# Patient Record
Sex: MALE | Race: WHITE | NOT HISPANIC OR LATINO | Employment: FULL TIME | URBAN - METROPOLITAN AREA
[De-identification: names, ages, dates, MRNs, and addresses within clinical notes are randomized per-mention and may not be internally consistent; named-entity substitution may affect disease eponyms.]

---

## 2017-02-22 DIAGNOSIS — E11.9 TYPE 2 DIABETES MELLITUS WITHOUT COMPLICATION, WITHOUT LONG-TERM CURRENT USE OF INSULIN (H): ICD-10-CM

## 2017-02-22 NOTE — TELEPHONE ENCOUNTER
Metformin         Last Written Prescription Date: 10/26/16  Last Fill Quantity: 60, # refills: 0  Last Office Visit with [facility], [facility] or [facility] prescribing provider:  11/08/16        BP Readings from Last 3 Encounters:   11/08/16 104/80   08/25/15 120/78   06/11/14 120/72     Lab Results   Component Value Date    MICROL 15 11/08/2016     No results found for: MICROALBUMIN  Creatinine   Date Value Ref Range Status   11/08/2016 0.81 0.66 - 1.25 mg/dL Final   ]  GFR Estimate   Date Value Ref Range Status   11/08/2016 >90  Non  GFR Calc   >60 mL/min/1.7m2 Final   08/25/2015 >90  Non  GFR Calc   >60 mL/min/1.7m2 Final   01/22/2014 >90 >60 mL/min/1.7m2 Final     GFR Estimate If Black   Date Value Ref Range Status   11/08/2016 >90   GFR Calc   >60 mL/min/1.7m2 Final   08/25/2015 >90   GFR Calc   >60 mL/min/1.7m2 Final   01/22/2014 >90 >60 mL/min/1.7m2 Final     Lab Results   Component Value Date    CHOL 143 11/08/2016     Lab Results   Component Value Date    HDL 43 11/08/2016     Lab Results   Component Value Date    LDL 86 11/08/2016     Lab Results   Component Value Date    TRIG 69 11/08/2016     Lab Results   Component Value Date    CHOLHDLRATIO 3.2 08/25/2015     Lab Results   Component Value Date    AST 15 11/08/2016     Lab Results   Component Value Date    ALT 29 11/08/2016     Lab Results   Component Value Date    A1C 6.9 11/08/2016    A1C 6.0 08/25/2015    A1C 7.6 01/22/2014    A1C 6.7 09/23/2013    A1C 6.9 11/16/2012     Potassium   Date Value Ref Range Status   11/08/2016 3.9 3.4 - 5.3 mmol/L Final       Labs showing if normal/abnormal  Lab Results   Component Value Date    UCRR 163 11/08/2016    MICROL 15 11/08/2016     Lab Results   Component Value Date    CHOL 143 11/08/2016    TRIG 69 11/08/2016    HDL 43 11/08/2016    LDL 86 11/08/2016    VLDL 19 08/25/2015    CHOLHDLRATIO 3.2 08/25/2015     Lab Results   Component Value Date    A1C 6.9 (H)  11/08/2016    A1C 6.0 08/25/2015    A1C 7.6 (H) 01/22/2014

## 2017-02-24 ENCOUNTER — TELEPHONE (OUTPATIENT)
Dept: INTERNAL MEDICINE | Facility: CLINIC | Age: 47
End: 2017-02-24

## 2017-02-24 DIAGNOSIS — E11.9 TYPE 2 DIABETES MELLITUS WITHOUT COMPLICATION, WITHOUT LONG-TERM CURRENT USE OF INSULIN (H): ICD-10-CM

## 2017-02-24 RX ORDER — GLIMEPIRIDE 1 MG/1
1 TABLET ORAL DAILY
Qty: 90 TABLET | Refills: 0 | Status: SHIPPED | OUTPATIENT
Start: 2017-02-24 | End: 2017-06-26

## 2017-02-24 RX ORDER — METFORMIN HCL 500 MG
500 TABLET, EXTENDED RELEASE 24 HR ORAL 2 TIMES DAILY
Qty: 60 TABLET | Refills: 2 | Status: SHIPPED | OUTPATIENT
Start: 2017-02-24 | End: 2017-06-26

## 2017-02-24 NOTE — TELEPHONE ENCOUNTER
[facility] sent refill request for Glimepiride 1mg, 1 tab daily. This was discontinued from med list on 11/8/16, stating stopped by pt.     Attempted to contact pt to find out if he is still taking this. Left voice message to call back.

## 2017-02-24 NOTE — TELEPHONE ENCOUNTER
Pt calls back. He states he has been taking this. He thinks this was discontinued in error.     Lab Results   Component Value Date    A1C 6.9 11/08/2016    A1C 6.0 08/25/2015    A1C 7.6 01/22/2014    A1C 6.7 09/23/2013    A1C 6.9 11/16/2012     Creatinine   Date Value Ref Range Status   11/08/2016 0.81 0.66 - 1.25 mg/dL Final   ]

## 2017-04-26 ENCOUNTER — OFFICE VISIT (OUTPATIENT)
Dept: CARDIOLOGY | Facility: CLINIC | Age: 47
End: 2017-04-26
Attending: INTERNAL MEDICINE
Payer: COMMERCIAL

## 2017-04-26 VITALS
WEIGHT: 283.7 LBS | SYSTOLIC BLOOD PRESSURE: 136 MMHG | DIASTOLIC BLOOD PRESSURE: 88 MMHG | BODY MASS INDEX: 44.53 KG/M2 | HEIGHT: 67 IN

## 2017-04-26 DIAGNOSIS — R06.02 SOB (SHORTNESS OF BREATH): ICD-10-CM

## 2017-04-26 DIAGNOSIS — R06.02 SOB (SHORTNESS OF BREATH): Primary | ICD-10-CM

## 2017-04-26 DIAGNOSIS — R07.89 CHEST PRESSURE: ICD-10-CM

## 2017-04-26 DIAGNOSIS — I10 ESSENTIAL HYPERTENSION, BENIGN: ICD-10-CM

## 2017-04-26 DIAGNOSIS — E78.5 HYPERLIPIDEMIA LDL GOAL <100: ICD-10-CM

## 2017-04-26 DIAGNOSIS — E78.2 MIXED HYPERLIPIDEMIA: ICD-10-CM

## 2017-04-26 LAB — NT-PROBNP SERPL-MCNC: 16 PG/ML (ref 0–125)

## 2017-04-26 PROCEDURE — 83880 ASSAY OF NATRIURETIC PEPTIDE: CPT | Performed by: INTERNAL MEDICINE

## 2017-04-26 PROCEDURE — 99204 OFFICE O/P NEW MOD 45 MIN: CPT | Mod: 25 | Performed by: INTERNAL MEDICINE

## 2017-04-26 PROCEDURE — 36415 COLL VENOUS BLD VENIPUNCTURE: CPT | Performed by: INTERNAL MEDICINE

## 2017-04-26 PROCEDURE — 93000 ELECTROCARDIOGRAM COMPLETE: CPT | Performed by: INTERNAL MEDICINE

## 2017-04-26 RX ORDER — IBUPROFEN 200 MG
600 TABLET ORAL 2 TIMES DAILY
Status: ON HOLD | COMMUNITY
End: 2018-07-18

## 2017-04-26 NOTE — LETTER
"4/26/2017    Edna Younger MD  [facility]  [address] [place] [address]    RE: Montez Valerio       Dear Colleague,    I had the pleasure of seeing Montez Valerio in the [facility].    I had the pleasure of seeing Montez Valerio in cardiology consultation on your request for shortness of breath and occasional chest tightness.  Montez is a pleasant 47-year-old male with history of diabetes and hypertension.  His last A1c was 6.9 in 11/2016.  Last November, he was at work, and typically at work, he takes a break and does some lifting weights at the same site.  After one of those activities, he noticed some shortness of breath.  He thought it would be transient, but since then the shortness of breath has persisted.  He can walk on a regular pace but is now aware of some shortness of breath.  It is also noticeable when he walks a flight of stairs.  Along with that, he started developing a cough with minimal expectoration.  It is usually in the morning.  It has been bothersome to a point that he called your office and was given a prednisone Dosepak that did not help his cough.  Along with the shortness of breath and cough, he has some intermittent chest pressure that is not always related to exertion.  On days when his chest is more \"irritable\" he feels more short of breath and has cough.      Because of these symptoms, he initially underwent a stress echocardiogram in 11/2016, which showed no clear evidence of ischemia.  But because there was slight chest discomfort with exercise, he was referred for a stress nuclear study.  This was done on 12/02/2016 with exercise.  Exercise capacity was average to above average with 10.9 METs, and the perfusion was normal with ejection fraction greater than 70%.  Again, he had some chest tightness with exercise.        He claims that the tightness is not always related to exertion when he has it now.      His blood pressure " is reasonably well controlled.  He takes 6-9 tablets of ibuprofen every day for his neck pain.  He has cervical spinal stenosis since a young age, and it is quite bothersome.  He is on simvastatin, but it takes it about 3-4 days a week.  His last LDL was 86.      PHYSICAL EXAMINATION:   VITAL SIGNS:  Blood pressure 136/88, pulse 80 per minute and regular.   CARDIAC:  Regular S1, S2 with no murmurs.   CHEST:  Clear to auscultation.     Outpatient Encounter Prescriptions as of 4/26/2017   Medication Sig Dispense Refill     ibuprofen (ADVIL/MOTRIN) 200 MG tablet Take 600 mg by mouth 2 times daily       cholecalciferol (VITAMIN  -D) 1000 UNITS capsule Take 1 capsule by mouth daily       [DISCONTINUED] metFORMIN (GLUCOPHAGE-XR) 500 MG 24 hr tablet Take 1 tablet (500 mg) by mouth 2 times daily 60 tablet 2     [DISCONTINUED] glimepiride (AMARYL) 1 MG tablet Take 1 tablet (1 mg) by mouth daily 90 tablet 0     blood glucose monitoring (ONETOUCH VERIO) meter device kit Use to test blood sugars ONE times daily or as directed. 1 kit 0     blood glucose monitoring (ONE TOUCH DELICA) lancets Use to test blood sugars ONE times daily or as directed. 1 Box 3     blood glucose monitoring (ONE TOUCH VERIO IQ) test strip Use to test blood sugars ONE times daily or as directed. 100 each 3     blood glucose monitoring (ACCU-CHEK HOWARD PLUS) test strip by In Vitro route daily 100 each 1     simvastatin (ZOCOR) 20 MG tablet Take 1 tablet (20 mg) by mouth daily at bedtime. (Patient taking differently: Take 20 mg by mouth Take one tablet 2-3 days week) 90 tablet 3     [DISCONTINUED] losartan-hydrochlorothiazide (HYZAAR) 50-12.5 MG per tablet Take 1 tablet by mouth daily 90 tablet 1     [DISCONTINUED] methylPREDNISolone (MEDROL DOSEPAK) 4 MG tablet Follow package instructions 21 tablet 0     [DISCONTINUED] ORDER FOR DME Equipment being ordered: Please dispense 1 set of crutches 1 Device 0     No facility-administered encounter medications on  file as of 4/26/2017.       IMPRESSION:   1.  Episode of shortness of breath with exertion since November.   2.  Atypical chest pressure, not always related to exertion.   3.  Normal perfusion as well as normal stress echo but with exertional chest pressure on stress testing.   4.  Obstructive sleep apnea on CPAP therapy.   5.  Cough since November, questionable etiology, failed prednisone.   6.  Diabetes.   7.  Hyperlipidemia, on simvastatin.   8.  Degenerative cervical arthritis on chronic ibuprofen therapy.        PLAN:     1.  Shortness of breath and chest pressure.  At this time, I would suggest we do an echocardiogram for us to assess diastology as well as PA pressures.  Given the longstanding sleep apnea, he may be at risk for pulmonary hypertension.  I have suggested that we obtain the RV pressures by TR jet and if required inject saline to enhance the TR jet.  RV size and function will also be assessed.  If echocardiogram is entirely normal without any structural abnormalities or pulmonary hypertension, we may consider doing further evaluation for obstructive CAD.  The options would be either do a noninvasive CT coronary angiography versus invasive angiography.  I would also suggest that he see a pulmonologist to rule out pulmonary causes of shortness of breath and cough.  I have asked him to be in touch with you to get a referral.      Depending on the echocardiogram results, we will make further decision.  I will have my nurse call him and then decide on the further tests.  If he is willing, I would have a low threshold for doing a CT coronary angiography.  However, he understands the risk of dye exposure, radiation exposure as well as the technical difficulty given his habitus, but it would be noninvasive.      The patient is willing to proceed with the echocardiogram.  We will call him with the results and decide further plan.  I have also asked him to call you to get a pulmonary consult.      Thank you  for allowing us to participate in the care of this nice patient.  He was also asked to see a pain specialist and get off the chronic use of ibuprofen, as it can have long-term kidney, gastric side effects as well as raise blood pressure and cause edema.     Sincerely,    Jeancarlos Jones MD     [facility]

## 2017-04-26 NOTE — MR AVS SNAPSHOT
After Visit Summary   4/26/2017    Montez Valerio    MRN: [id number]           Patient Information     Date Of Birth          1970        Visit Information        Provider Department      4/26/2017 11:15 AM Jeancarlos Jones MD [facility] [place]        Today's Diagnoses     SOB (shortness of breath)    -  1    Essential hypertension, benign        Mixed hyperlipidemia        Hyperlipidemia LDL goal <100        Chest pressure           Follow-ups after your visit        Future tests that were ordered for you today     Open Future Orders        Priority Expected Expires Ordered    Echocardiogram Routine 5/3/2017 4/26/2018 4/26/2017    N terminal pro BNP outpatient Routine 4/26/2017 4/21/2018 4/26/2017            Who to contact     If you have questions or need follow up information about today's clinic visit or your schedule please contact [facility] directly at [phone].  Normal or non-critical lab and imaging results will be communicated to you by MyChart, letter or phone within 4 business days after the clinic has received the results. If you do not hear from us within 7 days, please contact the clinic through OpenSpanhart or phone. If you have a critical or abnormal lab result, we will notify you by phone as soon as possible.  Submit refill requests through m2fx or call your pharmacy and they will forward the refill request to us. Please allow 3 business days for your refill to be completed.          Additional Information About Your Visit        MyChart Information     m2fx gives you secure access to your electronic health record. If you see a primary care provider, you can also send messages to your care team and make appointments. If you have questions, please call your primary care clinic.  If you do not have a primary care provider, please call [phone] and they will assist you.        Care EveryWhere  "ID     This is your Care EveryWhere ID. This could be used by other organizations to access your [facility] medical records  [id number]        Your Vitals Were     Height BMI (Body Mass Index)                1.702 m (5' 7\") 44.43 kg/m2           Blood Pressure from Last 3 Encounters:   04/26/17 136/88   11/08/16 104/80   08/25/15 120/78    Weight from Last 3 Encounters:   04/26/17 128.7 kg (283 lb 11.2 oz)   11/08/16 127.7 kg (281 lb 8 oz)   08/25/15 123.4 kg (272 lb)              We Performed the Following     EKG 12-lead complete w/read - Clinics (performed today)          Today's Medication Changes          These changes are accurate as of: 4/26/17 11:48 AM.  If you have any questions, ask your nurse or doctor.               These medicines have changed or have updated prescriptions.        Dose/Directions    simvastatin 20 MG tablet   Commonly known as:  ZOCOR   This may have changed:    - when to take this  - additional instructions   Used for:  Hyperlipidemia LDL goal <100        Dose:  20 mg   Take 1 tablet (20 mg) by mouth daily at bedtime.   Quantity:  90 tablet   Refills:  3                Primary Care Provider Office Phone # Fax #    Krishnakumari BRANDON Younger [phone]       [place] [address] [place] [address]        Thank you!     Thank you for choosing [facility] [place]  for your care. Our goal is always to provide you with excellent care. Hearing back from our patients is one way we can continue to improve our services. Please take a few minutes to complete the written survey that you may receive in the mail after your visit with us. Thank you!             Your Updated Medication List - Protect others around you: Learn how to safely use, store and throw away your medicines at www.disposemymeds.org.          This list is accurate as of: 4/26/17 11:48 AM.  Always use your most recent med list.                   Brand Name " Dispense Instructions for use    blood glucose monitoring lancets     1 Box    Use to test blood sugars ONE times daily or as directed.       blood glucose monitoring meter device kit     1 kit    Use to test blood sugars ONE times daily or as directed.       * blood glucose monitoring test strip    ACCU-CHEK HOWARD PLUS    100 each    by In Vitro route daily       * blood glucose monitoring test strip    ONE TOUCH VERIO IQ    100 each    Use to test blood sugars ONE times daily or as directed.       cholecalciferol 1000 UNITS capsule    vitamin  -D     Take 1 capsule by mouth daily       glimepiride 1 MG tablet    AMARYL    90 tablet    Take 1 tablet (1 mg) by mouth daily       ibuprofen 200 MG tablet    ADVIL/MOTRIN     Take 600 mg by mouth 2 times daily       losartan-hydrochlorothiazide 50-12.5 MG per tablet    HYZAAR    90 tablet    Take 1 tablet by mouth daily       metFORMIN 500 MG 24 hr tablet    GLUCOPHAGE-XR    60 tablet    Take 1 tablet (500 mg) by mouth 2 times daily       simvastatin 20 MG tablet    ZOCOR    90 tablet    Take 1 tablet (20 mg) by mouth daily at bedtime.       * Notice:  This list has 2 medication(s) that are the same as other medications prescribed for you. Read the directions carefully, and ask your doctor or other care provider to review them with you.

## 2017-04-26 NOTE — PROGRESS NOTES
HPI and Plan:   See dictation  [id number]    Orders Placed This Encounter   Procedures     N terminal pro BNP outpatient     EKG 12-lead complete w/read - Clinics (performed today)     Echocardiogram       Orders Placed This Encounter   Medications     ibuprofen (ADVIL/MOTRIN) 200 MG tablet     Sig: Take 600 mg by mouth 2 times daily     cholecalciferol (VITAMIN  -D) 1000 UNITS capsule     Sig: Take 1 capsule by mouth daily       Medications Discontinued During This Encounter   Medication Reason     ORDER FOR DME Therapy completed     methylPREDNISolone (MEDROL DOSEPAK) 4 MG tablet Therapy completed         Encounter Diagnoses   Name Primary?     Essential hypertension, benign      Mixed hyperlipidemia      Hyperlipidemia LDL goal <100      SOB (shortness of breath) Yes     Chest pressure        CURRENT MEDICATIONS:  Current Outpatient Prescriptions   Medication Sig Dispense Refill     ibuprofen (ADVIL/MOTRIN) 200 MG tablet Take 600 mg by mouth 2 times daily       cholecalciferol (VITAMIN  -D) 1000 UNITS capsule Take 1 capsule by mouth daily       metFORMIN (GLUCOPHAGE-XR) 500 MG 24 hr tablet Take 1 tablet (500 mg) by mouth 2 times daily 60 tablet 2     glimepiride (AMARYL) 1 MG tablet Take 1 tablet (1 mg) by mouth daily 90 tablet 0     blood glucose monitoring (ONETOUCH VERIO) meter device kit Use to test blood sugars ONE times daily or as directed. 1 kit 0     blood glucose monitoring (ONE TOUCH DELICA) lancets Use to test blood sugars ONE times daily or as directed. 1 Box 3     blood glucose monitoring (ONE TOUCH VERIO IQ) test strip Use to test blood sugars ONE times daily or as directed. 100 each 3     blood glucose monitoring (ACCU-CHEK HOWARD PLUS) test strip by In Vitro route daily 100 each 1     losartan-hydrochlorothiazide (HYZAAR) 50-12.5 MG per tablet Take 1 tablet by mouth daily 90 tablet 1     simvastatin (ZOCOR) 20 MG tablet Take 1 tablet (20 mg) by mouth daily at bedtime. (Patient taking differently: Take  20 mg by mouth Take one tablet 2-3 days week) 90 tablet 3       ALLERGIES     Allergies   Allergen Reactions     No Known Drug Allergies        PAST MEDICAL HISTORY:  Past Medical History:   Diagnosis Date     Arthritis     Neck     Diabetes mellitus (H)     type 2     Dyspnea on exertion      Gastro-oesophageal reflux disease      Hypertension      Other chronic pain     neck pain     PONV (postoperative nausea and vomiting)      Sleep apnea     CPAP     SOB (shortness of breath) 4/26/2017       PAST SURGICAL HISTORY:  Past Surgical History:   Procedure Laterality Date     ARTHROSCOPY KNEE  2/12/2014    Procedure: ARTHROSCOPY KNEE;  Right Knee Arthroscopy, Removal of Loose Body, Partial Lateral Meniscectomy  ;  Surgeon: Amado Rondon MD;  Location: [facility] OR     AXILLARY SURGERY      Mass removed left     HC KNEE SCOPE, DIAGNOSTIC  1984    Arthroscopy, Knee right     LAPAROSCOPIC CHOLECYSTECTOMY  12/21/2012    Procedure: LAPAROSCOPIC CHOLECYSTECTOMY;  LAPAROSCOPIC CHOLECYSTECTOMY;  Surgeon: Alton Riggs MD;  Location: [facility] OR     wisdom teeth[         FAMILY HISTORY:  Family History   Problem Relation Age of Onset     Lipids Mother      CEREBROVASCULAR DISEASE Mother      Lipids Father      C.A.D. Maternal Grandfather      MI     C.A.D. Paternal Grandfather      MI     CANCER Paternal Grandmother      Pancreatic     Alcohol/Drug Sister      Psychotic Disorder Sister        SOCIAL HISTORY:  Social History     Social History     Marital status: Single     Spouse name: N/A     Number of children: N/A     Years of education: N/A     Social History Main Topics     Smoking status: Current Some Day Smoker     Types: Cigars     Smokeless tobacco: Never Used      Comment: occ. cigar     Alcohol use Yes      Comment: 4-6 drinks, 1-2 nights week, beer/liquor     Drug use: No     Sexual activity: No     Other Topics Concern     None     Social History Narrative       Review of Systems:  Skin:  Negative       Eyes:   "Positive for glasses    ENT:  Negative      Respiratory:  Positive for cough;dyspnea on exertion     Cardiovascular:  cyanosis;Negative;edema;syncope or near-syncope Positive for;palpitations;lightheadedness;dizziness;fatigue;lower extremity symptoms chest pressure  Gastroenterology: Negative      Genitourinary:  Negative      Musculoskeletal:  Positive for joint pain;neck pain    Neurologic:  Positive for numbness or tingling of feet    Psychiatric:  Negative      Heme/Lymph/Imm:  Negative      Endocrine:  Positive for diabetes      Physical Exam:  Vitals: /88 (BP Location: Left arm, Cuff Size: Adult Large)  Ht 1.702 m (5' 7\")  Wt 128.7 kg (283 lb 11.2 oz)  BMI 44.43 kg/m2    Constitutional:  cooperative overweight      Skin:  warm and dry to the touch        Head:  no masses or lesions        Eyes:  pupils equal and round        ENT:  dentition good        Neck:  carotid pulses are full and equal bilaterally        Chest:  clear to auscultation          Cardiac: regular rhythm;normal S1 and S2   S4 no presence of murmur            Abdomen:    obese      Vascular: not assessed this visit                                        Extremities and Back:      trace;bilateral LE edema          Neurological:                 CC  Edna Younger MD  [facility]  [address] [place] [address]              "

## 2017-04-27 NOTE — PROGRESS NOTES
"HISTORY OF PRESENT ILLNESS:  I had the pleasure of seeing Montez Valerio in cardiology consultation on your request for shortness of breath and occasional chest tightness.  Montez is a pleasant 47-year-old male with history of diabetes and hypertension.  His last A1c was 6.9 in 11/2016.  Last November, he was at work, and typically at work, he takes a break and does some lifting weights at the same site.  After one of those activities, he noticed some shortness of breath.  He thought it would be transient, but since then the shortness of breath has persisted.  He can walk on a regular pace but is now aware of some shortness of breath.  It is also noticeable when he walks a flight of stairs.  Along with that, he started developing a cough with minimal expectoration.  It is usually in the morning.  It has been bothersome to a point that he called your office and was given a prednisone Dosepak that did not help his cough.  Along with the shortness of breath and cough, he has some intermittent chest pressure that is not always related to exertion.  On days when his chest is more \"irritable\" he feels more short of breath and has cough.      Because of these symptoms, he initially underwent a stress echocardiogram in 11/2016, which showed no clear evidence of ischemia.  But because there was slight chest discomfort with exercise, he was referred for a stress nuclear study.  This was done on 12/02/2016 with exercise.  Exercise capacity was average to above average with 10.9 METs, and the perfusion was normal with ejection fraction greater than 70%.  Again, he had some chest tightness with exercise.        He claims that the tightness is not always related to exertion when he has it now.      His blood pressure is reasonably well controlled.  He takes 6-9 tablets of ibuprofen every day for his neck pain.  He has cervical spinal stenosis since a young age, and it is quite bothersome.  He is on simvastatin, but it takes it about " 3-4 days a week.  His last LDL was 86.      PHYSICAL EXAMINATION:   VITAL SIGNS:  Blood pressure 136/88, pulse 80 per minute and regular.   CARDIAC:  Regular S1, S2 with no murmurs.   CHEST:  Clear to auscultation.      IMPRESSION:   1.  Episode of shortness of breath with exertion since November.   2.  Atypical chest pressure, not always related to exertion.   3.  Normal perfusion as well as normal stress echo but with exertional chest pressure on stress testing.   4.  Obstructive sleep apnea on CPAP therapy.   5.  Cough since November, questionable etiology, failed prednisone.   6.  Diabetes.   7.  Hyperlipidemia, on simvastatin.   8.  Degenerative cervical arthritis on chronic ibuprofen therapy.        PLAN:     1.  Shortness of breath and chest pressure.  At this time, I would suggest we do an echocardiogram for us to assess diastology as well as PA pressures.  Given the longstanding sleep apnea, he may be at risk for pulmonary hypertension.  I have suggested that we obtain the RV pressures by TR jet and if required inject saline to enhance the TR jet.  RV size and function will also be assessed.  If echocardiogram is entirely normal without any structural abnormalities or pulmonary hypertension, we may consider doing further evaluation for obstructive CAD.  The options would be either do a noninvasive CT coronary angiography versus invasive angiography.  I would also suggest that he see a pulmonologist to rule out pulmonary causes of shortness of breath and cough.  I have asked him to be in touch with you to get a referral.      Depending on the echocardiogram results, we will make further decision.  I will have my nurse call him and then decide on the further tests.  If he is willing, I would have a low threshold for doing a CT coronary angiography.  However, he understands the risk of dye exposure, radiation exposure as well as the technical difficulty given his habitus, but it would be noninvasive.      The  patient is willing to proceed with the echocardiogram.  We will call him with the results and decide further plan.  I have also asked him to call you to get a pulmonary consult.      Thank you for allowing us to participate in the care of this nice patient.  He was also asked to see a pain specialist and get off the chronic use of ibuprofen, as it can have long-term kidney, gastric side effects as well as raise blood pressure and cause edema.      cc:   Eliseo Younger MD    [facility]    [address]    [place]  [address]         AFSANEH CALHOUN MD             D: 2017 11:56   T: 2017 04:28   MT: ROSE      Name:     KATIE GUADALUPE   MRN:      [id number]        Account:      [id number]   :      1970           Service Date: 2017      Document: [id number]

## 2017-05-01 ENCOUNTER — HOSPITAL ENCOUNTER (OUTPATIENT)
Dept: CARDIOLOGY | Facility: CLINIC | Age: 47
Discharge: HOME | End: 2017-05-01
Attending: INTERNAL MEDICINE | Admitting: INTERNAL MEDICINE
Payer: COMMERCIAL

## 2017-05-01 DIAGNOSIS — R07.89 CHEST PRESSURE: ICD-10-CM

## 2017-05-01 DIAGNOSIS — R06.02 SOB (SHORTNESS OF BREATH): ICD-10-CM

## 2017-05-01 PROCEDURE — 25500064 ZZH RX 255 OP 636: Performed by: INTERNAL MEDICINE

## 2017-05-01 PROCEDURE — 40000264 ECHO COMPLETE WITH LUMASON

## 2017-05-01 PROCEDURE — 93306 TTE W/DOPPLER COMPLETE: CPT | Mod: 26 | Performed by: INTERNAL MEDICINE

## 2017-05-01 RX ADMIN — SULFUR HEXAFLUORIDE 5 ML: KIT at 09:00

## 2017-05-09 ENCOUNTER — TELEPHONE (OUTPATIENT)
Dept: CARDIOLOGY | Facility: CLINIC | Age: 47
End: 2017-05-09

## 2017-05-09 ENCOUNTER — TELEPHONE (OUTPATIENT)
Dept: INTERNAL MEDICINE | Facility: CLINIC | Age: 47
End: 2017-05-09

## 2017-05-09 DIAGNOSIS — R06.02 SOB (SHORTNESS OF BREATH): Primary | ICD-10-CM

## 2017-05-09 NOTE — TELEPHONE ENCOUNTER
Please review -   Left Ventricle  The left ventricle is normal in size. There is mild concentric left  ventricular hypertrophy. Left ventricular systolic function is normal. The  visual ejection fraction is estimated at 60-65%. E by E prime ratio is between  8 and 15, which is indeterminate for assessment of left ventricular filling  pressures. No regional wall motion abnormalities noted...  Please review entire echo results

## 2017-05-09 NOTE — TELEPHONE ENCOUNTER
Panel Management Review      Patient has the following on his problem list:     Diabetes    ASA: Failed    Last A1C  Lab Results   Component Value Date    A1C 6.9 11/08/2016    A1C 6.0 08/25/2015    A1C 7.6 01/22/2014    A1C 6.7 09/23/2013    A1C 6.9 11/16/2012     A1C tested: Passed    Last LDL:    Lab Results   Component Value Date    CHOL 143 11/08/2016     Lab Results   Component Value Date    HDL 43 11/08/2016     Lab Results   Component Value Date    LDL 86 11/08/2016     Lab Results   Component Value Date    TRIG 69 11/08/2016     Lab Results   Component Value Date    CHOLHDLRATIO 3.2 08/25/2015     Lab Results   Component Value Date    NHDL 100 11/08/2016       Is the patient on a Statin? YES             Is the patient on Aspirin? NO    Medications     HMG CoA Reductase Inhibitors    simvastatin (ZOCOR) 20 MG tablet          Last three blood pressure readings:  BP Readings from Last 3 Encounters:   04/26/17 136/88   11/08/16 104/80   08/25/15 120/78       Date of last diabetes office visit: 11/2016     Tobacco History:     History   Smoking Status     Current Some Day Smoker     Types: Cigars   Smokeless Tobacco     Never Used     Comment: occ. cigar           IVD   ASA: MONITOR    Last LDL:    Lab Results   Component Value Date    CHOL 143 11/08/2016     Lab Results   Component Value Date    HDL 43 11/08/2016     Lab Results   Component Value Date    LDL 86 11/08/2016     Lab Results   Component Value Date    TRIG 69 11/08/2016        Lab Results   Component Value Date    CHOLHDLRATIO 3.2 08/25/2015        Is the patient on a Statin? YES   Is the patient on Aspirin? NO                  Medications     HMG CoA Reductase Inhibitors    simvastatin (ZOCOR) 20 MG tablet          Last three blood pressure readings:  BP Readings from Last 3 Encounters:   04/26/17 136/88   11/08/16 104/80   08/25/15 120/78        Tobacco History:     History   Smoking Status     Current Some Day Smoker     Types: Cigars    Smokeless Tobacco     Never Used     Comment: occ. cigar       Hypertension   Last three blood pressure readings:  BP Readings from Last 3 Encounters:   04/26/17 136/88   11/08/16 104/80   08/25/15 120/78     Blood pressure: Passed    HTN Guidelines:  Age 18-59 BP range:  Less than 140/90  Age 60-85 with Diabetes:  Less than 140/90  Age 60-85 without Diabetes:  less than 150/90      Composite cancer screening  Chart review shows that this patient is due/due soon for the following None  Summary:    Patient is due/failing the following:   A1C and FOLLOW UP    Action needed:   Patient needs office visit for DM/HTN/Lipid.    Type of outreach:    Sent Lily [employer] message.    Questions for provider review:    None                                                                                                                                    Jazmine Oquendo CMA       Chart routed to no one .

## 2017-05-11 DIAGNOSIS — I42.9 CARDIOMYOPATHY, UNSPECIFIED (H): Primary | ICD-10-CM

## 2017-05-11 DIAGNOSIS — R06.02 SOB (SHORTNESS OF BREATH): ICD-10-CM

## 2017-05-11 NOTE — TELEPHONE ENCOUNTER
Called Pt informed him DR Jones would like him to come in to review echo results. Pt agreed and transferred him to scheduling. NIRU Quevedo RN

## 2017-05-17 NOTE — TELEPHONE ENCOUNTER
Called Pt per DR Jones and informed him of echo results: Left ventricular systolic function is normal. The visual ejection fraction is estimated at 60-65%.  Right ventricular systolic pressure could not be approximated due to inadequate tricuspid regurgitation. The right ventricular systolic function is normal. The study was technically difficult. There is no comparison study available. Per DR Jones echo ok. Discussed with Pt if he still has SOB. Per Pt SOB and cough are slowly improving. Asked Pt if he wanted to do further testing and DR Jones recommended CTa. Pt said at present he would wait and see PMD regarding lingering SOB with cough and discuss Pulmonary F/U. T Sae GRUBBS  _

## 2017-06-26 ENCOUNTER — TELEPHONE (OUTPATIENT)
Dept: INTERNAL MEDICINE | Facility: CLINIC | Age: 47
End: 2017-06-26

## 2017-06-26 DIAGNOSIS — I10 ESSENTIAL HYPERTENSION WITH GOAL BLOOD PRESSURE LESS THAN 140/90: ICD-10-CM

## 2017-06-26 DIAGNOSIS — E11.9 TYPE 2 DIABETES MELLITUS WITHOUT COMPLICATION, WITHOUT LONG-TERM CURRENT USE OF INSULIN (H): ICD-10-CM

## 2017-06-26 RX ORDER — METFORMIN HCL 500 MG
TABLET, EXTENDED RELEASE 24 HR ORAL
Qty: 30 TABLET | Refills: 0 | Status: SHIPPED | OUTPATIENT
Start: 2017-06-26 | End: 2017-08-25

## 2017-06-26 RX ORDER — LOSARTAN POTASSIUM AND HYDROCHLOROTHIAZIDE 12.5; 5 MG/1; MG/1
TABLET ORAL
Qty: 15 TABLET | Refills: 0 | Status: SHIPPED | OUTPATIENT
Start: 2017-06-26 | End: 2017-08-25

## 2017-06-26 RX ORDER — GLIMEPIRIDE 1 MG/1
TABLET ORAL
Qty: 15 TABLET | Refills: 0 | Status: SHIPPED | OUTPATIENT
Start: 2017-06-26 | End: 2017-08-25

## 2017-06-26 NOTE — TELEPHONE ENCOUNTER
Metformin         Last Written Prescription Date: 2/24/17  Last Fill Quantity: 60, # refills: 2  Last Office Visit with [facility] or [facility] prescribing provider:  11/8/16        BP Readings from Last 3 Encounters:   04/26/17 136/88   11/08/16 104/80   08/25/15 120/78     Lab Results   Component Value Date    MICROL 15 11/08/2016     Lab Results   Component Value Date    UMALCR 9.08 11/08/2016     Creatinine   Date Value Ref Range Status   11/08/2016 0.81 0.66 - 1.25 mg/dL Final   ]  GFR Estimate   Date Value Ref Range Status   11/08/2016 >90  Non  GFR Calc   >60 mL/min/1.7m2 Final   08/25/2015 >90  Non  GFR Calc   >60 mL/min/1.7m2 Final   01/22/2014 >90 >60 mL/min/1.7m2 Final     GFR Estimate If Black   Date Value Ref Range Status   11/08/2016 >90   GFR Calc   >60 mL/min/1.7m2 Final   08/25/2015 >90   GFR Calc   >60 mL/min/1.7m2 Final   01/22/2014 >90 >60 mL/min/1.7m2 Final     Lab Results   Component Value Date    CHOL 143 11/08/2016     Lab Results   Component Value Date    HDL 43 11/08/2016     Lab Results   Component Value Date    LDL 86 11/08/2016     Lab Results   Component Value Date    TRIG 69 11/08/2016     Lab Results   Component Value Date    CHOLHDLRATIO 3.2 08/25/2015     Lab Results   Component Value Date    AST 15 11/08/2016     Lab Results   Component Value Date    ALT 29 11/08/2016     Lab Results   Component Value Date    A1C 6.9 11/08/2016    A1C 6.0 08/25/2015    A1C 7.6 01/22/2014    A1C 6.7 09/23/2013    A1C 6.9 11/16/2012     Potassium   Date Value Ref Range Status   11/08/2016 3.9 3.4 - 5.3 mmol/L Final       Labs showing if normal/abnormal  Lab Results   Component Value Date    UCRR 163 11/08/2016    MICROL 15 11/08/2016     Lab Results   Component Value Date    CHOL 143 11/08/2016    TRIG 69 11/08/2016    HDL 43 11/08/2016    LDL 86 11/08/2016    VLDL 19 08/25/2015    CHOLHDLRATIO 3.2 08/25/2015     Lab Results   Component  Value Date    A1C 6.9 (H) 11/08/2016    A1C 6.0 08/25/2015    A1C 7.6 (H) 01/22/2014       Glimepiride         Last Written Prescription Date: 2/24/17  Last Fill Quantity: 90, # refills: 0  Last Office Visit with [facility] or [facility] prescribing provider:  11/8/16        BP Readings from Last 3 Encounters:   04/26/17 136/88   11/08/16 104/80   08/25/15 120/78     Lab Results   Component Value Date    MICROL 15 11/08/2016     Lab Results   Component Value Date    UMALCR 9.08 11/08/2016     Creatinine   Date Value Ref Range Status   11/08/2016 0.81 0.66 - 1.25 mg/dL Final   ]  GFR Estimate   Date Value Ref Range Status   11/08/2016 >90  Non  GFR Calc   >60 mL/min/1.7m2 Final   08/25/2015 >90  Non  GFR Calc   >60 mL/min/1.7m2 Final   01/22/2014 >90 >60 mL/min/1.7m2 Final     GFR Estimate If Black   Date Value Ref Range Status   11/08/2016 >90   GFR Calc   >60 mL/min/1.7m2 Final   08/25/2015 >90   GFR Calc   >60 mL/min/1.7m2 Final   01/22/2014 >90 >60 mL/min/1.7m2 Final     Lab Results   Component Value Date    CHOL 143 11/08/2016     Lab Results   Component Value Date    HDL 43 11/08/2016     Lab Results   Component Value Date    LDL 86 11/08/2016     Lab Results   Component Value Date    TRIG 69 11/08/2016     Lab Results   Component Value Date    CHOLHDLRATIO 3.2 08/25/2015     Lab Results   Component Value Date    AST 15 11/08/2016     Lab Results   Component Value Date    ALT 29 11/08/2016     Lab Results   Component Value Date    A1C 6.9 11/08/2016    A1C 6.0 08/25/2015    A1C 7.6 01/22/2014    A1C 6.7 09/23/2013    A1C 6.9 11/16/2012     Potassium   Date Value Ref Range Status   11/08/2016 3.9 3.4 - 5.3 mmol/L Final       Labs showing if normal/abnormal  Lab Results   Component Value Date    UCRR 163 11/08/2016    MICROL 15 11/08/2016     Lab Results   Component Value Date    CHOL 143 11/08/2016    TRIG 69 11/08/2016    HDL 43 11/08/2016    LDL 86  11/08/2016    VLDL 19 08/25/2015    CHOLHDLRATIO 3.2 08/25/2015     Lab Results   Component Value Date    A1C 6.9 (H) 11/08/2016    A1C 6.0 08/25/2015    A1C 7.6 (H) 01/22/2014     Losartan      Last Written Prescription Date: 11/8/16  Last Fill Quantity: 90, # refills: 1  Last Office Visit with [facility], [facility] or [facility] prescribing provider: 11/8/16       Potassium   Date Value Ref Range Status   11/08/2016 3.9 3.4 - 5.3 mmol/L Final     Creatinine   Date Value Ref Range Status   11/08/2016 0.81 0.66 - 1.25 mg/dL Final     BP Readings from Last 3 Encounters:   04/26/17 136/88   11/08/16 104/80   08/25/15 120/78

## 2017-06-27 ENCOUNTER — DOCUMENTATION ONLY (OUTPATIENT)
Dept: LAB | Facility: CLINIC | Age: 47
End: 2017-06-27

## 2017-06-27 ENCOUNTER — MYC MEDICAL ADVICE (OUTPATIENT)
Dept: INTERNAL MEDICINE | Facility: CLINIC | Age: 47
End: 2017-06-27

## 2017-06-27 NOTE — PROGRESS NOTES
Please place or confirm orders for upcoming lab appointment on 6/28/2017 Thank You.     Satisfactory

## 2017-06-27 NOTE — TELEPHONE ENCOUNTER
Pt calls back, relay due for labs and appt. Scheduled lab appt for 06/28 and MD for 07/10.    Pended A1C. Please add to labs as needed. Did ask pt to fast in the event fasting labs needed.    Please advise, thanks.

## 2017-06-28 DIAGNOSIS — E11.9 TYPE 2 DIABETES MELLITUS WITHOUT COMPLICATION, WITHOUT LONG-TERM CURRENT USE OF INSULIN (H): ICD-10-CM

## 2017-06-28 LAB — HBA1C MFR BLD: 7.2 % (ref 4.3–6)

## 2017-06-28 PROCEDURE — 80061 LIPID PANEL: CPT | Performed by: INTERNAL MEDICINE

## 2017-06-28 PROCEDURE — 80053 COMPREHEN METABOLIC PANEL: CPT | Performed by: INTERNAL MEDICINE

## 2017-06-28 PROCEDURE — 83036 HEMOGLOBIN GLYCOSYLATED A1C: CPT | Performed by: INTERNAL MEDICINE

## 2017-06-28 PROCEDURE — 36415 COLL VENOUS BLD VENIPUNCTURE: CPT | Performed by: INTERNAL MEDICINE

## 2017-06-28 PROCEDURE — 82043 UR ALBUMIN QUANTITATIVE: CPT | Performed by: INTERNAL MEDICINE

## 2017-06-29 LAB
ALBUMIN SERPL-MCNC: 4 G/DL (ref 3.4–5)
ALP SERPL-CCNC: 71 U/L (ref 40–150)
ALT SERPL W P-5'-P-CCNC: 35 U/L (ref 0–70)
ANION GAP SERPL CALCULATED.3IONS-SCNC: 6 MMOL/L (ref 3–14)
AST SERPL W P-5'-P-CCNC: 18 U/L (ref 0–45)
BILIRUB SERPL-MCNC: 0.6 MG/DL (ref 0.2–1.3)
BUN SERPL-MCNC: 11 MG/DL (ref 7–30)
CALCIUM SERPL-MCNC: 9.1 MG/DL (ref 8.5–10.1)
CHLORIDE SERPL-SCNC: 107 MMOL/L (ref 94–109)
CHOLEST SERPL-MCNC: 188 MG/DL
CO2 SERPL-SCNC: 26 MMOL/L (ref 20–32)
CREAT SERPL-MCNC: 0.76 MG/DL (ref 0.66–1.25)
CREAT UR-MCNC: 48 MG/DL
GFR SERPL CREATININE-BSD FRML MDRD: ABNORMAL ML/MIN/1.7M2
GLUCOSE SERPL-MCNC: 143 MG/DL (ref 70–99)
HDLC SERPL-MCNC: 50 MG/DL
LDLC SERPL CALC-MCNC: 118 MG/DL
MICROALBUMIN UR-MCNC: <5 MG/L
MICROALBUMIN/CREAT UR: NORMAL MG/G CR (ref 0–17)
NONHDLC SERPL-MCNC: 138 MG/DL
POTASSIUM SERPL-SCNC: 4.7 MMOL/L (ref 3.4–5.3)
PROT SERPL-MCNC: 7.3 G/DL (ref 6.8–8.8)
SODIUM SERPL-SCNC: 139 MMOL/L (ref 133–144)
TRIGL SERPL-MCNC: 102 MG/DL

## 2017-08-25 ENCOUNTER — OFFICE VISIT (OUTPATIENT)
Dept: INTERNAL MEDICINE | Facility: CLINIC | Age: 47
End: 2017-08-25
Payer: COMMERCIAL

## 2017-08-25 VITALS
SYSTOLIC BLOOD PRESSURE: 110 MMHG | DIASTOLIC BLOOD PRESSURE: 76 MMHG | WEIGHT: 286.5 LBS | HEART RATE: 86 BPM | BODY MASS INDEX: 44.97 KG/M2 | TEMPERATURE: 98.2 F | OXYGEN SATURATION: 95 % | HEIGHT: 67 IN

## 2017-08-25 DIAGNOSIS — Z23 NEED FOR TETANUS BOOSTER: Primary | ICD-10-CM

## 2017-08-25 DIAGNOSIS — I10 ESSENTIAL HYPERTENSION WITH GOAL BLOOD PRESSURE LESS THAN 140/90: ICD-10-CM

## 2017-08-25 DIAGNOSIS — E11.9 TYPE 2 DIABETES MELLITUS WITHOUT COMPLICATION, WITHOUT LONG-TERM CURRENT USE OF INSULIN (H): ICD-10-CM

## 2017-08-25 DIAGNOSIS — E78.5 HYPERLIPIDEMIA LDL GOAL <100: ICD-10-CM

## 2017-08-25 DIAGNOSIS — R06.02 SOB (SHORTNESS OF BREATH): ICD-10-CM

## 2017-08-25 DIAGNOSIS — E66.01 MORBID OBESITY, UNSPECIFIED OBESITY TYPE (H): ICD-10-CM

## 2017-08-25 PROCEDURE — 99214 OFFICE O/P EST MOD 30 MIN: CPT | Mod: 25 | Performed by: INTERNAL MEDICINE

## 2017-08-25 PROCEDURE — 90471 IMMUNIZATION ADMIN: CPT | Performed by: INTERNAL MEDICINE

## 2017-08-25 PROCEDURE — 90714 TD VACC NO PRESV 7 YRS+ IM: CPT | Performed by: INTERNAL MEDICINE

## 2017-08-25 RX ORDER — SIMVASTATIN 20 MG
20 TABLET ORAL DAILY
Qty: 90 TABLET | Refills: 3 | Status: SHIPPED | OUTPATIENT
Start: 2017-08-25 | End: 2017-12-01

## 2017-08-25 RX ORDER — METFORMIN HCL 500 MG
500 TABLET, EXTENDED RELEASE 24 HR ORAL 2 TIMES DAILY
Qty: 180 TABLET | Refills: 1 | Status: SHIPPED | OUTPATIENT
Start: 2017-08-25 | End: 2017-11-27

## 2017-08-25 RX ORDER — LOSARTAN POTASSIUM AND HYDROCHLOROTHIAZIDE 12.5; 5 MG/1; MG/1
1 TABLET ORAL DAILY
Qty: 90 TABLET | Refills: 1 | Status: SHIPPED | OUTPATIENT
Start: 2017-08-25 | End: 2017-11-27

## 2017-08-25 RX ORDER — ALBUTEROL SULFATE 90 UG/1
2 AEROSOL, METERED RESPIRATORY (INHALATION) EVERY 6 HOURS
Qty: 1 INHALER | Refills: 3 | Status: SHIPPED | OUTPATIENT
Start: 2017-08-25 | End: 2019-02-06

## 2017-08-25 RX ORDER — GLIMEPIRIDE 1 MG/1
1 TABLET ORAL DAILY
Qty: 90 TABLET | Refills: 1 | Status: SHIPPED | OUTPATIENT
Start: 2017-08-25 | End: 2017-11-27

## 2017-08-25 NOTE — NURSING NOTE
"Chief Complaint   Patient presents with     Hypertension     Diabetes     Lipids       Initial /76 (BP Location: Right arm, Patient Position: Chair, Cuff Size: Adult Large)  Pulse 86  Temp 98.2  F (36.8  C) (Oral)  Ht 5' 7\" (1.702 m)  Wt 286 lb 8 oz (130 kg)  SpO2 95%  BMI 44.87 kg/m2 Estimated body mass index is 44.87 kg/(m^2) as calculated from the following:    Height as of this encounter: 5' 7\" (1.702 m).    Weight as of this encounter: 286 lb 8 oz (130 kg).  Medication Reconciliation: complete    "

## 2017-08-25 NOTE — PROGRESS NOTES
SUBJECTIVE:   Montez Valerio is a 47 year old male who presents to clinic today for the following health issues:    Diabetes Follow-up      Patient is checking blood sugars: 3-4 times a week, averages 100-150     Diabetic concerns: None     Symptoms of hypoglycemia (low blood sugar): none     Paresthesias (numbness or burning in feet) or sores: Numbness in right toe     Date of last diabetic eye exam: 8/24/2017    Hyperlipidemia Follow-Up      Rate your low fat/cholesterol diet?: good, Taking simvastatin 20 mg only 2-3 x wk.     Taking statin?  Yes, no muscle aches from statin    Other lipid medications/supplements?:  none    Hypertension Follow-up      Outpatient blood pressures are being checked at home.  Results are Normal.    Low Salt Diet: low salt    Patient also complains of having shortness of breath and chest tightness and  this has been going on and off for several years and usually occurs in the fall and winter. Also has a dry cough. Patient recently had a nuclear stress test and also echocardiogram which were normal. Has quit smoking a few years ago, was smoking occasional cigar. No history of asthma. Not on any inhalers at this time.        Amount of exercise or physical activity: 4-5 days/week for an average of greater than 60 minutes    Problems taking medications regularly: No    Medication side effects: none  Diet: low salt, low fat/cholesterol and diabetic      Patient Active Problem List   Diagnosis     Esophageal reflux     iamdcLUMB/LUMBOSAC DISC DEGEN     Mixed hyperlipidemia     Type 2 diabetes mellitus without complication (H)     HYPERLIPIDEMIA LDL GOAL <100     Morbid obesity (H)     Essential hypertension, benign     SOB (shortness of breath)     Chest pressure     Past Surgical History:   Procedure Laterality Date     ARTHROSCOPY KNEE  2/12/2014    Procedure: ARTHROSCOPY KNEE;  Right Knee Arthroscopy, Removal of Loose Body, Partial Lateral Meniscectomy  ;  Surgeon: Amado Rondon MD;   Location: [facility] OR     AXILLARY SURGERY      Mass removed left     HC KNEE SCOPE, DIAGNOSTIC  1984    Arthroscopy, Knee right     LAPAROSCOPIC CHOLECYSTECTOMY  12/21/2012    Procedure: LAPAROSCOPIC CHOLECYSTECTOMY;  LAPAROSCOPIC CHOLECYSTECTOMY;  Surgeon: Alton Riggs MD;  Location: [facility] OR     wisdom teeth[         Social History   Substance Use Topics     Smoking status: Current Some Day Smoker     Types: Cigars     Smokeless tobacco: Never Used      Comment: occ. cigar     Alcohol use Yes      Comment: 4-6 drinks, 1-2 nights week, beer/liquor     Family History   Problem Relation Age of Onset     Lipids Mother      CEREBROVASCULAR DISEASE Mother      Lipids Father      C.A.D. Maternal Grandfather      MI     C.A.D. Paternal Grandfather      MI     CANCER Paternal Grandmother      Pancreatic     Alcohol/Drug Sister      Psychotic Disorder Sister      MENTAL ILLNESS Sister          Current Outpatient Prescriptions   Medication Sig Dispense Refill     metFORMIN (GLUCOPHAGE-XR) 500 MG 24 hr tablet Take 1 tablet (500 mg) by mouth 2 times daily 180 tablet 1     simvastatin (ZOCOR) 20 MG tablet Take 1 tablet (20 mg) by mouth daily at bedtime. 90 tablet 3     losartan-hydrochlorothiazide (HYZAAR) 50-12.5 MG per tablet Take 1 tablet by mouth daily 90 tablet 1     glimepiride (AMARYL) 1 MG tablet Take 1 tablet (1 mg) by mouth daily 90 tablet 1     albuterol (PROAIR HFA/PROVENTIL HFA/VENTOLIN HFA) 108 (90 BASE) MCG/ACT Inhaler Inhale 2 puffs into the lungs every 6 hours 1 Inhaler 3     ibuprofen (ADVIL/MOTRIN) 200 MG tablet Take 600 mg by mouth 2 times daily       cholecalciferol (VITAMIN  -D) 1000 UNITS capsule Take 1 capsule by mouth daily       blood glucose monitoring (ONETOUCH VERIO) meter device kit Use to test blood sugars ONE times daily or as directed. 1 kit 0     blood glucose monitoring (ONE TOUCH DELICA) lancets Use to test blood sugars ONE times daily or as directed. 1 Box 3     blood glucose monitoring (ONE  "TOUCH VERIO IQ) test strip Use to test blood sugars ONE times daily or as directed. 100 each 3     blood glucose monitoring (ACCU-CHEK HOWARD PLUS) test strip by In Vitro route daily 100 each 1     [DISCONTINUED] metFORMIN (GLUCOPHAGE-XR) 500 MG 24 hr tablet TAKE 1 TABLET TWICE A DAY 30 tablet 0     [DISCONTINUED] glimepiride (AMARYL) 1 MG tablet TAKE 1 TABLET DAILY 15 tablet 0     [DISCONTINUED] losartan-hydrochlorothiazide (HYZAAR) 50-12.5 MG per tablet TAKE 1 TABLET DAILY 15 tablet 0     [DISCONTINUED] simvastatin (ZOCOR) 20 MG tablet Take 1 tablet (20 mg) by mouth daily at bedtime. (Patient taking differently: Take 20 mg by mouth Take one tablet 2-3 days week) 90 tablet 3         Reviewed and updated as needed this visit by clinical staffAllergies       Reviewed and updated as needed this visit by Provider         ROS:  C: NEGATIVE for fever, chills, change in weight  E/M: NEGATIVE for ear, mouth and throat problems  RESP:sob, chest tightness, dry cough   CV: NEGATIVE for chest pain, palpitations or peripheral edema  NEURO: NEGATIVE for weakness, dizziness or paresthesias  ENDOCRINE: NEGATIVE for temperature intolerance, skin/hair changes  ROS otherwise negative    OBJECTIVE:                                                    /76 (BP Location: Right arm, Patient Position: Chair, Cuff Size: Adult Large)  Pulse 86  Temp 98.2  F (36.8  C) (Oral)  Ht 5' 7\" (1.702 m)  Wt 286 lb 8 oz (130 kg)  SpO2 95%  BMI 44.87 kg/m2  Body mass index is 44.87 kg/(m^2).   GENERAL: healthy, alert, well nourished, well hydrated, no distress  EYES: Eyes grossly normal to inspection, extraocular movements - intact, and PERRL  HENT: ear canals- normal; TMs- normal; Nose- normal; Mouth- no ulcers, no lesions  NECK: no tenderness, no adenopathy, no asymmetry, no masses, no stiffness; thyroid- normal to palpation  RESP: lungs clear to auscultation - no rales, no rhonchi, no wheezes  CV: regular rates and rhythm, normal S1 S2,  "   MS: extremities- no gross deformities noted, no edema. No calf tenderness  NEURO: strength and tone- normal, sensory exam- grossly normal, mentation- intact, speech- normal, reflexes- symmetric  Diabetic foot exam: normal DP and PT pulses, no trophic changes or ulcerative lesions, normal sensory exam and normal monofilament exam        ASSESSMENT/PLAN:                                                        1. Type 2 diabetes mellitus without complication, without long-term current use of insulin (H)  --A1c has slightly increased since before. Patient was advised to follow ADA diet regular exercise, and continue same medications and recheck A1c in 6 months  - Refilled metFORMIN (GLUCOPHAGE-XR) 500 MG 24 hr tablet; Take 1 tablet (500 mg) by mouth 2 times daily and glimepiride (AMARYL) 1 MG tablet; Take 1 tablet (1 mg) by mouth daily as directed.explained clearly about the medication,insructions and side effects.      2. Hyperlipidemia LDL goal <100  --. at this time, patient has been taking simvastatin 20 mg 2-3 times a week. Patient was advised to take simvastatin 20 mg daily, and a repeat lipids and 3 months  - simvastatin (ZOCOR) 20 MG tablet; Take 1 tablet (20 mg) by mouth daily at bedtime.  Dispense: 90 tablet; Refill: 3.explained clearly about the medication,insructions and side effects.      3. Essential hypertension with goal blood pressure less than 140/90  --Blood pressure well controlled  - losartan-hydrochlorothiazide (HYZAAR) 50-12.5 MG per tablet; Take 1 tablet by mouth daily  Dispense: 90 tablet; Refill: 1.explained clearly about the medication,insructions and side effects.      4. SOB (shortness of breath)  --Recent nuclear stress test and echocardiogram, normal, probably secondary to allergies/asthma. Patient was advised to try albuterol (PROAIR HFA/PROVENTIL HFA/VENTOLIN HFA) 108 (90 BASE) MCG/ACT Inhaler; Inhale 2 puffs into the lungs every 6 hours as directed. If no relief, then would get  CT chest, pulmonary function test and referral to pulmonologist. Patient understands      5. Need for tetanus booster  - C TD PRSERV FREE >=7 YRS ADS IM      6. Morbid obesity, unspecified obesity type (H)   -Discussed in detail about Diet,calorie intake,and importance of regular exercise, pt  will strive towards this      Edna Younger MD  [facility]

## 2017-08-25 NOTE — MR AVS SNAPSHOT
After Visit Summary   8/25/2017    Montez Valerio    MRN: [id number]           Patient Information     Date Of Birth          1970        Visit Information        Provider Department      8/25/2017 11:00 AM Edna Younger MD [facility]        Today's Diagnoses     Need for tetanus booster    -  1    Type 2 diabetes mellitus without complication, without long-term current use of insulin (H)        Hyperlipidemia LDL goal <100        Essential hypertension with goal blood pressure less than 140/90        SOB (shortness of breath)           Follow-ups after your visit        Your next 10 appointments already scheduled     Nov 17, 2017 10:00 AM CST   LAB with [facility]   [facility] ([facility])    [address]  [facility] [address] [phone]           Patient must bring picture ID. Patient should be prepared to give a urine specimen  Please do not eat 10-12 hours before your appointment if you are coming in fasting for labs on lipids, cholesterol, or glucose (sugar). Pregnant women should follow their Care Team instructions. Water with medications is okay. Do not drink coffee or other fluids. If you have concerns about taking  your medications, please ask at office or if scheduling via [employer], send a message by clicking on Secure Messaging, Message Your Care Team.              Who to contact     If you have questions or need follow up information about today's clinic visit or your schedule please contact [facility] directly at [phone].  Normal or non-critical lab and imaging results will be communicated to you by MyChart, letter or phone within 4 business days after the clinic has received the results. If you do not hear from us within 7 days, please contact the clinic through MyChart or phone. If you have a critical or abnormal lab result, we will notify you by phone as soon as possible.  Submit  "refill requests through [employer] or call your pharmacy and they will forward the refill request to us. Please allow 3 business days for your refill to be completed.          Additional Information About Your Visit        Razz[employer] Information     [employer] gives you secure access to your electronic health record. If you see a primary care provider, you can also send messages to your care team and make appointments. If you have questions, please call your primary care clinic.  If you do not have a primary care provider, please call [phone] and they will assist you.        Care EveryWhere ID     This is your Care EveryWhere ID. This could be used by other organizations to access your [facility] medical records  [id number]        Your Vitals Were     Pulse Temperature Height Pulse Oximetry BMI (Body Mass Index)       86 98.2  F (36.8  C) (Oral) 5' 7\" (1.702 m) 95% 44.87 kg/m2        Blood Pressure from Last 3 Encounters:   08/25/17 110/76   04/26/17 136/88   11/08/16 104/80    Weight from Last 3 Encounters:   08/25/17 286 lb 8 oz (130 kg)   04/26/17 283 lb 11.2 oz (128.7 kg)   11/08/16 281 lb 8 oz (127.7 kg)              We Performed the Following     C TD PRSERV FREE >=7 YRS ADS IM          Today's Medication Changes          These changes are accurate as of: 8/25/17 11:43 AM.  If you have any questions, ask your nurse or doctor.               Start taking these medicines.        Dose/Directions    albuterol 108 (90 BASE) MCG/ACT Inhaler   Commonly known as:  PROAIR HFA/PROVENTIL HFA/VENTOLIN HFA   Used for:  SOB (shortness of breath)   Started by:  Edna Younger MD        Dose:  2 puff   Inhale 2 puffs into the lungs every 6 hours   Quantity:  1 Inhaler   Refills:  3         These medicines have changed or have updated prescriptions.        Dose/Directions    glimepiride 1 MG tablet   Commonly known as:  AMARYL   This may have changed:  See the new instructions.   Used for:  Type 2 diabetes mellitus " without complication, without long-term current use of insulin (H)   Changed by:  Edna Younger MD        Dose:  1 mg   Take 1 tablet (1 mg) by mouth daily   Quantity:  90 tablet   Refills:  1       losartan-hydrochlorothiazide 50-12.5 MG per tablet   Commonly known as:  HYZAAR   This may have changed:  See the new instructions.   Used for:  Essential hypertension with goal blood pressure less than 140/90   Changed by:  Edna Younger MD        Dose:  1 tablet   Take 1 tablet by mouth daily   Quantity:  90 tablet   Refills:  1       metFORMIN 500 MG 24 hr tablet   Commonly known as:  GLUCOPHAGE-XR   This may have changed:  See the new instructions.   Used for:  Type 2 diabetes mellitus without complication, without long-term current use of insulin (H)   Changed by:  Edna Younger MD        Dose:  500 mg   Take 1 tablet (500 mg) by mouth 2 times daily   Quantity:  180 tablet   Refills:  1       simvastatin 20 MG tablet   Commonly known as:  ZOCOR   This may have changed:    - when to take this  - additional instructions   Used for:  Hyperlipidemia LDL goal <100        Dose:  20 mg   Take 1 tablet (20 mg) by mouth daily at bedtime.   Quantity:  90 tablet   Refills:  3            Where to get your medicines      These medications were sent to [facility] [address] [facility] [place] - [address]  [address] [place] [address]     Phone:  [phone]     albuterol 108 (90 BASE) MCG/ACT Inhaler    glimepiride 1 MG tablet    losartan-hydrochlorothiazide 50-12.5 MG per tablet    metFORMIN 500 MG 24 hr tablet    simvastatin 20 MG tablet                Primary Care Provider Office Phone # Fax #    Edna Younger [phone]       [address] [facility] [address]        Equal Access to Services     BOZENA GONZALES AH: Manolo Grimes, wagrzegorz luqadaha, qaybta kaalmacathi brasher, tavo lawrence. So [facility]  [phone].    ATENCIÓN: Si loraine tolentino, tiene a man disposición servicios gratuitos de asistencia lingüística. Joseph hernandez [phone].    We comply with applicable federal civil rights laws and [place] laws. We do not discriminate on the basis of race, color, national origin, age, disability sex, sexual orientation or gender identity.            Thank you!     Thank you for choosing [facility]  for your care. Our goal is always to provide you with excellent care. Hearing back from our patients is one way we can continue to improve our services. Please take a few minutes to complete the written survey that you may receive in the mail after your visit with us. Thank you!             Your Updated Medication List - Protect others around you: Learn how to safely use, store and throw away your medicines at www.disposemymeds.org.          This list is accurate as of: 8/25/17 11:43 AM.  Always use your most recent med list.                   Brand Name Dispense Instructions for use Diagnosis    albuterol 108 (90 BASE) MCG/ACT Inhaler    PROAIR HFA/PROVENTIL HFA/VENTOLIN HFA    1 Inhaler    Inhale 2 puffs into the lungs every 6 hours    SOB (shortness of breath)       blood glucose monitoring lancets     1 Box    Use to test blood sugars ONE times daily or as directed.    Type 2 diabetes mellitus without complication (H)       blood glucose monitoring meter device kit     1 kit    Use to test blood sugars ONE times daily or as directed.    Type 2 diabetes mellitus without complication (H)       * blood glucose monitoring test strip    ACCU-CHEK HOWARD PLUS    100 each    by In Vitro route daily    Type 2 diabetes mellitus without complication, without long-term current use of insulin (H)       * blood glucose monitoring test strip    ONE TOUCH VERIO IQ    100 each    Use to test blood sugars ONE times daily or as directed.    Type 2 diabetes mellitus without complication (H)       cholecalciferol 1000  UNITS capsule    vitamin  -D     Take 1 capsule by mouth daily        glimepiride 1 MG tablet    AMARYL    90 tablet    Take 1 tablet (1 mg) by mouth daily    Type 2 diabetes mellitus without complication, without long-term current use of insulin (H)       ibuprofen 200 MG tablet    ADVIL/MOTRIN     Take 600 mg by mouth 2 times daily        losartan-hydrochlorothiazide 50-12.5 MG per tablet    HYZAAR    90 tablet    Take 1 tablet by mouth daily    Essential hypertension with goal blood pressure less than 140/90       metFORMIN 500 MG 24 hr tablet    GLUCOPHAGE-XR    180 tablet    Take 1 tablet (500 mg) by mouth 2 times daily    Type 2 diabetes mellitus without complication, without long-term current use of insulin (H)       simvastatin 20 MG tablet    ZOCOR    90 tablet    Take 1 tablet (20 mg) by mouth daily at bedtime.    Hyperlipidemia LDL goal <100       * Notice:  This list has 2 medication(s) that are the same as other medications prescribed for you. Read the directions carefully, and ask your doctor or other care provider to review them with you.

## 2017-10-20 ENCOUNTER — DOCUMENTATION ONLY (OUTPATIENT)
Dept: LAB | Facility: CLINIC | Age: 47
End: 2017-10-20

## 2017-10-20 DIAGNOSIS — E11.9 TYPE 2 DIABETES MELLITUS WITHOUT COMPLICATION, WITHOUT LONG-TERM CURRENT USE OF INSULIN (H): Primary | ICD-10-CM

## 2017-10-20 NOTE — LETTER
[place] [address]  [facility] [address]  [phone]        March 6, 2018    Montez Valerio  [address] [place] [address]              Dear Montez Valerio    This is to remind you that your FASTING LAB is due.    You may call our office at [phone] to schedule an appointment.    Please disregard this notice if you have already had your labs drawn or made an appointment.        Sincerely,        Edna Younger MD

## 2017-10-20 NOTE — LETTER
[place] [address]  [facility] [address]  [phone]        December 27, 2017    Montez Valerio  [address] [place] [address]              Dear Montez Valerio    This is to remind you that your FASTING LAB is due.    You may call our office at [phone] to schedule an appointment.    Please disregard this notice if you have already had your labs drawn or made an appointment.        Sincerely,        Edna Younger MD

## 2017-11-27 ENCOUNTER — MYC MEDICAL ADVICE (OUTPATIENT)
Dept: INTERNAL MEDICINE | Facility: CLINIC | Age: 47
End: 2017-11-27

## 2017-11-27 DIAGNOSIS — E11.9 TYPE 2 DIABETES MELLITUS WITHOUT COMPLICATION, WITHOUT LONG-TERM CURRENT USE OF INSULIN (H): ICD-10-CM

## 2017-11-27 DIAGNOSIS — I10 ESSENTIAL HYPERTENSION WITH GOAL BLOOD PRESSURE LESS THAN 140/90: ICD-10-CM

## 2017-11-28 RX ORDER — METFORMIN HCL 500 MG
500 TABLET, EXTENDED RELEASE 24 HR ORAL 2 TIMES DAILY
Qty: 180 TABLET | Refills: 0 | Status: SHIPPED | OUTPATIENT
Start: 2017-11-28 | End: 2018-04-13

## 2017-11-28 RX ORDER — GLIMEPIRIDE 1 MG/1
1 TABLET ORAL DAILY
Qty: 90 TABLET | Refills: 0 | Status: SHIPPED | OUTPATIENT
Start: 2017-11-28 | End: 2018-04-13

## 2017-11-28 RX ORDER — LOSARTAN POTASSIUM AND HYDROCHLOROTHIAZIDE 12.5; 5 MG/1; MG/1
1 TABLET ORAL DAILY
Qty: 90 TABLET | Refills: 0 | Status: SHIPPED | OUTPATIENT
Start: 2017-11-28 | End: 2018-04-13

## 2017-11-29 DIAGNOSIS — E78.5 HYPERLIPIDEMIA LDL GOAL <100: ICD-10-CM

## 2017-12-01 RX ORDER — SIMVASTATIN 20 MG
20 TABLET ORAL DAILY
Qty: 90 TABLET | Refills: 2 | Status: SHIPPED | OUTPATIENT
Start: 2017-12-01 | End: 2018-06-28

## 2017-12-01 RX ORDER — SIMVASTATIN 20 MG
TABLET ORAL
Qty: 90 TABLET | Refills: 3 | OUTPATIENT
Start: 2017-12-01

## 2017-12-01 NOTE — TELEPHONE ENCOUNTER
Last rx sent 08/25/17 for 1 year supply. Request is for a mail order pharm (pharm change). Sent remaining refills to new pharm.

## 2018-04-13 DIAGNOSIS — E11.9 TYPE 2 DIABETES MELLITUS WITHOUT COMPLICATION, WITHOUT LONG-TERM CURRENT USE OF INSULIN (H): ICD-10-CM

## 2018-04-13 DIAGNOSIS — I10 ESSENTIAL HYPERTENSION WITH GOAL BLOOD PRESSURE LESS THAN 140/90: ICD-10-CM

## 2018-04-13 RX ORDER — METFORMIN HCL 500 MG
TABLET, EXTENDED RELEASE 24 HR ORAL
Qty: 180 TABLET | Refills: 0 | Status: SHIPPED | OUTPATIENT
Start: 2018-04-13 | End: 2018-06-28

## 2018-04-13 RX ORDER — GLIMEPIRIDE 1 MG/1
TABLET ORAL
Qty: 90 TABLET | Refills: 0 | Status: SHIPPED | OUTPATIENT
Start: 2018-04-13 | End: 2018-06-28

## 2018-04-13 RX ORDER — LOSARTAN POTASSIUM AND HYDROCHLOROTHIAZIDE 12.5; 5 MG/1; MG/1
TABLET ORAL
Qty: 90 TABLET | Refills: 0 | Status: SHIPPED | OUTPATIENT
Start: 2018-04-13 | End: 2018-06-28

## 2018-06-27 ENCOUNTER — OFFICE VISIT (OUTPATIENT)
Dept: INTERNAL MEDICINE | Facility: CLINIC | Age: 48
End: 2018-06-27
Payer: COMMERCIAL

## 2018-06-27 VITALS
SYSTOLIC BLOOD PRESSURE: 124 MMHG | TEMPERATURE: 98.1 F | BODY MASS INDEX: 45.11 KG/M2 | HEIGHT: 67 IN | WEIGHT: 287.4 LBS | OXYGEN SATURATION: 99 % | DIASTOLIC BLOOD PRESSURE: 82 MMHG | HEART RATE: 96 BPM | RESPIRATION RATE: 15 BRPM

## 2018-06-27 DIAGNOSIS — I10 ESSENTIAL HYPERTENSION WITH GOAL BLOOD PRESSURE LESS THAN 140/90: ICD-10-CM

## 2018-06-27 DIAGNOSIS — R06.02 SOB (SHORTNESS OF BREATH): ICD-10-CM

## 2018-06-27 DIAGNOSIS — E11.9 TYPE 2 DIABETES MELLITUS WITHOUT COMPLICATION, WITHOUT LONG-TERM CURRENT USE OF INSULIN (H): ICD-10-CM

## 2018-06-27 DIAGNOSIS — E66.01 MORBID OBESITY (H): ICD-10-CM

## 2018-06-27 DIAGNOSIS — E78.5 HYPERLIPIDEMIA LDL GOAL <100: ICD-10-CM

## 2018-06-27 DIAGNOSIS — K42.9 UMBILICAL HERNIA WITHOUT OBSTRUCTION AND WITHOUT GANGRENE: ICD-10-CM

## 2018-06-27 DIAGNOSIS — L98.9 SKIN DISORDER: ICD-10-CM

## 2018-06-27 DIAGNOSIS — Z00.00 ENCOUNTER FOR ROUTINE ADULT HEALTH EXAMINATION WITHOUT ABNORMAL FINDINGS: Primary | ICD-10-CM

## 2018-06-27 LAB
FEF 25/75: 5.91
FEV-1: 3.78
FEV1/FVC: 88
FVC: 4.31
HBA1C MFR BLD: 8.5 % (ref 0–5.6)
HGB BLD-MCNC: 14.8 G/DL (ref 13.3–17.7)

## 2018-06-27 PROCEDURE — 94010 BREATHING CAPACITY TEST: CPT | Performed by: INTERNAL MEDICINE

## 2018-06-27 PROCEDURE — 99213 OFFICE O/P EST LOW 20 MIN: CPT | Mod: 25 | Performed by: INTERNAL MEDICINE

## 2018-06-27 PROCEDURE — 82043 UR ALBUMIN QUANTITATIVE: CPT | Performed by: INTERNAL MEDICINE

## 2018-06-27 PROCEDURE — 80061 LIPID PANEL: CPT | Performed by: INTERNAL MEDICINE

## 2018-06-27 PROCEDURE — 84443 ASSAY THYROID STIM HORMONE: CPT | Performed by: INTERNAL MEDICINE

## 2018-06-27 PROCEDURE — 36415 COLL VENOUS BLD VENIPUNCTURE: CPT | Performed by: INTERNAL MEDICINE

## 2018-06-27 PROCEDURE — 80053 COMPREHEN METABOLIC PANEL: CPT | Performed by: INTERNAL MEDICINE

## 2018-06-27 PROCEDURE — G0103 PSA SCREENING: HCPCS | Performed by: INTERNAL MEDICINE

## 2018-06-27 PROCEDURE — 99396 PREV VISIT EST AGE 40-64: CPT | Mod: 25 | Performed by: INTERNAL MEDICINE

## 2018-06-27 PROCEDURE — 83036 HEMOGLOBIN GLYCOSYLATED A1C: CPT | Performed by: INTERNAL MEDICINE

## 2018-06-27 PROCEDURE — 85018 HEMOGLOBIN: CPT | Performed by: INTERNAL MEDICINE

## 2018-06-27 NOTE — PROGRESS NOTES
SUBJECTIVE:   CC: Montez Valerio is an 48 year old male who presents for preventative health visit.     Physical   Annual:     Getting at least 3 servings of Calcium per day:  NO    Bi-annual eye exam:  Yes    Dental care twice a year:  Yes    Sleep apnea or symptoms of sleep apnea:  Sleep apnea    Diet:  Diabetic    Frequency of exercise:  2-3 days/week    Duration of exercise:  15-30 minutes    Taking medications regularly:  Yes    Medication side effects:  None    Additional concerns today:  YES      Pt c/o lump in the umbilical area since 1-2 years, has some discomfort occasionally patient states he can reduce the lump     Patient also complains of mole on right temple since few years has been slowly getting bigger, no itching.    Patient also complains of on and off shortness of breath since few years has been evaluated by cardiology and  Has had  negative stress test. No wheezing.no h/o asthma or allergies.      Diabetes Follow-up      Patient is checking blood sugars: rarely.  Results range from 140 to 200    Diabetic concerns: None     Symptoms of hypoglycemia (low blood sugar): none     Paresthesias (numbness or burning in feet) or sores: No     Date of last diabetic eye exam: 1 yr ago      Hyperlipidemia Follow-Up      Rate your low fat/cholesterol diet?: good    Taking statin?  Yes, no muscle aches from statin    Other lipid medications/supplements?:  none    Hypertension Follow-up      Outpatient blood pressures are being checked at store.  Results are normal.    Low Salt Diet: not monitoring salt    BP Readings from Last 2 Encounters:   06/27/18 124/82   08/25/17 110/76     Hemoglobin A1C (%)   Date Value   06/28/2017 7.2 (H)   11/08/2016 6.9 (H)     LDL Cholesterol Calculated (mg/dL)   Date Value   06/28/2017 118 (H)   11/08/2016 86       Today's PHQ-2 Score:   PHQ-2 ( 1999 [employer]) 6/27/2018   Q1: Little interest or pleasure in doing things 0   Q2: Feeling down, depressed or hopeless 0   PHQ-2 Score 0    Q1: Little interest or pleasure in doing things Several days   Q2: Feeling down, depressed or hopeless Several days   PHQ-2 Score 2       Abuse: Current or Past(Physical, Sexual or Emotional)- No  Do you feel safe in your environment - Yes      Past Medical History:   Diagnosis Date     Arthritis     Neck     Diabetes mellitus (H)     type 2     Dyspnea on exertion      Gastro-oesophageal reflux disease      Hypertension      Other chronic pain     neck pain     PONV (postoperative nausea and vomiting)      Sleep apnea     CPAP     SOB (shortness of breath) 4/26/2017         Past Surgical History:   Procedure Laterality Date     ARTHROSCOPY KNEE  2/12/2014    Procedure: ARTHROSCOPY KNEE;  Right Knee Arthroscopy, Removal of Loose Body, Partial Lateral Meniscectomy  ;  Surgeon: Amado Rondon MD;  Location: [facility] OR     AXILLARY SURGERY      Mass removed left     HC KNEE SCOPE, DIAGNOSTIC  1984    Arthroscopy, Knee right     LAPAROSCOPIC CHOLECYSTECTOMY  12/21/2012    Procedure: LAPAROSCOPIC CHOLECYSTECTOMY;  LAPAROSCOPIC CHOLECYSTECTOMY;  Surgeon: Alton Riggs MD;  Location: [facility] OR     wisdom teeth[         Current Outpatient Prescriptions   Medication Sig Dispense Refill     albuterol (PROAIR HFA/PROVENTIL HFA/VENTOLIN HFA) 108 (90 BASE) MCG/ACT Inhaler Inhale 2 puffs into the lungs every 6 hours 1 Inhaler 3     blood glucose monitoring (ACCU-CHEK HOWARD PLUS) test strip by In Vitro route daily 100 each 1     blood glucose monitoring (ONE TOUCH DELICA) lancets Use to test blood sugars ONE times daily or as directed. 1 Box 3     blood glucose monitoring (ONE TOUCH VERIO IQ) test strip Use to test blood sugars ONE times daily or as directed. 100 each 3     blood glucose monitoring (ONETOUCH VERIO) meter device kit Use to test blood sugars ONE times daily or as directed. 1 kit 0     cholecalciferol (VITAMIN  -D) 1000 UNITS capsule Take 1 capsule by mouth daily       glimepiride (AMARYL) 1 MG tablet TAKE 1 TABLET  DAILY 90 tablet 0     ibuprofen (ADVIL/MOTRIN) 200 MG tablet Take 600 mg by mouth 2 times daily       losartan-hydrochlorothiazide (HYZAAR) 50-12.5 MG per tablet TAKE 1 TABLET DAILY 90 tablet 0     metFORMIN (GLUCOPHAGE-XR) 500 MG 24 hr tablet TAKE 1 TABLET TWICE A  tablet 0     simvastatin (ZOCOR) 20 MG tablet Take 1 tablet (20 mg) by mouth daily at bedtime. 90 tablet 2       Family History   Problem Relation Age of Onset     Lipids Mother      Cerebrovascular Disease Mother      Lipids Father      C.A.D. Maternal Grandfather      MI     C.A.D. Paternal Grandfather      MI     Cancer Paternal Grandmother      Pancreatic     Alcohol/Drug Sister      Psychotic Disorder Sister      Mental Illness Sister        Social History   Substance Use Topics     Smoking status: Former Smoker     Types: Cigars, cigarillos or filtered cigars     Smokeless tobacco: Never Used      Comment: occ. cigar     Alcohol use Yes      Comment: 4-6 drinks, 1-2 nights week, beer/liquor     Alcohol Use 6/27/2018   If you drink alcohol do you typically have greater than 3 drinks per day OR greater than 7 drinks per week? No   No flowsheet data found.    Last PSA:   PSA   Date Value Ref Range Status   08/25/2015 1.28 0 - 4 ug/L Final       Reviewed orders with patient. Reviewed health maintenance and updated orders accordingly - Yes  Labs reviewed in EPIC    Reviewed and updated as needed this visit by clinical staff  Tobacco  Allergies  Meds  Med Hx  Surg Hx  Fam Hx  Soc Hx        Reviewed and updated as needed this visit by Provider            Review of Systems  CONSTITUTIONAL: NEGATIVE for fever, chills, change in weight  INTEGUMENTARY/SKIN: mole on rt temple  EYES: NEGATIVE for vision changes or irritation  ENT: NEGATIVE for ear, mouth and throat problems  RESP: NEGATIVE for significant cough or SOB  CV: NEGATIVE for chest pain, palpitations or peripheral edema  GI: lump umbilical area, NEGATIVE for nausea, abdominal pain,  "heartburn, or change in bowel habits,   male: negative for dysuria, hematuria, decreased urinary stream, erectile dysfunction, urethral discharge  MUSCULOSKELETAL: NEGATIVE for significant arthralgias or myalgia  NEURO: NEGATIVE for weakness, dizziness or paresthesias  PSYCHIATRIC: NEGATIVE for changes in mood or affect    OBJECTIVE:   /82  Pulse 96  Temp 98.1  F (36.7  C) (Oral)  Resp 15  Ht 5' 7\" (1.702 m)  Wt 287 lb 6.4 oz (130.4 kg)  SpO2 99%  BMI 45.01 kg/m2    Physical Exam  GENERAL: healthy, alert and no distress  EYES: Eyes grossly normal to inspection, PERRL and conjunctivae and sclerae normal  HENT: ear canals and TM's normal, nose and mouth without ulcers or lesions  NECK: no adenopathy, no asymmetry, masses, or scars and thyroid normal to palpation  RESP: lungs clear to auscultation - no rales, rhonchi or wheezes  CV: regular rate and rhythm, normal S1 S2, no S3 or S4, no murmur, click or rub, no peripheral edema and peripheral pulses strong  ABDOMEN: umbilical hernia felt, reducible. soft, nontender, no hepatosplenomegaly, no masses and bowel sounds normal  RECTAL: normal sphincter tone, no rectal masses, prostate normal size, smooth, nontender without nodules or masses  MS: no gross musculoskeletal defects noted, no edema  SKIN: brown irregular mole noted rt temple area  NEURO: Normal strength and tone, mentation intact and speech normal  PSYCH: mentation appears normal, affect normal/bright      ASSESSMENT/PLAN:             (Z00.00) Encounter for routine adult health examination without abnormal findings  (primary encounter diagnosis)  Plan: Hemoglobin, Comprehensive metabolic panel,         Lipid panel reflex to direct LDL Fasting, TSH         with free T4 reflex, Prostate spec antigen         screen            (E11.9) Type 2 diabetes mellitus without complication, without long-term current use of insulin (H)  Plan: Diabetes uncontrolled has elevated blood sugar and elevated A1c, will " "increase metFORMIN (GLUCOPHAGE-XR) 500 MG 24 hr tablet to 1000 mg twice daily, continue glimepiride (AMARYL) 1 MG tablet, as directed.explained clearly about the medication,insructions and side effects.  Continue to follow ADA diet regular exercise and repeat A1c in 3 months  TSH with free T4 reflex, Hemoglobin A1c, Albumin Random Urine Quantitative with Creat Ratio,             (E78.5) Hyperlipidemia LDL goal <100  Plan: LDL slightly elevated continue simvastatin (ZOCOR) 20 MG tablet at this time, follow low-fat diet regular exercise repeat lipids in 3 months            (K42.9) Umbilical hernia without obstruction and without gangrene  Plan: Explained about the condition, referred to surgery GENERAL SURG ADULT REFERRAL           (L98.9) Skin disorder  Plan: DERMATOLOGY REFERRAL            (R06.02) SOB (shortness of breath)  Comment: Evaluated by cardiology with negative nuclear stress  Plan: Will obtain spirometry, Breathing Capacity: Normal Order,         Clinic Performed, -cardiology had recommended CT angiogram if symptoms persist follow-up with cardiology.           (I10) Essential hypertension with goal blood pressure less than 140/90  Comment: Blood pressure stable  Plan: losartan-hydrochlorothiazide (HYZAAR) 50-12.5         MG per tablet refilled.explained clearly about the medication,insructions and side effects. Advised to follow low salt diet and exercise and f/u in 6 mths.              (E66.01) Morbid obesity (H)  Plan:  Discussed in detail about Diet,calorie intake,and importance of regular exercise .       COUNSELING:   Reviewed preventive health counseling, as reflected in patient instructions       Regular exercise       Healthy diet/nutrition    BP Readings from Last 1 Encounters:   06/27/18 124/82     Estimated body mass index is 45.01 kg/(m^2) as calculated from the following:    Height as of this encounter: 5' 7\" (1.702 m).    Weight as of this encounter: 287 lb 6.4 oz (130.4 kg).      Weight " management plan: Discussed healthy diet and exercise guidelines and patient will follow up in 12 months in clinic to re-evaluate.     reports that he has quit smoking. His smoking use included Cigars, cigarillos or filtered cigars. He has never used smokeless tobacco.    Additional 15 minutes is spent with the patient discussing multiple health concerns; Over 50% Counselling/Education provided.    Counseling Resources:  ATP IV Guidelines  Pooled Cohorts Equation Calculator  FRAX Risk Assessment  ICSI Preventive Guidelines  Dietary Guidelines for Americans, 2010  USDA's MyPlate  ASA Prophylaxis  Lung CA Screening    Edna Younger MD  [facility]  Answers for HPI/ROS submitted by the patient on 6/27/2018   PHQ-2 Score: 2

## 2018-06-27 NOTE — MR AVS SNAPSHOT
After Visit Summary   6/27/2018    Montez Valerio    MRN: [id number]           Patient Information     Date Of Birth          1970        Visit Information        Provider Department      6/27/2018 9:40 AM Edna Younger MD [facility]        Today's Diagnoses     Encounter for routine adult health examination without abnormal findings    -  1    Type 2 diabetes mellitus without complication, without long-term current use of insulin (H)        Hyperlipidemia LDL goal <100        Umbilical hernia without obstruction and without gangrene          Care Instructions      Preventive Health Recommendations  Male Ages 50 - 64    Yearly exam:             See your health care provider every year in order to  o   Review health changes.   o   Discuss preventive care.    o   Review your medicines if your doctor has prescribed any.     Have a cholesterol test every 5 years, or more frequently if you are at risk for high cholesterol/heart disease.     Have a diabetes test (fasting glucose) every three years. If you are at risk for diabetes, you should have this test more often.     Have a colonoscopy at age 50, or have a yearly FIT test (stool test). These exams will check for colon cancer.      Talk with your health care provider about whether or not a prostate cancer screening test (PSA) is right for you.    You should be tested each year for STDs (sexually transmitted diseases), if you re at risk.     Shots: Get a flu shot each year. Get a tetanus shot every 10 years.     Nutrition:    Eat at least 5 servings of fruits and vegetables daily.     Eat whole-grain bread, whole-wheat pasta and brown rice instead of white grains and rice.     Get adequate Calcium and Vitamin D.     Lifestyle    Exercise for at least 150 minutes a week (30 minutes a day, 5 days a week). This will help you control your weight and prevent disease.     Limit alcohol to one drink per day.     No smoking.      Wear sunscreen to prevent skin cancer.     See your dentist every six months for an exam and cleaning.     See your eye doctor every 1 to 2 years.            Follow-ups after your visit        Additional Services     GENERAL SURG ADULT REFERRAL       Your provider has referred you to: [facility]: [facility] [place] [phone]   [email or web address]    Please be aware that coverage of these services is subject to the terms and limitations of your health insurance plan.  Call member services at your health plan with any benefit or coverage questions.      Please bring the following with you to your appointment:    (1) Any X-Rays, CTs or MRIs which have been performed.  Contact the facility where they were done to arrange for  prior to your scheduled appointment.   (2) List of current medications   (3) This referral request   (4) Any documents/labs given to you for this referral                  Who to contact     If you have questions or need follow up information about today's clinic visit or your schedule please contact [facility] directly at [phone].  Normal or non-critical lab and imaging results will be communicated to you by [employer], letter or phone within 4 business days after the clinic has received the results. If you do not hear from us within 7 days, please contact the clinic through [employer] or phone. If you have a critical or abnormal lab result, we will notify you by phone as soon as possible.  Submit refill requests through [employer] or call your pharmacy and they will forward the refill request to us. Please allow 3 business days for your refill to be completed.          Additional Information About Your Visit        [employer] Information     [employer] gives you secure access to your electronic health record. If you see a primary care provider, you can also send messages to your care team and make appointments. If you have  "questions, please call your primary care clinic.  If you do not have a primary care provider, please call [phone] and they will assist you.        Care EveryWhere ID     This is your Care EveryWhere ID. This could be used by other organizations to access your [facility] medical records  [id number]        Your Vitals Were     Pulse Temperature Respirations Height Pulse Oximetry BMI (Body Mass Index)    96 98.1  F (36.7  C) (Oral) 15 5' 7\" (1.702 m) 99% 45.01 kg/m2       Blood Pressure from Last 3 Encounters:   06/27/18 124/82   08/25/17 110/76   04/26/17 136/88    Weight from Last 3 Encounters:   06/27/18 287 lb 6.4 oz (130.4 kg)   08/25/17 286 lb 8 oz (130 kg)   04/26/17 283 lb 11.2 oz (128.7 kg)              We Performed the Following     Albumin Random Urine Quantitative with Creat Ratio     Comprehensive metabolic panel     GENERAL SURG ADULT REFERRAL     Hemoglobin A1c     Hemoglobin     Lipid panel reflex to direct LDL Fasting     Prostate spec antigen screen     TSH with free T4 reflex        Primary Care Provider Office Phone # Fax #    Krishnakumari G MD A[place] [phone]       [address] [facility] [address]        Equal Access to Services     BOZENA GONZALES : Hadii aad ku hadasho Soomaali, waaxda luqadaha, qaybta kaalmada adeegyada, waxay idiin hayida lawrence. So [facility] [phone].    ATENCIÓN: Si habla español, tiene a man disposición servicios gratuitos de asistencia lingüística. Llame al [phone].    We comply with applicable federal civil rights laws and [place] laws. We do not discriminate on the basis of race, color, national origin, age, disability, sex, sexual orientation, or gender identity.            Thank you!     Thank you for choosing [facility]  for your care. Our goal is always to provide you with excellent care. Hearing back from our patients is one way we can continue to improve our services. Please take a few minutes to " complete the written survey that you may receive in the mail after your visit with us. Thank you!             Your Updated Medication List - Protect others around you: Learn how to safely use, store and throw away your medicines at www.disposemymeds.org.          This list is accurate as of 6/27/18 10:15 AM.  Always use your most recent med list.                   Brand Name Dispense Instructions for use Diagnosis    albuterol 108 (90 Base) MCG/ACT Inhaler    PROAIR HFA/PROVENTIL HFA/VENTOLIN HFA    1 Inhaler    Inhale 2 puffs into the lungs every 6 hours    SOB (shortness of breath)       blood glucose monitoring lancets     1 Box    Use to test blood sugars ONE times daily or as directed.    Type 2 diabetes mellitus without complication (H)       blood glucose monitoring meter device kit     1 kit    Use to test blood sugars ONE times daily or as directed.    Type 2 diabetes mellitus without complication (H)       * blood glucose monitoring test strip    ACCU-CHEK HOWARD PLUS    100 each    by In Vitro route daily    Type 2 diabetes mellitus without complication, without long-term current use of insulin (H)       * blood glucose monitoring test strip    ONETOUCH VERIO IQ    100 each    Use to test blood sugars ONE times daily or as directed.    Type 2 diabetes mellitus without complication (H)       cholecalciferol 1000 units capsule    vitamin  -D     Take 1 capsule by mouth daily        glimepiride 1 MG tablet    AMARYL    90 tablet    TAKE 1 TABLET DAILY    Type 2 diabetes mellitus without complication, without long-term current use of insulin (H)       ibuprofen 200 MG tablet    ADVIL/MOTRIN     Take 600 mg by mouth 2 times daily        losartan-hydrochlorothiazide 50-12.5 MG per tablet    HYZAAR    90 tablet    TAKE 1 TABLET DAILY    Essential hypertension with goal blood pressure less than 140/90       metFORMIN 500 MG 24 hr tablet    GLUCOPHAGE-XR    180 tablet    TAKE 1 TABLET TWICE A DAY    Type 2 diabetes  mellitus without complication, without long-term current use of insulin (H)       simvastatin 20 MG tablet    ZOCOR    90 tablet    Take 1 tablet (20 mg) by mouth daily at bedtime.    Hyperlipidemia LDL goal <100       * Notice:  This list has 2 medication(s) that are the same as other medications prescribed for you. Read the directions carefully, and ask your doctor or other care provider to review them with you.

## 2018-06-27 NOTE — NURSING NOTE
"/82  Pulse 96  Temp 98.1  F (36.7  C) (Oral)  Resp 15  Ht 5' 7\" (1.702 m)  Wt 287 lb 6.4 oz (130.4 kg)  SpO2 99%  BMI 45.01 kg/m2  Patient in for annual male physical  Jayne Smith CMA    "

## 2018-06-28 ENCOUNTER — DOCUMENTATION ONLY (OUTPATIENT)
Dept: INTERNAL MEDICINE | Facility: CLINIC | Age: 48
End: 2018-06-28

## 2018-06-28 LAB
ALBUMIN SERPL-MCNC: 4.2 G/DL (ref 3.4–5)
ALP SERPL-CCNC: 71 U/L (ref 40–150)
ALT SERPL W P-5'-P-CCNC: 57 U/L (ref 0–70)
ANION GAP SERPL CALCULATED.3IONS-SCNC: 10 MMOL/L (ref 3–14)
AST SERPL W P-5'-P-CCNC: 26 U/L (ref 0–45)
BILIRUB SERPL-MCNC: 0.7 MG/DL (ref 0.2–1.3)
BUN SERPL-MCNC: 10 MG/DL (ref 7–30)
CALCIUM SERPL-MCNC: 9.3 MG/DL (ref 8.5–10.1)
CHLORIDE SERPL-SCNC: 103 MMOL/L (ref 94–109)
CHOLEST SERPL-MCNC: 166 MG/DL
CO2 SERPL-SCNC: 23 MMOL/L (ref 20–32)
CREAT SERPL-MCNC: 0.7 MG/DL (ref 0.66–1.25)
CREAT UR-MCNC: 73 MG/DL
GFR SERPL CREATININE-BSD FRML MDRD: >90 ML/MIN/1.7M2
GLUCOSE SERPL-MCNC: 188 MG/DL (ref 70–99)
HDLC SERPL-MCNC: 45 MG/DL
LDLC SERPL CALC-MCNC: 104 MG/DL
MICROALBUMIN UR-MCNC: 8 MG/L
MICROALBUMIN/CREAT UR: 10.96 MG/G CR (ref 0–17)
NONHDLC SERPL-MCNC: 121 MG/DL
POTASSIUM SERPL-SCNC: 3.8 MMOL/L (ref 3.4–5.3)
PROT SERPL-MCNC: 7.6 G/DL (ref 6.8–8.8)
PSA SERPL-ACNC: 1.17 UG/L (ref 0–4)
SODIUM SERPL-SCNC: 136 MMOL/L (ref 133–144)
TRIGL SERPL-MCNC: 84 MG/DL
TSH SERPL DL<=0.005 MIU/L-ACNC: 1.9 MU/L (ref 0.4–4)

## 2018-06-28 RX ORDER — GLIMEPIRIDE 1 MG/1
1 TABLET ORAL DAILY
Qty: 90 TABLET | Refills: 1 | Status: SHIPPED | OUTPATIENT
Start: 2018-06-28 | End: 2019-01-15

## 2018-06-28 RX ORDER — METFORMIN HCL 500 MG
1000 TABLET, EXTENDED RELEASE 24 HR ORAL 2 TIMES DAILY WITH MEALS
Qty: 360 TABLET | Refills: 1 | Status: SHIPPED | OUTPATIENT
Start: 2018-06-28 | End: 2018-12-28

## 2018-06-28 RX ORDER — LOSARTAN POTASSIUM AND HYDROCHLOROTHIAZIDE 12.5; 5 MG/1; MG/1
1 TABLET ORAL DAILY
Qty: 90 TABLET | Refills: 1 | Status: SHIPPED | OUTPATIENT
Start: 2018-06-28 | End: 2018-12-16

## 2018-06-28 RX ORDER — SIMVASTATIN 20 MG
20 TABLET ORAL DAILY
Qty: 90 TABLET | Refills: 3 | Status: SHIPPED | OUTPATIENT
Start: 2018-06-28 | End: 2019-02-07

## 2018-07-09 ENCOUNTER — TELEPHONE (OUTPATIENT)
Dept: INTERNAL MEDICINE | Facility: CLINIC | Age: 48
End: 2018-07-09

## 2018-07-09 NOTE — TELEPHONE ENCOUNTER
Panel Management Review      Patient has the following on his problem list:     Diabetes    ASA: Failed    Last A1C  Lab Results   Component Value Date    A1C 8.5 06/27/2018    A1C 7.2 06/28/2017    A1C 6.9 11/08/2016    A1C 6.0 08/25/2015    A1C 7.6 01/22/2014     A1C tested: MONITOR    Last LDL:    Lab Results   Component Value Date    CHOL 166 06/27/2018     Lab Results   Component Value Date    HDL 45 06/27/2018     Lab Results   Component Value Date     06/27/2018     Lab Results   Component Value Date    TRIG 84 06/27/2018     Lab Results   Component Value Date    CHOLHDLRATIO 3.2 08/25/2015     Lab Results   Component Value Date    NHDL 121 06/27/2018       Is the patient on a Statin? YES             Is the patient on Aspirin? NO    Medications     HMG CoA Reductase Inhibitors    simvastatin (ZOCOR) 20 MG tablet          Last three blood pressure readings:  BP Readings from Last 3 Encounters:   06/27/18 124/82   08/25/17 110/76   04/26/17 136/88       Date of last diabetes office visit: 6/2018     Tobacco History:     History   Smoking Status     Former Smoker     Types: Cigars, cigarillos or filtered cigars   Smokeless Tobacco     Never Used     Comment: occ. cigar           IVD   ASA: MONITOR    Last LDL:    Lab Results   Component Value Date    CHOL 166 06/27/2018     Lab Results   Component Value Date    HDL 45 06/27/2018     Lab Results   Component Value Date     06/27/2018     Lab Results   Component Value Date    TRIG 84 06/27/2018        Lab Results   Component Value Date    CHOLHDLRATIO 3.2 08/25/2015        Is the patient on a Statin? YES   Is the patient on Aspirin? NO                  Medications     HMG CoA Reductase Inhibitors    simvastatin (ZOCOR) 20 MG tablet          Last three blood pressure readings:  BP Readings from Last 3 Encounters:   06/27/18 124/82   08/25/17 110/76   04/26/17 136/88        Tobacco History:     History   Smoking Status     Former Smoker     Types:  Cigars, cigarillos or filtered cigars   Smokeless Tobacco     Never Used     Comment: occ. cigar       Hypertension   Last three blood pressure readings:  BP Readings from Last 3 Encounters:   06/27/18 124/82   08/25/17 110/76   04/26/17 136/88     Blood pressure: MONITOR    HTN Guidelines:  Age 18-59 BP range:  Less than 140/90  Age 60-85 with Diabetes:  Less than 140/90  Age 60-85 without Diabetes:  less than 150/90      Composite cancer screening  Chart review shows that this patient is due/due soon for the following Colonoscopy  Summary:    Patient is due/failing the following:   COLONOSCOPY    Action needed:   Patient needs office visit for colonoscopy.    Type of outreach:Discussed at office visit. No further action needed.Discussed at office visit. No further action needed.    Questions for provider review:    None                                                                                                                                    Jayne Smith CMA       Chart routed to N/A .

## 2018-07-12 ENCOUNTER — OFFICE VISIT (OUTPATIENT)
Dept: SURGERY | Facility: CLINIC | Age: 48
End: 2018-07-12
Payer: COMMERCIAL

## 2018-07-12 ENCOUNTER — TELEPHONE (OUTPATIENT)
Dept: SURGERY | Facility: CLINIC | Age: 48
End: 2018-07-12

## 2018-07-12 VITALS
HEART RATE: 81 BPM | OXYGEN SATURATION: 97 % | SYSTOLIC BLOOD PRESSURE: 138 MMHG | HEIGHT: 67 IN | DIASTOLIC BLOOD PRESSURE: 82 MMHG | BODY MASS INDEX: 45.04 KG/M2 | RESPIRATION RATE: 16 BRPM | WEIGHT: 287 LBS

## 2018-07-12 DIAGNOSIS — K43.9 EPIGASTRIC HERNIA: Primary | ICD-10-CM

## 2018-07-12 PROCEDURE — 99244 OFF/OP CNSLTJ NEW/EST MOD 40: CPT | Performed by: SURGERY

## 2018-07-12 ASSESSMENT — ENCOUNTER SYMPTOMS: ABDOMINAL PAIN: 1

## 2018-07-12 NOTE — PROGRESS NOTES
HPI      ROS (Review of Systems):     Cardiovascular: Positive for hypertension and hyperlipidemia.   GASTROINTESTINAL: Positive for abdominal pain.   MUSCULOSKELETAL: Positive for back pain.          Physical Exam

## 2018-07-12 NOTE — PROGRESS NOTES
Assessment:    Montez Valerio is a 48 year old male is seen in consultation for a hernia, at the request of Edna Younger MD.    Primary, reducible epigastric hernia.    Plan:    We have discussed observation, reduction techniques and importance, incarceration and strangulation signs, symptoms and importance as well as need to seek emergency treatment.      We have discussed open epigastric hernia repair with mesh in detail, including benefits, alternatives, complications, incision, scar, mesh, infection, anesthesia, bleeding, blood transfusion, DVT, PE, hernia recurrence, lifting and activity limits after surgery.  All questions have been answered to the best of my ability.    He has been given literature to review.   We will schedule surgery at the patient's convenience.    Recommended time off work postop:  1-2 wks  Recommended time off lifting 20 lb:   6 wks    Comorbidities: Obstructive sleep apnea, diabetes mellitus, PONV, HTN, GERD    HPI:  Montez Valerio is a 48 year old male who presents for evaluation of a lump in the pato-umbilical region.      Duration:  1 year .  Increasing x 6 months and extending to the right.  Pain/quality:  Yes / mild discomfort  Inciting event:  No.    Exacerbating factors:  No.  Worse at end of the day.    Nausea/vomitting/bloating:  No    Previous surgery in this location:  Yes - laparoscopic cholecystectomy 4 ya by [facility]     Previous herniorrhaphy:  No     Constipation: no  Cough: Yes, occ  Diabetes: Yes  Current Smoker: No  Wt gain:  no  Heavy lifting > 20 lb: Yes -  At home.  Light lifting at work, < 20 lb.    Past Medical History:   has a past medical history of Arthritis; Diabetes mellitus (H); Dyspnea on exertion; Gastro-oesophageal reflux disease; Hypertension; Other chronic pain; PONV (postoperative nausea and vomiting); Sleep apnea; and SOB (shortness of breath) (4/26/2017).    Past Surgical History:  Past Surgical History:   Procedure Laterality Date      "ARTHROSCOPY KNEE  2/12/2014    Procedure: ARTHROSCOPY KNEE;  Right Knee Arthroscopy, Removal of Loose Body, Partial Lateral Meniscectomy  ;  Surgeon: Amado Rondon MD;  Location: [facility] OR     AXILLARY SURGERY      Mass removed left     HC KNEE SCOPE, DIAGNOSTIC  1984    Arthroscopy, Knee right     LAPAROSCOPIC CHOLECYSTECTOMY  12/21/2012    Procedure: LAPAROSCOPIC CHOLECYSTECTOMY;  LAPAROSCOPIC CHOLECYSTECTOMY;  Surgeon: Alton Riggs MD;  Location: [facility] OR     wisdom teeth[          Social History:  Social History     Social History     Marital status: Single     Spouse name: N/A     Number of children: N/A     Years of education: N/A     Occupational History     Not on file.     Social History Main Topics     Smoking status: Former Smoker     Types: Cigars, cigarillos or filtered cigars     Smokeless tobacco: Never Used      Comment: occ. cigar     Alcohol use Yes      Comment: 4-6 drinks, 1-2 nights week, beer/liquor     Drug use: No     Sexual activity: Not Currently     Other Topics Concern     Parent/Sibling W/ Cabg, Mi Or Angioplasty Before 65f 55m? No     Social History Narrative        Family History:  Family History   Problem Relation Age of Onset     Lipids Mother      Cerebrovascular Disease Mother      Lipids Father      Gallbladder Disease Father      Cerebrovascular Disease Father      C.A.D. Maternal Grandfather      MI     C.A.D. Paternal Grandfather      MI     Cancer Paternal Grandmother      Pancreatic     Alcohol/Drug Sister      Psychotic Disorder Sister      Mental Illness Sister      Gallbladder Disease Sister      Family history reviewed and not pertinent.    ROS:  The 10 point review of systems is negative other than noted in the HPI and above.    PE:    Vitals: /82 (Cuff Size: Adult Regular)  Pulse 81  Resp 16  Ht 5' 7\" (1.702 m)  Wt 287 lb (130.2 kg)  SpO2 97%  BMI 44.95 kg/m2  BMI= Body mass index is 44.95 kg/(m^2).  General - Well developed, well nourished male in no " apparent distress  HEENT:  Head normocephalic and atraumatic, pupils equal and round, conjunctivae clear, no scleral icterus, mucous membranes moist, external ears and nose normal  Lungs: Respirations unlabored  Abdomen:  abdomen is soft without significant tenderness, masses, organomegaly or guarding   Hernia:  epigastric, immediately cephalad into the right of the umbilicus, 2 cm, reducible, mildly tender  Extremities: Warm without edema  Musculoskeletal:  Normal station and gait  Neurologic: alert, speech is clear, moves all extremities with good strength  Psychiatric: Mood and affect appropriate  Skin: Without lesions or rashes, or juandice    This note was created using voice recognition software. Undetected word substitutions or other errors may have occurred.     Time spent with the patient with greater that 50% of the time in discussion was 20 minutes.     Chastity Rios MD    Please route or send letter to:  Primary Care Provider (PCP) and Referring Provider

## 2018-07-12 NOTE — LETTER
2018    RE: Montez Valerio, : 1970      Assessment:    Montez Valerio is a 48 year old male is seen in consultation for a hernia, at the request of Edna Younger MD.     Primary, reducible epigastric hernia.     Plan:    We have discussed observation, reduction techniques and importance, incarceration and strangulation signs, symptoms and importance as well as need to seek emergency treatment.       We have discussed open epigastric hernia repair with mesh in detail, including benefits, alternatives, complications, incision, scar, mesh, infection, anesthesia, bleeding, blood transfusion, DVT, PE, hernia recurrence, lifting and activity limits after surgery.  All questions have been answered to the best of my ability.     He has been given literature to review.   We will schedule surgery at the patient's convenience.     Recommended time off work postop:  1-2 wks  Recommended time off lifting 20 lb:   6 wks     Comorbidities: Obstructive sleep apnea, diabetes mellitus, PONV, HTN, GERD     HPI:  Montez Valerio is a 48 year old male who presents for evaluation of a lump in the pato-umbilical region.       Duration:  1 year .  Increasing x 6 months and extending to the right.  Pain/quality:  Yes / mild discomfort  Inciting event:  No.    Exacerbating factors:  No.  Worse at end of the day.    Nausea/vomitting/bloating:  No    Previous surgery in this location:  Yes - laparoscopic cholecystectomy 4 ya by [facility]     Previous herniorrhaphy:  No      Constipation: no  Cough: Yes, occ  Diabetes: Yes  Current Smoker: No  Wt gain:  no  Heavy lifting > 20 lb: Yes -  At home.  Light lifting at work, < 20 lb.     Past Medical History:  Has a past medical history of Arthritis; Diabetes mellitus (H); Dyspnea on exertion; Gastro-oesophageal reflux disease; Hypertension; Other chronic pain; PONV (postoperative nausea and vomiting); Sleep apnea; and SOB (shortness of breath) (2017).     History reviewed and  "not pertinent.     ROS:  The 10 point review of systems is negative other than noted in the HPI and above.     PE:    Vitals: /82 (Cuff Size: Adult Regular)  Pulse 81  Resp 16  Ht 5' 7\" (1.702 m)  Wt 287 lb (130.2 kg)  SpO2 97%  BMI 44.95 kg/m2  BMI= Body mass index is 44.95 kg/(m^2).  General - Well developed, well nourished male in no apparent distress  HEENT:  Head normocephalic and atraumatic, pupils equal and round, conjunctivae clear, no scleral icterus, mucous membranes moist, external ears and nose normal  Lungs: Respirations unlabored  Abdomen:  abdomen is soft without significant tenderness, masses, organomegaly or guarding                        Hernia:  epigastric, immediately cephalad into the right of the umbilicus, 2 cm, reducible, mildly tender  Extremities: Warm without edema  Musculoskeletal:  Normal station and gait  Neurologic: alert, speech is clear, moves all extremities with good strength  Psychiatric: Mood and affect appropriate  Skin: Without lesions or rashes, or may Rios MD       "

## 2018-07-12 NOTE — TELEPHONE ENCOUNTER
Type of surgery: OPEN EPIGASTRIC HERNIA REPAIR WITH MESH  Location of surgery: [facility]  Date and time of surgery: 7/18/2018 @ 9:00 AM   Surgeon: LUKAS RON MD    Pre-Op Appt Date: PATIENT TO SCHEDULE    Post-Op Appt Date: PATIENT TO SCHEDULE     Packet sent out: PACKET AND SOAP GIVEN TO PATIENT   Pre-cert/Authorization completed:  Not Applicable  Date: 7/12/2018    OPEN EPIGASTRIC HERNIA REPAIR WITH MESH   GENERAL PT INST TO HAVE H&P WITH DR KHANNA  60 MIN REQ PA ASSIST EGG NMS

## 2018-07-12 NOTE — MR AVS SNAPSHOT
"              After Visit Summary   7/12/2018    Montez Valerio    MRN: [id number]           Patient Information     Date Of Birth          1970        Visit Information        Provider Department      7/12/2018 9:45 AM Chastity Rios MD [facility] Hern[facility]      Today's Diagnoses     Epigastric hernia    -  1       Follow-ups after your visit        Who to contact     If you have questions or need follow up information about today's clinic visit or your schedule please contact [facility] directly at [phone].  Normal or non-critical lab and imaging results will be communicated to you by [employer], letter or phone within 4 business days after the clinic has received the results. If you do not hear from us within 7 days, please contact the clinic through [employer] or phone. If you have a critical or abnormal lab result, we will notify you by phone as soon as possible.  Submit refill requests through [employer] or call your pharmacy and they will forward the refill request to us. Please allow 3 business days for your refill to be completed.          Additional Information About Your Visit        MyChart Information     [employer] gives you secure access to your electronic health record. If you see a primary care provider, you can also send messages to your care team and make appointments. If you have questions, please call your primary care clinic.  If you do not have a primary care provider, please call [phone] and they will assist you.        Care EveryWhere ID     This is your Care EveryWhere ID. This could be used by other organizations to access your [place] medical records  [id number]        Your Vitals Were     Pulse Respirations Height Pulse Oximetry BMI (Body Mass Index)       81 16 5' 7\" (1.702 m) 97% 44.95 kg/m2        Blood Pressure from Last 3 Encounters:   07/12/18 138/82   06/27/18 124/82   08/25/17 110/76    " Weight from Last 3 Encounters:   07/12/18 287 lb (130.2 kg)   06/27/18 287 lb 6.4 oz (130.4 kg)   08/25/17 286 lb 8 oz (130 kg)              Today, you had the following     No orders found for display       Primary Care Provider Office Phone # Fax #    Edna TAYLOR MD [facility] [phone]       [address]  [facility] [address]        Equal Access to Services     AVE GONZALES : Hadii aad ku hadasho Soomaali, waaxda luqadaha, qaybta kaalmada adeegyada, waxay idiin hayaan adeeg kharash la'beverleyn . So [facility] [phone].    ATENCIÓN: Si habla español, tiene a man disposición servicios gratuitos de asistencia lingüística. [facility] [phone].    We comply with applicable federal civil rights laws and [place] laws. We do not discriminate on the basis of race, color, national origin, age, disability, sex, sexual orientation, or gender identity.            Thank you!     Thank you for choosing [facility]  for your care. Our goal is always to provide you with excellent care. Hearing back from our patients is one way we can continue to improve our services. Please take a few minutes to complete the written survey that you may receive in the mail after your visit with us. Thank you!             Your Updated Medication List - Protect others around you: Learn how to safely use, store and throw away your medicines at www.disposemymeds.org.          This list is accurate as of 7/12/18 10:13 AM.  Always use your most recent med list.                   Brand Name Dispense Instructions for use Diagnosis    albuterol 108 (90 Base) MCG/ACT Inhaler    PROAIR HFA/PROVENTIL HFA/VENTOLIN HFA    1 Inhaler    Inhale 2 puffs into the lungs every 6 hours    SOB (shortness of breath)       blood glucose monitoring lancets     1 Box    Use to test blood sugars ONE times daily or as directed.    Type 2 diabetes mellitus without complication (H)       blood glucose monitoring meter device kit     1  kit    Use to test blood sugars ONE times daily or as directed.    Type 2 diabetes mellitus without complication (H)       * blood glucose monitoring test strip    ACCU-CHEK HOWARD PLUS    100 each    by In Vitro route daily    Type 2 diabetes mellitus without complication, without long-term current use of insulin (H)       * blood glucose monitoring test strip    ONETOUCH VERIO IQ    100 each    Use to test blood sugars ONE times daily or as directed.    Type 2 diabetes mellitus without complication (H)       cholecalciferol 1000 units capsule    vitamin  -D     Take 1 capsule by mouth daily        glimepiride 1 MG tablet    AMARYL    90 tablet    Take 1 tablet (1 mg) by mouth daily    Type 2 diabetes mellitus without complication, without long-term current use of insulin (H)       ibuprofen 200 MG tablet    ADVIL/MOTRIN     Take 600 mg by mouth 2 times daily        losartan-hydrochlorothiazide 50-12.5 MG per tablet    HYZAAR    90 tablet    Take 1 tablet by mouth daily    Essential hypertension with goal blood pressure less than 140/90       metFORMIN 500 MG 24 hr tablet    GLUCOPHAGE-XR    360 tablet    Take 2 tablets (1,000 mg) by mouth 2 times daily (with meals)    Type 2 diabetes mellitus without complication, without long-term current use of insulin (H)       simvastatin 20 MG tablet    ZOCOR    90 tablet    Take 1 tablet (20 mg) by mouth daily at bedtime.    Hyperlipidemia LDL goal <100       * Notice:  This list has 2 medication(s) that are the same as other medications prescribed for you. Read the directions carefully, and ask your doctor or other care provider to review them with you.

## 2018-07-13 ENCOUNTER — TRANSFERRED RECORDS (OUTPATIENT)
Dept: HEALTH INFORMATION MANAGEMENT | Facility: CLINIC | Age: 48
End: 2018-07-13

## 2018-07-16 ENCOUNTER — TELEPHONE (OUTPATIENT)
Dept: INTERNAL MEDICINE | Facility: CLINIC | Age: 48
End: 2018-07-16

## 2018-07-16 NOTE — TELEPHONE ENCOUNTER
Patient called. Stated he is having surgery on 7/18 (hernia repair), and he saw Aren on 6/27/18 for a routine physical. Patient is wondering if this is good enough for a pre op? Patient stated he called surgeon's office and was told to call his PCP, as its up to them.

## 2018-07-16 NOTE — TELEPHONE ENCOUNTER
Pt needs preop, EKG and also needs preop instructions and also may need rpt A1c as it was high before

## 2018-07-17 ENCOUNTER — OFFICE VISIT (OUTPATIENT)
Dept: INTERNAL MEDICINE | Facility: CLINIC | Age: 48
End: 2018-07-17
Payer: COMMERCIAL

## 2018-07-17 ENCOUNTER — TELEPHONE (OUTPATIENT)
Dept: SURGERY | Facility: CLINIC | Age: 48
End: 2018-07-17

## 2018-07-17 VITALS
TEMPERATURE: 98.1 F | HEART RATE: 101 BPM | RESPIRATION RATE: 15 BRPM | OXYGEN SATURATION: 97 % | WEIGHT: 285.9 LBS | BODY MASS INDEX: 44.78 KG/M2 | DIASTOLIC BLOOD PRESSURE: 80 MMHG | SYSTOLIC BLOOD PRESSURE: 116 MMHG

## 2018-07-17 DIAGNOSIS — K43.9 EPIGASTRIC HERNIA: ICD-10-CM

## 2018-07-17 DIAGNOSIS — Z01.818 PREOP GENERAL PHYSICAL EXAM: Primary | ICD-10-CM

## 2018-07-17 DIAGNOSIS — E11.9 TYPE 2 DIABETES MELLITUS WITHOUT COMPLICATION, WITHOUT LONG-TERM CURRENT USE OF INSULIN (H): ICD-10-CM

## 2018-07-17 DIAGNOSIS — I10 ESSENTIAL HYPERTENSION, BENIGN: ICD-10-CM

## 2018-07-17 LAB — HBA1C MFR BLD: 8.5 % (ref 0–5.6)

## 2018-07-17 PROCEDURE — 83036 HEMOGLOBIN GLYCOSYLATED A1C: CPT | Performed by: INTERNAL MEDICINE

## 2018-07-17 PROCEDURE — 93000 ELECTROCARDIOGRAM COMPLETE: CPT | Performed by: INTERNAL MEDICINE

## 2018-07-17 PROCEDURE — 99215 OFFICE O/P EST HI 40 MIN: CPT | Performed by: INTERNAL MEDICINE

## 2018-07-17 PROCEDURE — 36415 COLL VENOUS BLD VENIPUNCTURE: CPT | Performed by: INTERNAL MEDICINE

## 2018-07-17 NOTE — TELEPHONE ENCOUNTER
Montez left a  Message  7/17/2018 @ 2:37 pm  That he saw his PCP today and his A1C was 8.5 - Montez was advised to call and let Dr Rios know.... Dr Rios was called she said she would still do the surgery as long as the patient knows and is ok with the risk of reoccurrence goes up form 10 % to 15 % due to his diabetes not being under control. A message was left on Montez's voicemail with the information 7/17/2018 @ 3:02 pm Marleny Cerna CMA

## 2018-07-17 NOTE — NURSING NOTE
/80  Pulse 101  Temp 98.1  F (36.7  C) (Oral)  Resp 15  Wt 285 lb 14.4 oz (129.7 kg)  SpO2 97%  BMI 44.78 kg/m2  Patient in for Pre-Op.  Jayne Smith CMA

## 2018-07-17 NOTE — PROGRESS NOTES
[place] [address]  [facility] [address]  [phone]  Dept: [phone]    PRE-OP EVALUATION:  Today's date: 2018    Montez Valerio (: 1970) presents for pre-operative evaluation assessment as requested by Dr. Rios.  He requires evaluation and anesthesia risk assessment prior to undergoing surgery/procedure for treatment of Epigastric hernia.    Fax number for surgical facility: [facility]  Primary Physician: Edna Younger  Type of Anesthesia Anticipated: General    Patient has a Health Care Directive or Living Will:  NO    Preop Questions 2018   Who is doing your surgery? Chastity Rios   What are you having done? HERNIORRHAPHY EPIGASTRIC   Date of Surgery/Procedure: 2018   Facility or Hospital where procedure/surgery will be performed: [facility]   1.  Do you have a history of Heart attack, stroke, stent, coronary bypass surgery, or other heart surgery? No   2.  Do you ever have any pain or discomfort in your chest? YES - in the past    3.  Do you have a history of  Heart Failure? No   4.   Are you troubled by shortness of breath when:  walking on a level surface, or up a slight hill, or at night? occ   5.  Do you currently have a cold, bronchitis or other respiratory infection? No   6.  Do you have a cough, shortness of breath, or wheezing? No   7.  Do you sometimes get pains in the calves of your legs when you walk? YES - occ   8. Do you or anyone in your family have previous history of blood clots? No   9.  Do you or does anyone in your family have a serious bleeding problem such as prolonged bleeding following surgeries or cuts? No   10. Have you ever had problems with anemia or been told to take iron pills? No   11. Have you had any abnormal blood loss such as black, tarry or bloody stools? No   12. Have you ever had a blood transfusion? No   13. Have you or any of your relatives ever had problems with anesthesia? YES -nausea     14. Do you have sleep apnea, excessive snoring or daytime drowsiness? YES - sleep apnea    15. Do you have any prosthetic heart valves? No   16. Do you have prosthetic joints? No         HPI:     DIABETES - Patient has a longstanding history of DiabetesType Type II . Patient is being treated with oral agents and denies significant side effects. Control has been fair. Complicating factors include but are not limited to: hypertension and hyperlipidemia.                                                                                                                            .  HYPERLIPIDEMIA - Patient has a long history of significant Hyperlipidemia requiring medication for treatment with recent good control. Patient reports no problems or side effects with the medication.                                                                                                                                                       .  HYPERTENSION - Patient has longstanding history of HTN , currently denies any symptoms referable to elevated blood pressure. Specifically denies chest pain, palpitations, dyspnea, orthopnea, PND or peripheral edema. Blood pressure readings have been in normal range. Current medication regimen is as listed below. Patient denies any side effects of medication.                                                                                                                                                                                          .    MEDICAL HISTORY:     Patient Active Problem List    Diagnosis Date Noted     SOB (shortness of breath) 04/26/2017     Priority: Medium     Chest pressure 04/26/2017     Priority: Medium     Essential hypertension, benign 11/08/2016     Priority: Medium     Morbid obesity (H) 10/25/2015     Priority: Medium     Type 2 diabetes mellitus without complication (H) 10/31/2010     Priority: Medium     HYPERLIPIDEMIA LDL GOAL <100 10/31/2010     Priority: Medium      Mixed hyperlipidemia 09/27/2007     Priority: Medium     iamdcLUMB/LUMBOSAC DISC DEGEN 01/03/2006     Priority: Medium     Esophageal reflux 04/30/2004     Priority: Medium      Past Medical History:   Diagnosis Date     Arthritis     Neck     Diabetes mellitus (H)     type 2     Dyspnea on exertion      Gastro-oesophageal reflux disease      Hypertension      Other chronic pain     neck pain     PONV (postoperative nausea and vomiting)      Sleep apnea     CPAP     SOB (shortness of breath) 4/26/2017     Past Surgical History:   Procedure Laterality Date     ARTHROSCOPY KNEE  2/12/2014    Procedure: ARTHROSCOPY KNEE;  Right Knee Arthroscopy, Removal of Loose Body, Partial Lateral Meniscectomy  ;  Surgeon: Amado Rondon MD;  Location: [facility] OR     AXILLARY SURGERY      Mass removed left     HC KNEE SCOPE, DIAGNOSTIC  1984    Arthroscopy, Knee right     LAPAROSCOPIC CHOLECYSTECTOMY  12/21/2012    Procedure: LAPAROSCOPIC CHOLECYSTECTOMY;  LAPAROSCOPIC CHOLECYSTECTOMY;  Surgeon: Alton Riggs MD;  Location: [facility] OR     wisdom teeth[       Current Outpatient Prescriptions   Medication Sig Dispense Refill     albuterol (PROAIR HFA/PROVENTIL HFA/VENTOLIN HFA) 108 (90 BASE) MCG/ACT Inhaler Inhale 2 puffs into the lungs every 6 hours 1 Inhaler 3     blood glucose monitoring (ACCU-CHEK HOWARD PLUS) test strip by In Vitro route daily 100 each 1     blood glucose monitoring (ONE TOUCH DELICA) lancets Use to test blood sugars ONE times daily or as directed. 1 Box 3     blood glucose monitoring (ONE TOUCH VERIO IQ) test strip Use to test blood sugars ONE times daily or as directed. 100 each 3     blood glucose monitoring (ONETOUCH VERIO) meter device kit Use to test blood sugars ONE times daily or as directed. 1 kit 0     cholecalciferol (VITAMIN  -D) 1000 UNITS capsule Take 1 capsule by mouth daily       glimepiride (AMARYL) 1 MG tablet Take 1 tablet (1 mg) by mouth daily 90 tablet 1     ibuprofen (ADVIL/MOTRIN) 200 MG  tablet Take 600 mg by mouth 2 times daily       losartan-hydrochlorothiazide (HYZAAR) 50-12.5 MG per tablet Take 1 tablet by mouth daily 90 tablet 1     metFORMIN (GLUCOPHAGE-XR) 500 MG 24 hr tablet Take 2 tablets (1,000 mg) by mouth 2 times daily (with meals) 360 tablet 1     simvastatin (ZOCOR) 20 MG tablet Take 1 tablet (20 mg) by mouth daily at bedtime. 90 tablet 3     OTC products: None, except as noted above    Allergies   Allergen Reactions     No Known Drug Allergies       Latex Allergy: NO    Social History   Substance Use Topics     Smoking status: Former Smoker     Types: Cigars, cigarillos or filtered cigars     Smokeless tobacco: Never Used      Comment: occ. cigar     Alcohol use Yes      Comment: 4-6 drinks, 1-2 nights week, beer/liquor     History   Drug Use No       REVIEW OF SYSTEMS:   CONSTITUTIONAL: NEGATIVE for fever, chills, change in weight  INTEGUMENTARY/SKIN: NEGATIVE for worrisome rashes, moles or lesions  EYES: NEGATIVE for vision changes or irritation  ENT/MOUTH: NEGATIVE for ear, mouth and throat problems  RESP: NEGATIVE for significant cough or SOB  BREAST: NEGATIVE for masses, tenderness or discharge  CV: NEGATIVE for chest pain, palpitations or peripheral edema  GI: NEGATIVE for nausea, abdominal pain, heartburn, or change in bowel habits  : NEGATIVE for frequency, dysuria, or hematuria  MUSCULOSKELETAL: NEGATIVE for significant arthralgias or myalgia  NEURO: NEGATIVE for weakness, dizziness or paresthesias  ENDOCRINE: NEGATIVE for temperature intolerance, skin/hair changes  HEME: NEGATIVE for bleeding problems  PSYCHIATRIC: NEGATIVE for changes in mood or affect    EXAM:   /80  Pulse 101  Temp 98.1  F (36.7  C) (Oral)  Resp 15  Wt 285 lb 14.4 oz (129.7 kg)  SpO2 97%  BMI 44.78 kg/m2    GENERAL APPEARANCE: healthy, alert and no distress     EYES: EOMI,  PERRL     HENT: ear canals and TM's normal and nose and mouth without ulcers or lesions     NECK: no adenopathy, no  asymmetry, masses, or scars and thyroid normal to palpation     RESP: lungs clear to auscultation - no rales, rhonchi or wheezes     CV: regular rates and rhythm, normal S1 S2, no S3 or S4 and no murmur, click or rub     ABDOMEN:  soft, nontender, epigastric hernia noted. no HSM   and bowel sounds normal     MS: extremities normal- no gross deformities noted, no evidence of inflammation in joints, FROM in all extremities.     NEURO: Normal strength and tone, sensory exam grossly normal, mentation intact and speech normal     PSYCH: mentation appears normal. and affect normal/bright     LYMPHATICS: No cervical adenopathy    DIAGNOSTICS:   EKG: sinus rhythm ,  no acute ST/T changes , no LVH by voltage criteria, unchanged from previous tracings  Hemoglobin 14.8  Serum Potassium  3.8  Serum Creatinine 0.70   Hemoglobin A1C  Pending     Recent Labs   Lab Test  06/27/18   1020  06/28/17   1010  11/08/16   0918  08/25/15   1006  01/22/14   1119   HGB  14.8   --   14.0  15.1  14.8   PLT   --    --    --   258  263   NA  136  139  139  139  138   POTASSIUM  3.8  4.7  3.9  4.2  3.9   CR  0.70  0.76  0.81  0.74  0.78   A1C  8.5*  7.2*  6.9*  6.0  7.6*        IMPRESSION:        (Z01.818) Preop general physical exam  (primary encounter diagnosis)  Comment: HERNIORRHAPHY EPIGASTRIC  Plan: Hemoglobin A1c         (E11.9) Type 2 diabetes mellitus without complication, without long-term current use of insulin (H)  Plan: check A1c, 8.5 today  metformin was increased to 1000 mg  twice a day at last office visit. Also on amaryl.     (K43.9) Epigastric hernia  Plan: HERNIORRHAPHY EPIGASTRIC    (I10) Essential hypertension, benign  Plan: BP stable      The proposed surgical procedure is considered INTERMEDIATE risk.    REVISED CARDIAC RISK INDEX  The patient has the following serious cardiovascular risks for perioperative complications such as (MI, PE, VFib and 3  AV Block):  No serious cardiac risks    The patient has the following  additional risks for perioperative complications:  Elevated A1c      ICD-10-CM    1. Preop general physical exam Z01.818 Hemoglobin A1c   2. Type 2 diabetes mellitus without complication, without long-term current use of insulin (H) E11.9    3. Epigastric hernia K43.9    4. Essential hypertension, benign I10        RECOMMENDATIONS:     --Consult hospital rounder / IM to assist post-op medical management      Obstructive Sleep Apnea (or suspected sleep apnea)  Patient is to bring their home CPAP with them on the day of surgery        Diabetes Medication Use  -----Hold usual oral and non-insulin diabetic meds (e.g. Metformin, Actos, Glipizide) while NPO. Pt was advised to check with his surgeon as his A1c is 8.5 and  if they would be ok with a1c of 8.5 for surgery as he would be increased risk for pato/postoperative complications with high A1c. Pt was informed about this . Ideally A1c should be < 8.0 .      ACE Inhibitor or Angiotensin Receptor Blocker (ARB) Use  Ace inhibitor or Angiotensin Receptor Blocker (ARB) and should HOLD this medication for the 24 hours prior to surgery.      APPROVAL GIVEN to proceed with proposed procedure, without further diagnostic evaluation       Signed Electronically by: Edna Younger MD    Copy of this evaluation report is provided to requesting physician.    [facility] Preop Guidelines    Revised Cardiac Risk Index

## 2018-07-17 NOTE — MR AVS SNAPSHOT
After Visit Summary   7/17/2018    Montez Valerio    MRN: [id number]           Patient Information     Date Of Birth          1970        Visit Information        Provider Department      7/17/2018 10:00 AM Edna Younger MD [facility]        Today's Diagnoses     Preop general physical exam    -  1    Type 2 diabetes mellitus without complication, without long-term current use of insulin (H)        Epigastric hernia        Essential hypertension, benign          Care Instructions      Before Your Surgery      Call your surgeon if there is any change in your health. This includes signs of a cold or flu (such as a sore throat, runny nose, cough, rash or fever).    Do not smoke, drink alcohol or take over the counter medicine (unless your surgeon or primary care doctor tells you to) for the 24 hours before and after surgery.    If you take prescribed drugs: Follow your doctor s orders about which medicines to take and which to stop until after surgery.    Eating and drinking prior to surgery: follow the instructions from your surgeon    Take a shower or bath the night before surgery. Use the soap your surgeon gave you to gently clean your skin. If you do not have soap from your surgeon, use your regular soap. Do not shave or scrub the surgery site.  Wear clean pajamas and have clean sheets on your bed.           Follow-ups after your visit        Who to contact     If you have questions or need follow up information about today's clinic visit or your schedule please contact [facility] directly at [phone].  Normal or non-critical lab and imaging results will be communicated to you by MyChart, letter or phone within 4 business days after the clinic has received the results. If you do not hear from us within 7 days, please contact the clinic through MyChart or phone. If you have a critical or abnormal lab result, we will notify you by phone as soon as  possible.  Submit refill requests through DrAvailable or call your pharmacy and they will forward the refill request to us. Please allow 3 business days for your refill to be completed.          Additional Information About Your Visit        General Specifichart Information     DrAvailable gives you secure access to your electronic health record. If you see a primary care provider, you can also send messages to your care team and make appointments. If you have questions, please call your primary care clinic.  If you do not have a primary care provider, please call [phone] and they will assist you.        Care EveryWhere ID     This is your Care EveryWhere ID. This could be used by other organizations to access your [facility] medical records  [id number]        Your Vitals Were     Pulse Temperature Respirations Pulse Oximetry BMI (Body Mass Index)       101 98.1  F (36.7  C) (Oral) 15 97% 44.78 kg/m2        Blood Pressure from Last 3 Encounters:   07/18/18 119/73   07/17/18 116/80   07/12/18 138/82    Weight from Last 3 Encounters:   07/18/18 281 lb 11.2 oz (127.8 kg)   07/17/18 285 lb 14.4 oz (129.7 kg)   07/12/18 287 lb (130.2 kg)              We Performed the Following     EKG 12-lead complete w/read - Clinics     Hemoglobin A1c        Primary Care Provider Office Phone # Fax #    Edna TAYLOR MD [facility] [phone]       [address] [facility] [address]        Equal Access to Services     [facility] : Hadii aad ku hadasho Sokingali, waaxda luqadaha, qaybta kaalmada adesalinayada, tavo dias . So [facility] [phone].    ATENCIÓN: Si habla español, tiene a man disposición servicios gratuitos de asistencia lingüística. Joseph al [phone].    We comply with applicable federal civil rights laws and [place] laws. We do not discriminate on the basis of race, color, national origin, age, disability, sex, sexual orientation, or gender identity.            Thank you!     Thank you  for choosing [facility]  for your care. Our goal is always to provide you with excellent care. Hearing back from our patients is one way we can continue to improve our services. Please take a few minutes to complete the written survey that you may receive in the mail after your visit with us. Thank you!             Your Updated Medication List - Protect others around you: Learn how to safely use, store and throw away your medicines at www.disposemymeds.org.          This list is accurate as of 7/17/18 11:59 PM.  Always use your most recent med list.                   Brand Name Dispense Instructions for use Diagnosis    albuterol 108 (90 Base) MCG/ACT Inhaler    PROAIR HFA/PROVENTIL HFA/VENTOLIN HFA    1 Inhaler    Inhale 2 puffs into the lungs every 6 hours    SOB (shortness of breath)       blood glucose monitoring lancets     1 Box    Use to test blood sugars ONE times daily or as directed.    Type 2 diabetes mellitus without complication (H)       blood glucose monitoring meter device kit     1 kit    Use to test blood sugars ONE times daily or as directed.    Type 2 diabetes mellitus without complication (H)       * blood glucose monitoring test strip    ACCU-CHEK HOWARD PLUS    100 each    by In Vitro route daily    Type 2 diabetes mellitus without complication, without long-term current use of insulin (H)       * blood glucose monitoring test strip    ONETOUCH VERIO IQ    100 each    Use to test blood sugars ONE times daily or as directed.    Type 2 diabetes mellitus without complication (H)       cholecalciferol 1000 units capsule    vitamin  -D     Take 1 capsule by mouth daily        glimepiride 1 MG tablet    AMARYL    90 tablet    Take 1 tablet (1 mg) by mouth daily    Type 2 diabetes mellitus without complication, without long-term current use of insulin (H)       ibuprofen 600 MG tablet    ADVIL/MOTRIN    30 tablet    Take 1 tablet (600 mg) by mouth every 6 hours as needed for pain  (mild)    Epigastric hernia       losartan-hydrochlorothiazide 50-12.5 MG per tablet    HYZAAR    90 tablet    Take 1 tablet by mouth daily    Essential hypertension with goal blood pressure less than 140/90       metFORMIN 500 MG 24 hr tablet    GLUCOPHAGE-XR    360 tablet    Take 2 tablets (1,000 mg) by mouth 2 times daily (with meals)    Type 2 diabetes mellitus without complication, without long-term current use of insulin (H)       oxyCODONE-acetaminophen 5-325 MG per tablet    PERCOCET    15 tablet    Take 1-2 tablets by mouth every 4 hours as needed for pain (moderate to severe)    Epigastric hernia       senna-docusate 8.6-50 MG per tablet    SENOKOT-S;PERICOLACE    30 tablet    Take 1-2 tablets by mouth 2 times daily Take while on oral narcotics to prevent or treat constipation.    Epigastric hernia       simvastatin 20 MG tablet    ZOCOR    90 tablet    Take 1 tablet (20 mg) by mouth daily at bedtime.    Hyperlipidemia LDL goal <100       * Notice:  This list has 2 medication(s) that are the same as other medications prescribed for you. Read the directions carefully, and ask your doctor or other care provider to review them with you.

## 2018-07-18 ENCOUNTER — ANESTHESIA (OUTPATIENT)
Dept: SURGERY | Facility: CLINIC | Age: 48
End: 2018-07-18
Payer: COMMERCIAL

## 2018-07-18 ENCOUNTER — HOSPITAL ENCOUNTER (OUTPATIENT)
Facility: CLINIC | Age: 48
Discharge: HOME | End: 2018-07-18
Attending: SURGERY | Admitting: SURGERY
Payer: COMMERCIAL

## 2018-07-18 ENCOUNTER — ANESTHESIA EVENT (OUTPATIENT)
Dept: SURGERY | Facility: CLINIC | Age: 48
End: 2018-07-18
Payer: COMMERCIAL

## 2018-07-18 ENCOUNTER — APPOINTMENT (OUTPATIENT)
Dept: SURGERY | Facility: PHYSICIAN GROUP | Age: 48
End: 2018-07-18
Payer: COMMERCIAL

## 2018-07-18 VITALS
BODY MASS INDEX: 44.21 KG/M2 | TEMPERATURE: 98.2 F | DIASTOLIC BLOOD PRESSURE: 72 MMHG | OXYGEN SATURATION: 97 % | RESPIRATION RATE: 16 BRPM | SYSTOLIC BLOOD PRESSURE: 130 MMHG | WEIGHT: 281.7 LBS | HEIGHT: 67 IN

## 2018-07-18 DIAGNOSIS — K43.9 EPIGASTRIC HERNIA: Primary | ICD-10-CM

## 2018-07-18 LAB
GLUCOSE BLDC GLUCOMTR-MCNC: 157 MG/DL (ref 70–99)
GLUCOSE BLDC GLUCOMTR-MCNC: 168 MG/DL (ref 70–99)

## 2018-07-18 PROCEDURE — 25000128 H RX IP 250 OP 636: Performed by: ANESTHESIOLOGY

## 2018-07-18 PROCEDURE — 25000125 ZZHC RX 250: Performed by: NURSE ANESTHETIST, CERTIFIED REGISTERED

## 2018-07-18 PROCEDURE — 71000027 ZZH RECOVERY PHASE 2 EACH 15 MINS: Performed by: SURGERY

## 2018-07-18 PROCEDURE — 37000009 ZZH ANESTHESIA TECHNICAL FEE, EACH ADDTL 15 MIN: Performed by: SURGERY

## 2018-07-18 PROCEDURE — 37000008 ZZH ANESTHESIA TECHNICAL FEE, 1ST 30 MIN: Performed by: SURGERY

## 2018-07-18 PROCEDURE — 27210794 ZZH OR GENERAL SUPPLY STERILE: Performed by: SURGERY

## 2018-07-18 PROCEDURE — 25000566 ZZH SEVOFLURANE, EA 15 MIN: Performed by: SURGERY

## 2018-07-18 PROCEDURE — 27210995 ZZH RX 272: Performed by: SURGERY

## 2018-07-18 PROCEDURE — 25000128 H RX IP 250 OP 636: Performed by: SURGERY

## 2018-07-18 PROCEDURE — 40000306 ZZH STATISTIC PRE PROC ASSESS II: Performed by: SURGERY

## 2018-07-18 PROCEDURE — 49570 ZZHC REPAIR EPIGASTRIC HERNIA,REDUC: CPT | Performed by: SURGERY

## 2018-07-18 PROCEDURE — 49570 ZZHC REPAIR EPIGASTRIC HERNIA,REDUC: CPT | Mod: AS | Performed by: PHYSICIAN ASSISTANT

## 2018-07-18 PROCEDURE — 25000128 H RX IP 250 OP 636: Performed by: NURSE ANESTHETIST, CERTIFIED REGISTERED

## 2018-07-18 PROCEDURE — 82962 GLUCOSE BLOOD TEST: CPT

## 2018-07-18 PROCEDURE — 36000093 ZZH SURGERY LEVEL 4 1ST 30 MIN: Performed by: SURGERY

## 2018-07-18 PROCEDURE — 25000132 ZZH RX MED GY IP 250 OP 250 PS 637: Performed by: SURGERY

## 2018-07-18 PROCEDURE — 71000012 ZZH RECOVERY PHASE 1 LEVEL 1 FIRST HR: Performed by: SURGERY

## 2018-07-18 PROCEDURE — 25000125 ZZHC RX 250: Performed by: SURGERY

## 2018-07-18 PROCEDURE — C1781 MESH (IMPLANTABLE): HCPCS | Performed by: SURGERY

## 2018-07-18 PROCEDURE — 36000063 ZZH SURGERY LEVEL 4 EA 15 ADDTL MIN: Performed by: SURGERY

## 2018-07-18 PROCEDURE — 27211024 ZZHC OR SUPPLY OTHER OPNP: Performed by: SURGERY

## 2018-07-18 DEVICE — MESH VENTRALEX HERNIA 2.5" CIRCLE MED W/STRAP 5950008: Type: IMPLANTABLE DEVICE | Site: ABDOMEN | Status: FUNCTIONAL

## 2018-07-18 RX ORDER — ALBUTEROL SULFATE 0.83 MG/ML
2.5 SOLUTION RESPIRATORY (INHALATION) EVERY 4 HOURS PRN
Status: DISCONTINUED | OUTPATIENT
Start: 2018-07-18 | End: 2018-07-18 | Stop reason: HOSPADM

## 2018-07-18 RX ORDER — IBUPROFEN 600 MG/1
600 TABLET, FILM COATED ORAL
Status: DISCONTINUED | OUTPATIENT
Start: 2018-07-18 | End: 2018-07-18 | Stop reason: HOSPADM

## 2018-07-18 RX ORDER — AMOXICILLIN 250 MG
1-2 CAPSULE ORAL 2 TIMES DAILY
Qty: 30 TABLET | Refills: 0 | Status: SHIPPED | OUTPATIENT
Start: 2018-07-18 | End: 2019-02-06

## 2018-07-18 RX ORDER — MAGNESIUM HYDROXIDE 1200 MG/15ML
LIQUID ORAL PRN
Status: DISCONTINUED | OUTPATIENT
Start: 2018-07-18 | End: 2018-07-18 | Stop reason: HOSPADM

## 2018-07-18 RX ORDER — FENTANYL CITRATE 50 UG/ML
25-50 INJECTION, SOLUTION INTRAMUSCULAR; INTRAVENOUS EVERY 5 MIN PRN
Status: DISCONTINUED | OUTPATIENT
Start: 2018-07-18 | End: 2018-07-18 | Stop reason: HOSPADM

## 2018-07-18 RX ORDER — NALOXONE HYDROCHLORIDE 0.4 MG/ML
.1-.4 INJECTION, SOLUTION INTRAMUSCULAR; INTRAVENOUS; SUBCUTANEOUS
Status: DISCONTINUED | OUTPATIENT
Start: 2018-07-18 | End: 2018-07-18 | Stop reason: HOSPADM

## 2018-07-18 RX ORDER — ONDANSETRON 2 MG/ML
INJECTION INTRAMUSCULAR; INTRAVENOUS PRN
Status: DISCONTINUED | OUTPATIENT
Start: 2018-07-18 | End: 2018-07-18

## 2018-07-18 RX ORDER — GLYCINE 1.5 G/100ML
SOLUTION IRRIGATION PRN
Status: DISCONTINUED | OUTPATIENT
Start: 2018-07-18 | End: 2018-07-18 | Stop reason: HOSPADM

## 2018-07-18 RX ORDER — SODIUM CHLORIDE, SODIUM LACTATE, POTASSIUM CHLORIDE, CALCIUM CHLORIDE 600; 310; 30; 20 MG/100ML; MG/100ML; MG/100ML; MG/100ML
INJECTION, SOLUTION INTRAVENOUS CONTINUOUS
Status: DISCONTINUED | OUTPATIENT
Start: 2018-07-18 | End: 2018-07-18 | Stop reason: HOSPADM

## 2018-07-18 RX ORDER — NEOSTIGMINE METHYLSULFATE 1 MG/ML
VIAL (ML) INJECTION PRN
Status: DISCONTINUED | OUTPATIENT
Start: 2018-07-18 | End: 2018-07-18

## 2018-07-18 RX ORDER — METOPROLOL TARTRATE 1 MG/ML
1-2 INJECTION, SOLUTION INTRAVENOUS EVERY 5 MIN PRN
Status: DISCONTINUED | OUTPATIENT
Start: 2018-07-18 | End: 2018-07-18 | Stop reason: HOSPADM

## 2018-07-18 RX ORDER — CEFAZOLIN SODIUM 1 G/3ML
1 INJECTION, POWDER, FOR SOLUTION INTRAMUSCULAR; INTRAVENOUS SEE ADMIN INSTRUCTIONS
Status: DISCONTINUED | OUTPATIENT
Start: 2018-07-18 | End: 2018-07-18 | Stop reason: HOSPADM

## 2018-07-18 RX ORDER — OXYCODONE AND ACETAMINOPHEN 5; 325 MG/1; MG/1
1-2 TABLET ORAL EVERY 4 HOURS PRN
Qty: 15 TABLET | Refills: 0 | Status: SHIPPED | OUTPATIENT
Start: 2018-07-18 | End: 2019-02-06

## 2018-07-18 RX ORDER — DEXAMETHASONE SODIUM PHOSPHATE 4 MG/ML
INJECTION, SOLUTION INTRA-ARTICULAR; INTRALESIONAL; INTRAMUSCULAR; INTRAVENOUS; SOFT TISSUE PRN
Status: DISCONTINUED | OUTPATIENT
Start: 2018-07-18 | End: 2018-07-18

## 2018-07-18 RX ORDER — HYDROMORPHONE HYDROCHLORIDE 1 MG/ML
.3-.5 INJECTION, SOLUTION INTRAMUSCULAR; INTRAVENOUS; SUBCUTANEOUS EVERY 10 MIN PRN
Status: DISCONTINUED | OUTPATIENT
Start: 2018-07-18 | End: 2018-07-18 | Stop reason: HOSPADM

## 2018-07-18 RX ORDER — HYDRALAZINE HYDROCHLORIDE 20 MG/ML
2.5-5 INJECTION INTRAMUSCULAR; INTRAVENOUS EVERY 10 MIN PRN
Status: DISCONTINUED | OUTPATIENT
Start: 2018-07-18 | End: 2018-07-18 | Stop reason: HOSPADM

## 2018-07-18 RX ORDER — CEFAZOLIN SODIUM 1 G/50ML
3 SOLUTION INTRAVENOUS
Status: COMPLETED | OUTPATIENT
Start: 2018-07-18 | End: 2018-07-18

## 2018-07-18 RX ORDER — LIDOCAINE 40 MG/G
CREAM TOPICAL
Status: DISCONTINUED | OUTPATIENT
Start: 2018-07-18 | End: 2018-07-18 | Stop reason: HOSPADM

## 2018-07-18 RX ORDER — OXYCODONE HYDROCHLORIDE 5 MG/1
5 TABLET ORAL EVERY 4 HOURS PRN
Status: DISCONTINUED | OUTPATIENT
Start: 2018-07-18 | End: 2018-07-18 | Stop reason: HOSPADM

## 2018-07-18 RX ORDER — OXYCODONE HYDROCHLORIDE 5 MG/1
5 TABLET ORAL
Status: COMPLETED | OUTPATIENT
Start: 2018-07-18 | End: 2018-07-18

## 2018-07-18 RX ORDER — ONDANSETRON 2 MG/ML
4 INJECTION INTRAMUSCULAR; INTRAVENOUS EVERY 30 MIN PRN
Status: DISCONTINUED | OUTPATIENT
Start: 2018-07-18 | End: 2018-07-18 | Stop reason: HOSPADM

## 2018-07-18 RX ORDER — FENTANYL CITRATE 50 UG/ML
INJECTION, SOLUTION INTRAMUSCULAR; INTRAVENOUS PRN
Status: DISCONTINUED | OUTPATIENT
Start: 2018-07-18 | End: 2018-07-18

## 2018-07-18 RX ORDER — FENTANYL CITRATE 50 UG/ML
25-50 INJECTION, SOLUTION INTRAMUSCULAR; INTRAVENOUS
Status: DISCONTINUED | OUTPATIENT
Start: 2018-07-18 | End: 2018-07-18 | Stop reason: HOSPADM

## 2018-07-18 RX ORDER — KETOROLAC TROMETHAMINE 30 MG/ML
30 INJECTION, SOLUTION INTRAMUSCULAR; INTRAVENOUS EVERY 6 HOURS PRN
Status: DISCONTINUED | OUTPATIENT
Start: 2018-07-18 | End: 2018-07-18 | Stop reason: HOSPADM

## 2018-07-18 RX ORDER — ONDANSETRON 4 MG/1
4 TABLET, ORALLY DISINTEGRATING ORAL EVERY 30 MIN PRN
Status: DISCONTINUED | OUTPATIENT
Start: 2018-07-18 | End: 2018-07-18 | Stop reason: HOSPADM

## 2018-07-18 RX ORDER — OXYCODONE AND ACETAMINOPHEN 5; 325 MG/1; MG/1
1 TABLET ORAL EVERY 4 HOURS PRN
Status: DISCONTINUED | OUTPATIENT
Start: 2018-07-18 | End: 2018-07-18 | Stop reason: HOSPADM

## 2018-07-18 RX ORDER — BUPIVACAINE HYDROCHLORIDE 2.5 MG/ML
INJECTION, SOLUTION EPIDURAL; INFILTRATION; INTRACAUDAL PRN
Status: DISCONTINUED | OUTPATIENT
Start: 2018-07-18 | End: 2018-07-18 | Stop reason: HOSPADM

## 2018-07-18 RX ORDER — TAMSULOSIN HYDROCHLORIDE 0.4 MG/1
0.4 CAPSULE ORAL
Status: DISCONTINUED | OUTPATIENT
Start: 2018-07-18 | End: 2018-07-18 | Stop reason: HOSPADM

## 2018-07-18 RX ORDER — PROPOFOL 10 MG/ML
INJECTION, EMULSION INTRAVENOUS PRN
Status: DISCONTINUED | OUTPATIENT
Start: 2018-07-18 | End: 2018-07-18

## 2018-07-18 RX ORDER — MEPERIDINE HYDROCHLORIDE 50 MG/ML
12.5 INJECTION INTRAMUSCULAR; INTRAVENOUS; SUBCUTANEOUS
Status: DISCONTINUED | OUTPATIENT
Start: 2018-07-18 | End: 2018-07-18 | Stop reason: HOSPADM

## 2018-07-18 RX ORDER — GLYCOPYRROLATE 0.2 MG/ML
INJECTION, SOLUTION INTRAMUSCULAR; INTRAVENOUS PRN
Status: DISCONTINUED | OUTPATIENT
Start: 2018-07-18 | End: 2018-07-18

## 2018-07-18 RX ORDER — IBUPROFEN 600 MG/1
600 TABLET, FILM COATED ORAL EVERY 6 HOURS PRN
Qty: 30 TABLET | Refills: 0 | COMMUNITY
Start: 2018-07-18 | End: 2024-09-30

## 2018-07-18 RX ADMIN — MIDAZOLAM 2 MG: 1 INJECTION INTRAMUSCULAR; INTRAVENOUS at 08:57

## 2018-07-18 RX ADMIN — FENTANYL CITRATE 100 MCG: 50 INJECTION, SOLUTION INTRAMUSCULAR; INTRAVENOUS at 09:12

## 2018-07-18 RX ADMIN — Medication 3 G: at 08:57

## 2018-07-18 RX ADMIN — Medication 100 MG: at 09:03

## 2018-07-18 RX ADMIN — Medication 3 MG: at 09:43

## 2018-07-18 RX ADMIN — FENTANYL CITRATE 150 MCG: 50 INJECTION, SOLUTION INTRAMUSCULAR; INTRAVENOUS at 09:03

## 2018-07-18 RX ADMIN — ONDANSETRON 4 MG: 2 INJECTION INTRAMUSCULAR; INTRAVENOUS at 09:43

## 2018-07-18 RX ADMIN — SODIUM CHLORIDE, POTASSIUM CHLORIDE, SODIUM LACTATE AND CALCIUM CHLORIDE: 600; 310; 30; 20 INJECTION, SOLUTION INTRAVENOUS at 08:06

## 2018-07-18 RX ADMIN — ROCURONIUM BROMIDE 5 MG: 10 INJECTION INTRAVENOUS at 09:03

## 2018-07-18 RX ADMIN — FENTANYL CITRATE 50 MCG: 50 INJECTION, SOLUTION INTRAMUSCULAR; INTRAVENOUS at 10:24

## 2018-07-18 RX ADMIN — OXYCODONE HYDROCHLORIDE AND ACETAMINOPHEN 1 TABLET: 5; 325 TABLET ORAL at 11:16

## 2018-07-18 RX ADMIN — ROCURONIUM BROMIDE 45 MG: 10 INJECTION INTRAVENOUS at 09:06

## 2018-07-18 RX ADMIN — PROPOFOL 200 MG: 10 INJECTION, EMULSION INTRAVENOUS at 09:03

## 2018-07-18 RX ADMIN — DEXAMETHASONE SODIUM PHOSPHATE 4 MG: 4 INJECTION, SOLUTION INTRA-ARTICULAR; INTRALESIONAL; INTRAMUSCULAR; INTRAVENOUS; SOFT TISSUE at 09:03

## 2018-07-18 RX ADMIN — OXYCODONE HYDROCHLORIDE 5 MG: 5 TABLET ORAL at 10:34

## 2018-07-18 RX ADMIN — FENTANYL CITRATE 50 MCG: 50 INJECTION, SOLUTION INTRAMUSCULAR; INTRAVENOUS at 10:33

## 2018-07-18 RX ADMIN — GLYCOPYRROLATE 0.6 MG: 0.2 INJECTION, SOLUTION INTRAMUSCULAR; INTRAVENOUS at 09:43

## 2018-07-18 ASSESSMENT — LIFESTYLE VARIABLES: TOBACCO_USE: 1

## 2018-07-18 NOTE — ANESTHESIA CARE TRANSFER NOTE
Patient: Montez Valerio    Procedure(s):  open epigastric hernia repair with mesh - Wound Class: I-Clean    Diagnosis: open epigastric hernia repair with mesh  Diagnosis Additional Information: No value filed.    Anesthesia Type:   General, ETT     Note:  Airway :Face Mask  Patient transferred to:PACU  Handoff Report: Identifed the Patient, Identified the Reponsible Provider, Reviewed the pertinent medical history, Discussed the surgical course, Reviewed Intra-OP anesthesia mangement and issues during anesthesia, Set expectations for post-procedure period and Allowed opportunity for questions and acknowledgement of understanding      Vitals: (Last set prior to Anesthesia Care Transfer)    CRNA VITALS  7/18/2018 0921 - 7/18/2018 0956      7/18/2018             Pulse: 72    SpO2: 98 %    Resp Rate (observed): 17                Electronically Signed By: STEPHANIE Strauss CRNA  July 18, 2018  9:56 AM

## 2018-07-18 NOTE — IP AVS SNAPSHOT
[facility]    [address]    [facility] [address]    Phone:  [phone]    Fax:  [phone]                                       After Visit Summary   7/18/2018    Montez Valerio    MRN: [id number]           After Visit Summary Signature Page     I have received my discharge instructions, and my questions have been answered. I have discussed any challenges I see with this plan with the nurse or doctor.    ..........................................................................................................................................  Patient/Patient Representative Signature      ..........................................................................................................................................  Patient Representative Print Name and Relationship to Patient    ..................................................               ................................................  Date                                            Time    ..........................................................................................................................................  Reviewed by Signature/Title    ...................................................              ..............................................  Date                                                            Time

## 2018-07-18 NOTE — ANESTHESIA PREPROCEDURE EVALUATION
Anesthesia Evaluation     . Pt has had prior anesthetic. Type: General    History of anesthetic complications   - PONV        ROS/MED HX    ENT/Pulmonary:     (+)sleep apnea, tobacco use, Past use asthma , . .    Neurologic:       Cardiovascular:     (+) Dyslipidemia, hypertension----. : . . [place], . :. .       METS/Exercise Tolerance:     Hematologic:         Musculoskeletal:   (+) arthritis, , , -       GI/Hepatic:     (+) GERD       Renal/Genitourinary:         Endo:     (+) type II DM Obesity, .      Psychiatric:         Infectious Disease:         Malignancy:         Other:                     Physical Exam      Airway   Mallampati: II  TM distance: >3 FB  Neck ROM: full    Dental     Cardiovascular   Rhythm and rate: regular and normal      Pulmonary    breath sounds clear to auscultation                    Anesthesia Plan      History & Physical Review  History and physical reviewed and following examination; no interval change.    ASA Status:  3 .    NPO Status:  > 8 hours    Plan for General and ETT with Intravenous and Propofol induction. Maintenance will be Balanced.    PONV prophylaxis:  Ondansetron (or other 5HT-3)       Postoperative Care  Postoperative pain management:  IV analgesics, Oral pain medications and Multi-modal analgesia.      Consents  Anesthetic plan, risks, benefits and alternatives discussed with:  Patient..                          .

## 2018-07-18 NOTE — OP NOTE
General Surgery Operative Note      Pre-operative diagnosis: Epigastric Hernia   Post-operative diagnosis: Same    Procedure: Epigastric Herniorrhaphy with Bard Ventralex ST Hernia Patch (6.4 cm round)    Surgeon: Chastity Rios MD   Assistant(s): Rajat Ortiz PA-C  The Physician Assistant was medically necessary for their expertise in prepping, suctioning, suturing and retraction.   Anesthesia: General    Estimated blood loss:   2 cc       INDICATION:  Symptomatic epigastric hernia immediately cephalad and to the right of the umbilicus which is increasing in size.  DESCRIPTION OF PROCEDURE: The patient was placed on the table in supine position. The abdomen was prepped and draped in standard sterile fashion. A transverse incision just above the umbilicus was made with a blade. The incision was carried down into the subcutaneous tissue. The hernia contents (preperitoneal fat) were  from surrounding subcutaneous tissue using blunt and sharp dissection. The fascial defect immediately cephalad and to the right of the umbilicus measured 8 mm. The preperitoneal space was developed circumferentially to allow for placement of the 6.4 cm Ventralex ST mesh patch. The mesh was soaked in Irricept and rinsed with saline.  We then trimmed the leaflets at the level of the fascia and closed the fascia longitudinally with a running 0 PDS suture, incorporating the leaflets of the mesh. Hemostasis was maintained throughout with cautery. We irrigated the wound with Irricept followed by saline. The skin and subcutaneous tissue was anesthetized with local. The subcutaneous tissue was closed with interrupted 3-0 vicryl suture. The skin was closed with a running 4-0 Vicryl subcuticular suture and Dermabond. The patient tolerated the procedure well. Sponge and instrument counts were correct.   Chastity Rios MD

## 2018-07-18 NOTE — H&P (VIEW-ONLY)
[place] [address]  [facility] [address]  [phone]  Dept: [phone]    PRE-OP EVALUATION:  Today's date: 2018    Montez Valerio (: 1970) presents for pre-operative evaluation assessment as requested by Dr. Rios.  He requires evaluation and anesthesia risk assessment prior to undergoing surgery/procedure for treatment of Epigastric hernia.    Fax number for surgical facility: [facility]  Primary Physician: Edna Younger  Type of Anesthesia Anticipated: General    Patient has a Health Care Directive or Living Will:  NO    Preop Questions 2018   Who is doing your surgery? Chastity Rios   What are you having done? HERNIORRHAPHY EPIGASTRIC   Date of Surgery/Procedure: 2018   Facility or Hospital where procedure/surgery will be performed: [facility]   1.  Do you have a history of Heart attack, stroke, stent, coronary bypass surgery, or other heart surgery? No   2.  Do you ever have any pain or discomfort in your chest? YES - in the past    3.  Do you have a history of  Heart Failure? No   4.   Are you troubled by shortness of breath when:  walking on a level surface, or up a slight hill, or at night? occ   5.  Do you currently have a cold, bronchitis or other respiratory infection? No   6.  Do you have a cough, shortness of breath, or wheezing? No   7.  Do you sometimes get pains in the calves of your legs when you walk? YES - occ   8. Do you or anyone in your family have previous history of blood clots? No   9.  Do you or does anyone in your family have a serious bleeding problem such as prolonged bleeding following surgeries or cuts? No   10. Have you ever had problems with anemia or been told to take iron pills? No   11. Have you had any abnormal blood loss such as black, tarry or bloody stools? No   12. Have you ever had a blood transfusion? No   13. Have you or any of your relatives ever had problems with anesthesia? YES -nausea     14. Do you have sleep apnea, excessive snoring or daytime drowsiness? YES - sleep apnea    15. Do you have any prosthetic heart valves? No   16. Do you have prosthetic joints? No         HPI:     DIABETES - Patient has a longstanding history of DiabetesType Type II . Patient is being treated with oral agents and denies significant side effects. Control has been fair. Complicating factors include but are not limited to: hypertension and hyperlipidemia.                                                                                                                            .  HYPERLIPIDEMIA - Patient has a long history of significant Hyperlipidemia requiring medication for treatment with recent good control. Patient reports no problems or side effects with the medication.                                                                                                                                                       .  HYPERTENSION - Patient has longstanding history of HTN , currently denies any symptoms referable to elevated blood pressure. Specifically denies chest pain, palpitations, dyspnea, orthopnea, PND or peripheral edema. Blood pressure readings have been in normal range. Current medication regimen is as listed below. Patient denies any side effects of medication.                                                                                                                                                                                          .    MEDICAL HISTORY:     Patient Active Problem List    Diagnosis Date Noted     SOB (shortness of breath) 04/26/2017     Priority: Medium     Chest pressure 04/26/2017     Priority: Medium     Essential hypertension, benign 11/08/2016     Priority: Medium     Morbid obesity (H) 10/25/2015     Priority: Medium     Type 2 diabetes mellitus without complication (H) 10/31/2010     Priority: Medium     HYPERLIPIDEMIA LDL GOAL <100 10/31/2010     Priority: Medium      Mixed hyperlipidemia 09/27/2007     Priority: Medium     iamdcLUMB/LUMBOSAC DISC DEGEN 01/03/2006     Priority: Medium     Esophageal reflux 04/30/2004     Priority: Medium      Past Medical History:   Diagnosis Date     Arthritis     Neck     Diabetes mellitus (H)     type 2     Dyspnea on exertion      Gastro-oesophageal reflux disease      Hypertension      Other chronic pain     neck pain     PONV (postoperative nausea and vomiting)      Sleep apnea     CPAP     SOB (shortness of breath) 4/26/2017     Past Surgical History:   Procedure Laterality Date     ARTHROSCOPY KNEE  2/12/2014    Procedure: ARTHROSCOPY KNEE;  Right Knee Arthroscopy, Removal of Loose Body, Partial Lateral Meniscectomy  ;  Surgeon: Amado Rondon MD;  Location: [facility] OR     AXILLARY SURGERY      Mass removed left     HC KNEE SCOPE, DIAGNOSTIC  1984    Arthroscopy, Knee right     LAPAROSCOPIC CHOLECYSTECTOMY  12/21/2012    Procedure: LAPAROSCOPIC CHOLECYSTECTOMY;  LAPAROSCOPIC CHOLECYSTECTOMY;  Surgeon: Alton Riggs MD;  Location: [facility] OR     wisdom teeth[       Current Outpatient Prescriptions   Medication Sig Dispense Refill     albuterol (PROAIR HFA/PROVENTIL HFA/VENTOLIN HFA) 108 (90 BASE) MCG/ACT Inhaler Inhale 2 puffs into the lungs every 6 hours 1 Inhaler 3     blood glucose monitoring (ACCU-CHEK HOWARD PLUS) test strip by In Vitro route daily 100 each 1     blood glucose monitoring (ONE TOUCH DELICA) lancets Use to test blood sugars ONE times daily or as directed. 1 Box 3     blood glucose monitoring (ONE TOUCH VERIO IQ) test strip Use to test blood sugars ONE times daily or as directed. 100 each 3     blood glucose monitoring (ONETOUCH VERIO) meter device kit Use to test blood sugars ONE times daily or as directed. 1 kit 0     cholecalciferol (VITAMIN  -D) 1000 UNITS capsule Take 1 capsule by mouth daily       glimepiride (AMARYL) 1 MG tablet Take 1 tablet (1 mg) by mouth daily 90 tablet 1     ibuprofen (ADVIL/MOTRIN) 200 MG  tablet Take 600 mg by mouth 2 times daily       losartan-hydrochlorothiazide (HYZAAR) 50-12.5 MG per tablet Take 1 tablet by mouth daily 90 tablet 1     metFORMIN (GLUCOPHAGE-XR) 500 MG 24 hr tablet Take 2 tablets (1,000 mg) by mouth 2 times daily (with meals) 360 tablet 1     simvastatin (ZOCOR) 20 MG tablet Take 1 tablet (20 mg) by mouth daily at bedtime. 90 tablet 3     OTC products: None, except as noted above    Allergies   Allergen Reactions     No Known Drug Allergies       Latex Allergy: NO    Social History   Substance Use Topics     Smoking status: Former Smoker     Types: Cigars, cigarillos or filtered cigars     Smokeless tobacco: Never Used      Comment: occ. cigar     Alcohol use Yes      Comment: 4-6 drinks, 1-2 nights week, beer/liquor     History   Drug Use No       REVIEW OF SYSTEMS:   CONSTITUTIONAL: NEGATIVE for fever, chills, change in weight  INTEGUMENTARY/SKIN: NEGATIVE for worrisome rashes, moles or lesions  EYES: NEGATIVE for vision changes or irritation  ENT/MOUTH: NEGATIVE for ear, mouth and throat problems  RESP: NEGATIVE for significant cough or SOB  BREAST: NEGATIVE for masses, tenderness or discharge  CV: NEGATIVE for chest pain, palpitations or peripheral edema  GI: NEGATIVE for nausea, abdominal pain, heartburn, or change in bowel habits  : NEGATIVE for frequency, dysuria, or hematuria  MUSCULOSKELETAL: NEGATIVE for significant arthralgias or myalgia  NEURO: NEGATIVE for weakness, dizziness or paresthesias  ENDOCRINE: NEGATIVE for temperature intolerance, skin/hair changes  HEME: NEGATIVE for bleeding problems  PSYCHIATRIC: NEGATIVE for changes in mood or affect    EXAM:   /80  Pulse 101  Temp 98.1  F (36.7  C) (Oral)  Resp 15  Wt 285 lb 14.4 oz (129.7 kg)  SpO2 97%  BMI 44.78 kg/m2    GENERAL APPEARANCE: healthy, alert and no distress     EYES: EOMI,  PERRL     HENT: ear canals and TM's normal and nose and mouth without ulcers or lesions     NECK: no adenopathy, no  asymmetry, masses, or scars and thyroid normal to palpation     RESP: lungs clear to auscultation - no rales, rhonchi or wheezes     CV: regular rates and rhythm, normal S1 S2, no S3 or S4 and no murmur, click or rub     ABDOMEN:  soft, nontender, epigastric hernia noted. no HSM   and bowel sounds normal     MS: extremities normal- no gross deformities noted, no evidence of inflammation in joints, FROM in all extremities.     NEURO: Normal strength and tone, sensory exam grossly normal, mentation intact and speech normal     PSYCH: mentation appears normal. and affect normal/bright     LYMPHATICS: No cervical adenopathy    DIAGNOSTICS:   EKG: sinus rhythm ,  no acute ST/T changes , no LVH by voltage criteria, unchanged from previous tracings  Hemoglobin 14.8  Serum Potassium  3.8  Serum Creatinine 0.70   Hemoglobin A1C  Pending     Recent Labs   Lab Test  06/27/18   1020  06/28/17   1010  11/08/16   0918  08/25/15   1006  01/22/14   1119   HGB  14.8   --   14.0  15.1  14.8   PLT   --    --    --   258  263   NA  136  139  139  139  138   POTASSIUM  3.8  4.7  3.9  4.2  3.9   CR  0.70  0.76  0.81  0.74  0.78   A1C  8.5*  7.2*  6.9*  6.0  7.6*        IMPRESSION:        (Z01.818) Preop general physical exam  (primary encounter diagnosis)  Comment: HERNIORRHAPHY EPIGASTRIC  Plan: Hemoglobin A1c         (E11.9) Type 2 diabetes mellitus without complication, without long-term current use of insulin (H)  Plan: check A1c, 8.5 today  metformin was increased to 1000 mg  twice a day at last office visit. Also on amaryl.     (K43.9) Epigastric hernia  Plan: HERNIORRHAPHY EPIGASTRIC    (I10) Essential hypertension, benign  Plan: BP stable      The proposed surgical procedure is considered INTERMEDIATE risk.    REVISED CARDIAC RISK INDEX  The patient has the following serious cardiovascular risks for perioperative complications such as (MI, PE, VFib and 3  AV Block):  No serious cardiac risks    The patient has the following  additional risks for perioperative complications:  Elevated A1c      ICD-10-CM    1. Preop general physical exam Z01.818 Hemoglobin A1c   2. Type 2 diabetes mellitus without complication, without long-term current use of insulin (H) E11.9    3. Epigastric hernia K43.9    4. Essential hypertension, benign I10        RECOMMENDATIONS:     --Consult hospital rounder / IM to assist post-op medical management      Obstructive Sleep Apnea (or suspected sleep apnea)  Patient is to bring their home CPAP with them on the day of surgery        Diabetes Medication Use  -----Hold usual oral and non-insulin diabetic meds (e.g. Metformin, Actos, Glipizide) while NPO. Pt was advised to check with his surgeon as his A1c is 8.5 and  if they would be ok with a1c of 8.5 for surgery as he would be increased risk for pato/postoperative complications with high A1c. Pt was informed about this . Ideally A1c should be < 8.0 .      ACE Inhibitor or Angiotensin Receptor Blocker (ARB) Use  Ace inhibitor or Angiotensin Receptor Blocker (ARB) and should HOLD this medication for the 24 hours prior to surgery.      APPROVAL GIVEN to proceed with proposed procedure, without further diagnostic evaluation       Signed Electronically by: Edna Younger MD    Copy of this evaluation report is provided to requesting physician.    [facility] Preop Guidelines    Revised Cardiac Risk Index

## 2018-07-18 NOTE — IP AVS SNAPSHOT
MRN:[id number]                      After Visit Summary   7/18/2018    Montez Valerio    MRN: [id number]           Thank you!     Thank you for choosing [facility] for your care. Our goal is always to provide you with excellent care. Hearing back from our patients is one way we can continue to improve our services. Please take a few minutes to complete the written survey that you may receive in the mail after you visit. If you would like to speak to someone directly about your visit please contact Patient Relations at [phone]. Thank you!          Patient Information     Date Of Birth          1970        About your hospital stay     You were admitted on:  July 18, 2018 You last received care in the:  [facility]    You were discharged on:  July 18, 2018       Who to Call     For medical emergencies, please call 911.  For non-urgent questions about your medical care, please call your primary care provider or clinic, [phone]  For questions related to your surgery, please call your surgery clinic        Attending Provider     Provider Specialty    Chastity Rios MD Surgery       Primary Care Provider Office Phone # Fax #    Krishnakumari G MD Aren [phone]      After Care Instructions     Ice to affected area       Ice to operative site PRN                  Further instructions from your care team       HOME CARE FOLLOWING UMBILICAL/VENTRAL HERNIA REPAIR  BRANDON Escobar, VIRAL Mendiola, WESLEY June    DIET:  No restrictions.  Increased fluid intake is recommended. While taking pain medications, increase dietary fiber or add a fiber supplementation like Metamucil or Citrucel to help prevent constipation - a possible side effect of pain medications.    NAUSEA:  If nauseated from the anesthetic/pain meds; rest in bed, get up cautiously with assistance, and drink clear liquids (juice, tea,  "broth).    ACTIVITY:  Light Activity -- you may immediately be up and about as tolerated.  Driving -- you may drive when comfortable and off narcotic pain medications.  Light Work -- resume when comfortable off pain medications.  (If you can drive, you probably can work.)  Strenuous Work/Activity -- limit lifting to 20 pounds for 3 weeks.  Active Sports (running, biking, etc.) -- cautiously resume after 4 weeks.    INCISIONAL CARE:    If you have a dressing in place, keep clean and dry for 48 hours after surgery.  After this timeframe, you may replace the gauze daily if it becomes soiled.    You may remove the dressing and shower 48 hours after surgery.  Do not submerse incision in water for 1 week.    If you have a Dermabond dressing (a type of skin glue), you may shower immediately.    Sutures will absorb and need not be removed.    If present, leave the steri-strips (white paper tapes) in place for 14 days after surgery.    If present, leave Dermabond glue in place until it wears/flakes off.    Expect a variable amount of swelling/bruising/discoloration that may appear around or below the repair site.    Some numbness around the incision is common.    A lump/\"healing ridge\" under the incision is normal and will gradually resolve over the following 1-2 months.    DISCOMFORT:  Local anesthetic placed at surgery should provide relief for 4-8 hours.  Begin taking pain pills before discomfort is severe.  Take the pain medication with some food, when possible, to minimize side effects.  Intermittent use of ice packs to the hernia repair site may help during the first 1-3 weeks after surgery.  Expect gradual improvement.    Over-the-counter anti-inflammatory medications (i.e. Ibuprofen/Advil/Motrin or Naprosyn/Aleve) may be used per package instructions in addition to or while tapering off the narcotic pain medications to decrease swelling and sensitivity at the repair site.  DO NOT TAKE these Anti-inflammatory " medications if your primary physician has advised against doing so, or if you have acid reflux, ulcer, or bleeding disorder, or take blood-thinner medications.  Call your primary physician or the surgery office if you have medication questions.      RETURN APPOINTMENT:  Schedule a follow-up visit 2-3 weeks post-op.  Office Phone:  [phone]     CONTACT US IF THE FOLLOWING DEVELOPS:   1. A fever that is above 101     2. If there is a large amount of drainage, bleeding, or swelling.   3. Severe pain that is not relieved by your prescription.   4. Drainage that is thick, cloudy, yellow, green or white.   5. Any other questions not answered by  Frequently Asked Questions  sheet.      FREQUENTLY ASKED QUESTIONS:    Q:  How should my incision look?    A:  Normally your incision will appear slightly swollen with light redness directly along the incision itself as it heals.  It may feel like a bump or ridge as the healing/scarring happens, and over time (3-4 months) this bump or ridge feeling should slowly go away.  In general, clear or pink watery drainage can be normal at first as your incision heals, but should decrease over time.    Q:  How do I know if my incision is infected?  A:  Look at your incision for signs of infection, like redness around the incision spreading to surrounding skin, or drainage of cloudy or foul-smelling drainage.  If you feel warm, check your temperature to see if you are running a fever.    **If any of these things occur, please notify the nurse at our office.  We may need you to come into the office for an incision check.      Q:  How do I take care of my incision?  A:  If you have a dressing in place - Starting the day after surgery, replace the dressing 1-2 times a day until there is no further drainage from the incision.  At that time, a dressing is no longer needed.  Try to minimize tape on the skin if irritation is occurring at the tape sites.  If you have significant irritation from  tape on the skin, please call the office to discuss other method of dressing your incision.    Small pieces of tape called  steri-strips  may be present directly overlying your incision; these may be removed 10 days after surgery unless otherwise specified by your surgeon.  If these tapes start to loosen at the ends, you may trim them back until they fall off or are removed.    A:  If you had  Dermabond  tissue glue used as a dressing (this causes your incision to look shiny with a clear covering over it) - This type of dressing wears off with time and does not require more dressings over the top unless it is draining around the glue as it wears off.  Do not apply ointments or lotions over the incisions until the glue has completely worn off.    Q:  There is a piece of tape or a sticky  lead  still on my skin.  Can I remove this?  A:  Sometimes the sticky  leads  used for monitoring during surgery or for evaluation in the emergency department are not all removed while you are in the hospital.  These sometimes have a tab or metal dot on them.  You can easily remove these on your own, like taking off a band-aid.  If there is a gel substance under the  lead , simply wipe/clean it off with a washcloth or paper towel.      Q:  What can I do to minimize constipation (very hard stools, or lack of stools)?  A:  Stay well hydrated.  Increase your dietary fiber intake or take a fiber supplement -with plenty of water.  Walk around frequently.  You may consider an over-the-counter stool-softener.  Your Pharmacist can assist you with choosing one that is stocked at your pharmacy.  Constipation is also one of the most common side effects of pain medication.  If you are using pain medication, be pro-active and try to PREVENT problems with constipation by taking the steps above BEFORE constipation becomes a problem.    Q:  What do I do if I need more pain medications?  A:  Call the office to receive refills.  Be aware that certain  pain meds cannot be called into a pharmacy and actually require a paper prescription.  A change may be made in your pain med as you progress thru your recovery period or if you have side effects to certain meds.    --Pain meds are NOT refilled after 5pm on weekdays, and NOT AT ALL on the weekends, so please look ahead to prevent problems.      Q:  Why am I having a hard time sleeping now that I am at home?  A:  Many medications you receive while you are in the hospital can impact your sleep for a number of days after your surgery/hospitalization.  Decreased level of activity and naps during the day may also make sleeping at night difficult.  Try to minimize day-time naps, and get up frequently during the day to walk around your home during your recovery time.  Sleep aides may be of some help, but are not recommended for long-term use.      Q:  I am having some back discomfort.  What should I do?  A:  This may be related to certain positioning that was required for your surgery, extended periods of time in bed, or other changes in your overall activity level.  You may try ice, heat, acetaminophen, or ibuprofen to treat this temporarily.  Note that many pain medications have acetaminophen in them and would state this on the prescription bottle.  Be sure not to exceed the maximum of 4000mg per day of acetaminophen.     **If the pain you are having does not resolve, is severe, or is a flare of back pain you have had on other occasions prior to surgery, please contact your primary physician for further recommendations or for an appointment to be examined at their office.    Q:  Why am I having headaches?  A:  Headaches can be caused by many things:  caffeine withdrawal, use of pain meds, dehydration, high blood pressure, lack of sleep, over-activity/exhaustion, flare-up of usual migraine headaches.  If you feel this is related to muscle tension (a band-like feeling around the head, or a pressure at the low-back of the  head) you may try ice or heat to this area.  You may need to drink more fluids (try electrolyte drink like Gatorade), rest, or take your usual migraine medications.   **If your headaches do not resolve, worsen, are accompanied by other symptoms, or if your blood pressure is high, please call your primary physician for recommendation and/or examination.    Q:  I am unable to urinate.  What do I do?  A:  A small percentage of people can have difficulty urinating initially after surgery.  This includes being able to urinate only a very small amount at a time and feeling discomfort or pressure in the very low abdomen.  This is called  urinary retention , and is actually an urgent situation.  Proceed to your nearest Emergency department for evaluation (not an Urgent Care Center).  Sometimes the bladder does not work correctly after certain medications you receive during surgery, or related to certain procedures.  You may need to have a catheter placed until your bladder recovers.  When planning to go to an Emergency department, it may help to call the ER to let them know you are coming in for this problem after a surgery.  This may help you get in quicker to be evaluated.  **If you have symptoms of a urinary tract infection, please contact your primary physician for the proper evaluation and treatment.          If you have other questions, please call the office Monday thru Friday between 8am and 5pm to discuss with the nurse or physician assistant.  #[phone]    There is a surgeon ON CALL on weekday evenings and over the weekend in case of urgent need only, and may be contacted at the same number.    If you are having an emergency, call 911 or proceed to your nearest emergency department.    GENERAL ANESTHESIA OR SEDATION ADULT DISCHARGE INSTRUCTIONS   SPECIAL PRECAUTIONS FOR 24 HOURS AFTER SURGERY    IT IS NOT UNUSUAL TO FEEL LIGHT-HEADED OR FAINT, UP TO 24 HOURS AFTER SURGERY OR WHILE TAKING PAIN MEDICATION.  IF  "YOU HAVE THESE SYMPTOMS; SIT FOR A FEW MINUTES BEFORE STANDING AND HAVE SOMEONE ASSIST YOU WHEN YOU GET UP TO WALK OR USE THE BATHROOM.    YOU SHOULD REST AND RELAX FOR THE NEXT 24 HOURS AND YOU MUST MAKE ARRANGEMENTS TO HAVE SOMEONE STAY WITH YOU FOR AT LEAST 24 HOURS AFTER YOUR DISCHARGE.  AVOID HAZARDOUS AND STRENUOUS ACTIVITIES.  DO NOT MAKE IMPORTANT DECISIONS FOR 24 HOURS.    DO NOT DRIVE ANY VEHICLE OR OPERATE MECHANICAL EQUIPMENT FOR 24 HOURS FOLLOWING THE END OF YOUR SURGERY.  EVEN THOUGH YOU MAY FEEL NORMAL, YOUR REACTIONS MAY BE AFFECTED BY THE MEDICATION YOU HAVE RECEIVED.    DO NOT DRINK ALCOHOLIC BEVERAGES FOR 24 HOURS FOLLOWING YOUR SURGERY.    DRINK CLEAR LIQUIDS (APPLE JUICE, GINGER ALE, 7-UP, BROTH, ETC.).  PROGRESS TO YOUR REGULAR DIET AS YOU FEEL ABLE.    YOU MAY HAVE A DRY MOUTH, A SORE THROAT, MUSCLES ACHES OR TROUBLE SLEEPING.  THESE SHOULD GO AWAY AFTER 24 HOURS.    CALL YOUR DOCTOR FOR ANY OF THE FOLLOWING:  SIGNS OF INFECTION (FEVER, GROWING TENDERNESS AT THE SURGERY SITE, A LARGE AMOUNT OF DRAINAGE OR BLEEDING, SEVERE PAIN, FOUL-SMELLING DRAINAGE, REDNESS OR SWELLING.    IT HAS BEEN OVER 8 TO 10 HOURS SINCE SURGERY AND YOU ARE STILL NOT ABLE TO URINATE (PASS WATER).       You received one tablet of Oxycodone at 10:45am.         Pending Results     No orders found from 7/16/2018 to 7/19/2018.            Admission Information     Date & Time Provider Department Dept. Phone    7/18/2018 Chastity Rios MD [facility] [phone]      Your Vitals Were     Blood Pressure Temperature Respirations Height Weight Pulse Oximetry    119/73 98  F (36.7  C) (Temporal) 9 1.702 m (5' 7.01\") 127.8 kg (281 lb 11.2 oz) 88%    BMI (Body Mass Index)                   44.11 kg/m2           MyChart Information     [facility] gives you secure access to your electronic health record. If you see a primary care provider, you can also send messages to your care team and make " appointments. If you have questions, please call your primary care clinic.  If you do not have a primary care provider, please call [phone] and they will assist you.        Care EveryWhere ID     This is your Care EveryWhere ID. This could be used by other organizations to access your [place] medical records  [id number]        Equal Access to Services     BOZENA GONZALES : Hadteresa richardson hadshelleyrajan Sokingali, waaxda luqadaha, qaybta kaalmada urvashiisaicathi, tavo lundyfrankdebra dias . So [facility] [phone].    ATENCIÓN: Si habla español, tiene a man disposición servicios gratuitos de asistencia lingüística. Joseph al [phone].    We comply with applicable federal civil rights laws and [place] laws. We do not discriminate on the basis of race, color, national origin, age, disability, sex, sexual orientation, or gender identity.               Review of your medicines      START taking        Dose / Directions    oxyCODONE-acetaminophen 5-325 MG per tablet   Commonly known as:  PERCOCET   Used for:  Epigastric hernia        Dose:  1-2 tablet   Take 1-2 tablets by mouth every 4 hours as needed for pain (moderate to severe)   Quantity:  15 tablet   Refills:  0       senna-docusate 8.6-50 MG per tablet   Commonly known as:  SENOKOT-S;PERICOLACE   Used for:  Epigastric hernia        Dose:  1-2 tablet   Take 1-2 tablets by mouth 2 times daily Take while on oral narcotics to prevent or treat constipation.   Quantity:  30 tablet   Refills:  0         CONTINUE these medicines which may have CHANGED, or have new prescriptions. If we are uncertain of the size of tablets/capsules you have at home, strength may be listed as something that might have changed.        Dose / Directions    ibuprofen 600 MG tablet   Commonly known as:  ADVIL/MOTRIN   This may have changed:    - medication strength  - when to take this  - reasons to take this   Used for:  Epigastric hernia        Dose:  600 mg   Take 1 tablet (600 mg) by  mouth every 6 hours as needed for pain (mild)   Quantity:  30 tablet   Refills:  0         CONTINUE these medicines which have NOT CHANGED        Dose / Directions    albuterol 108 (90 Base) MCG/ACT Inhaler   Commonly known as:  PROAIR HFA/PROVENTIL HFA/VENTOLIN HFA   Used for:  SOB (shortness of breath)        Dose:  2 puff   Inhale 2 puffs into the lungs every 6 hours   Quantity:  1 Inhaler   Refills:  3       blood glucose monitoring lancets   Used for:  Type 2 diabetes mellitus without complication (H)        Use to test blood sugars ONE times daily or as directed.   Quantity:  1 Box   Refills:  3       blood glucose monitoring meter device kit   Used for:  Type 2 diabetes mellitus without complication (H)        Use to test blood sugars ONE times daily or as directed.   Quantity:  1 kit   Refills:  0       * blood glucose monitoring test strip   Commonly known as:  ACCU-CHEK HOWARD PLUS   Used for:  Type 2 diabetes mellitus without complication, without long-term current use of insulin (H)        by In Vitro route daily   Quantity:  100 each   Refills:  1       * blood glucose monitoring test strip   Commonly known as:  ONETOUCH VERIO IQ   Used for:  Type 2 diabetes mellitus without complication (H)        Use to test blood sugars ONE times daily or as directed.   Quantity:  100 each   Refills:  3       cholecalciferol 1000 units capsule   Commonly known as:  vitamin  -D        Dose:  1 capsule   Take 1 capsule by mouth daily   Refills:  0       glimepiride 1 MG tablet   Commonly known as:  AMARYL   Used for:  Type 2 diabetes mellitus without complication, without long-term current use of insulin (H)        Dose:  1 mg   Take 1 tablet (1 mg) by mouth daily   Quantity:  90 tablet   Refills:  1       losartan-hydrochlorothiazide 50-12.5 MG per tablet   Commonly known as:  HYZAAR   Used for:  Essential hypertension with goal blood pressure less than 140/90        Dose:  1 tablet   Take 1 tablet by mouth daily    Quantity:  90 tablet   Refills:  1       metFORMIN 500 MG 24 hr tablet   Commonly known as:  GLUCOPHAGE-XR   Used for:  Type 2 diabetes mellitus without complication, without long-term current use of insulin (H)        Dose:  1000 mg   Take 2 tablets (1,000 mg) by mouth 2 times daily (with meals)   Quantity:  360 tablet   Refills:  1       simvastatin 20 MG tablet   Commonly known as:  ZOCOR   Used for:  Hyperlipidemia LDL goal <100        Dose:  20 mg   Take 1 tablet (20 mg) by mouth daily at bedtime.   Quantity:  90 tablet   Refills:  3       * Notice:  This list has 2 medication(s) that are the same as other medications prescribed for you. Read the directions carefully, and ask your doctor or other care provider to review them with you.         Where to get your medicines      These medications were sent to [facility] [address] [address] [address]     Phone:  [phone]     senna-docusate 8.6-50 MG per tablet         Some of these will need a paper prescription and others can be bought over the counter. Ask your nurse if you have questions.     Bring a paper prescription for each of these medications     oxyCODONE-acetaminophen 5-325 MG per tablet       You don't need a prescription for these medications     ibuprofen 600 MG tablet                Protect others around you: Learn how to safely use, store and throw away your medicines at www.disposemymeds.org.        Information about OPIOIDS     PRESCRIPTION OPIOIDS: WHAT YOU NEED TO KNOW   We gave you an opioid (narcotic) pain medicine. It is important to manage your pain, but opioids are not always the best choice. You should first try all the other options your care team gave you. Take this medicine for as short a time (and as few doses) as possible.     These medicines have risks:    DO NOT drive when on new or higher doses of pain medicine. These medicines can affect your alertness  and reaction times, and you could be arrested for driving under the influence (DUI). If you need to use opioids long-term, talk to your care team about driving.    DO NOT operate heave machinery    DO NOT do any other dangerous activities while taking these medicines.     DO NOT drink any alcohol while taking these medicines.      If the opioid prescribed includes acetaminophen, DO NOT take with any other medicines that contain acetaminophen. Read all labels carefully. Look for the word  acetaminophen  or  Tylenol.  Ask your pharmacist if you have questions or are unsure.    You can get addicted to pain medicines, especially if you have a history of addiction (chemical, alcohol or substance dependence). Talk to your care team about ways to reduce this risk.    Store your pills in a secure place, locked if possible. We will not replace any lost or stolen medicine. If you don t finish your medicine, please throw away (dispose) as directed by your pharmacist. The [place] Pollution Control Agency has more information about safe disposal: https://www.pca.[facility].[place].us/living-green/managing-unwanted-medications.     All opioids tend to cause constipation. Drink plenty of water and eat foods that have a lot of fiber, such as fruits, vegetables, prune juice, apple juice and high-fiber cereal. Take a laxative (Miralax, milk of magnesia, Colace, Senna) if you don t move your bowels at least every other day.              Medication List: This is a list of all your medications and when to take them. Check marks below indicate your daily home schedule. Keep this list as a reference.      Medications           Morning Afternoon Evening Bedtime As Needed    albuterol 108 (90 Base) MCG/ACT Inhaler   Commonly known as:  PROAIR HFA/PROVENTIL HFA/VENTOLIN HFA   Inhale 2 puffs into the lungs every 6 hours                                blood glucose monitoring lancets   Use to test blood sugars ONE times daily or as directed.                                 blood glucose monitoring meter device kit   Use to test blood sugars ONE times daily or as directed.                                * blood glucose monitoring test strip   Commonly known as:  ACCU-CHEK HOWARD PLUS   by In Vitro route daily                                * blood glucose monitoring test strip   Commonly known as:  ONETOUCH VERIO IQ   Use to test blood sugars ONE times daily or as directed.                                cholecalciferol 1000 units capsule   Commonly known as:  vitamin  -D   Take 1 capsule by mouth daily                                glimepiride 1 MG tablet   Commonly known as:  AMARYL   Take 1 tablet (1 mg) by mouth daily                                ibuprofen 600 MG tablet   Commonly known as:  ADVIL/MOTRIN   Take 1 tablet (600 mg) by mouth every 6 hours as needed for pain (mild)                                losartan-hydrochlorothiazide 50-12.5 MG per tablet   Commonly known as:  HYZAAR   Take 1 tablet by mouth daily                                metFORMIN 500 MG 24 hr tablet   Commonly known as:  GLUCOPHAGE-XR   Take 2 tablets (1,000 mg) by mouth 2 times daily (with meals)                                oxyCODONE-acetaminophen 5-325 MG per tablet   Commonly known as:  PERCOCET   Take 1-2 tablets by mouth every 4 hours as needed for pain (moderate to severe)                                senna-docusate 8.6-50 MG per tablet   Commonly known as:  SENOKOT-S;PERICOLACE   Take 1-2 tablets by mouth 2 times daily Take while on oral narcotics to prevent or treat constipation.                                simvastatin 20 MG tablet   Commonly known as:  ZOCOR   Take 1 tablet (20 mg) by mouth daily at bedtime.                                * Notice:  This list has 2 medication(s) that are the same as other medications prescribed for you. Read the directions carefully, and ask your doctor or other care provider to review them with you.

## 2018-07-18 NOTE — DISCHARGE INSTRUCTIONS
"HOME CARE FOLLOWING UMBILICAL/VENTRAL HERNIA REPAIR  BRANDON Escobar, VIRAL Mendiola, WES[place] June    DIET:  No restrictions.  Increased fluid intake is recommended. While taking pain medications, increase dietary fiber or add a fiber supplementation like Metamucil or Citrucel to help prevent constipation - a possible side effect of pain medications.    NAUSEA:  If nauseated from the anesthetic/pain meds; rest in bed, get up cautiously with assistance, and drink clear liquids (juice, tea, broth).    ACTIVITY:  Light Activity -- you may immediately be up and about as tolerated.  Driving -- you may drive when comfortable and off narcotic pain medications.  Light Work -- resume when comfortable off pain medications.  (If you can drive, you probably can work.)  Strenuous Work/Activity -- limit lifting to 20 pounds for 3 weeks.  Active Sports (running, biking, etc.) -- cautiously resume after 4 weeks.    INCISIONAL CARE:    If you have a dressing in place, keep clean and dry for 48 hours after surgery.  After this timeframe, you may replace the gauze daily if it becomes soiled.    You may remove the dressing and shower 48 hours after surgery.  Do not submerse incision in water for 1 week.    If you have a Dermabond dressing (a type of skin glue), you may shower immediately.    Sutures will absorb and need not be removed.    If present, leave the steri-strips (white paper tapes) in place for 14 days after surgery.    If present, leave Dermabond glue in place until it wears/flakes off.    Expect a variable amount of swelling/bruising/discoloration that may appear around or below the repair site.    Some numbness around the incision is common.    A lump/\"healing ridge\" under the incision is normal and will gradually resolve over the following 1-2 months.    DISCOMFORT:  Local anesthetic placed at surgery should provide relief for 4-8 hours.  Begin taking pain pills before discomfort is severe.  Take the " pain medication with some food, when possible, to minimize side effects.  Intermittent use of ice packs to the hernia repair site may help during the first 1-3 weeks after surgery.  Expect gradual improvement.    Over-the-counter anti-inflammatory medications (i.e. Ibuprofen/Advil/Motrin or Naprosyn/Aleve) may be used per package instructions in addition to or while tapering off the narcotic pain medications to decrease swelling and sensitivity at the repair site.  DO NOT TAKE these Anti-inflammatory medications if your primary physician has advised against doing so, or if you have acid reflux, ulcer, or bleeding disorder, or take blood-thinner medications.  Call your primary physician or the surgery office if you have medication questions.      RETURN APPOINTMENT:  Schedule a follow-up visit 2-3 weeks post-op.  Office Phone:  [phone]     CONTACT US IF THE FOLLOWING DEVELOPS:   1. A fever that is above 101     2. If there is a large amount of drainage, bleeding, or swelling.   3. Severe pain that is not relieved by your prescription.   4. Drainage that is thick, cloudy, yellow, green or white.   5. Any other questions not answered by  Frequently Asked Questions  sheet.      FREQUENTLY ASKED QUESTIONS:    Q:  How should my incision look?    A:  Normally your incision will appear slightly swollen with light redness directly along the incision itself as it heals.  It may feel like a bump or ridge as the healing/scarring happens, and over time (3-4 months) this bump or ridge feeling should slowly go away.  In general, clear or pink watery drainage can be normal at first as your incision heals, but should decrease over time.    Q:  How do I know if my incision is infected?  A:  Look at your incision for signs of infection, like redness around the incision spreading to surrounding skin, or drainage of cloudy or foul-smelling drainage.  If you feel warm, check your temperature to see if you are running a fever.     **If any of these things occur, please notify the nurse at our office.  We may need you to come into the office for an incision check.      Q:  How do I take care of my incision?  A:  If you have a dressing in place - Starting the day after surgery, replace the dressing 1-2 times a day until there is no further drainage from the incision.  At that time, a dressing is no longer needed.  Try to minimize tape on the skin if irritation is occurring at the tape sites.  If you have significant irritation from tape on the skin, please call the office to discuss other method of dressing your incision.    Small pieces of tape called  steri-strips  may be present directly overlying your incision; these may be removed 10 days after surgery unless otherwise specified by your surgeon.  If these tapes start to loosen at the ends, you may trim them back until they fall off or are removed.    A:  If you had  Dermabond  tissue glue used as a dressing (this causes your incision to look shiny with a clear covering over it) - This type of dressing wears off with time and does not require more dressings over the top unless it is draining around the glue as it wears off.  Do not apply ointments or lotions over the incisions until the glue has completely worn off.    Q:  There is a piece of tape or a sticky  lead  still on my skin.  Can I remove this?  A:  Sometimes the sticky  leads  used for monitoring during surgery or for evaluation in the emergency department are not all removed while you are in the hospital.  These sometimes have a tab or metal dot on them.  You can easily remove these on your own, like taking off a band-aid.  If there is a gel substance under the  lead , simply wipe/clean it off with a washcloth or paper towel.      Q:  What can I do to minimize constipation (very hard stools, or lack of stools)?  A:  Stay well hydrated.  Increase your dietary fiber intake or take a fiber supplement -with plenty of water.  Walk  around frequently.  You may consider an over-the-counter stool-softener.  Your Pharmacist can assist you with choosing one that is stocked at your pharmacy.  Constipation is also one of the most common side effects of pain medication.  If you are using pain medication, be pro-active and try to PREVENT problems with constipation by taking the steps above BEFORE constipation becomes a problem.    Q:  What do I do if I need more pain medications?  A:  Call the office to receive refills.  Be aware that certain pain meds cannot be called into a pharmacy and actually require a paper prescription.  A change may be made in your pain med as you progress thru your recovery period or if you have side effects to certain meds.    --Pain meds are NOT refilled after 5pm on weekdays, and NOT AT ALL on the weekends, so please look ahead to prevent problems.      Q:  Why am I having a hard time sleeping now that I am at home?  A:  Many medications you receive while you are in the hospital can impact your sleep for a number of days after your surgery/hospitalization.  Decreased level of activity and naps during the day may also make sleeping at night difficult.  Try to minimize day-time naps, and get up frequently during the day to walk around your home during your recovery time.  Sleep aides may be of some help, but are not recommended for long-term use.      Q:  I am having some back discomfort.  What should I do?  A:  This may be related to certain positioning that was required for your surgery, extended periods of time in bed, or other changes in your overall activity level.  You may try ice, heat, acetaminophen, or ibuprofen to treat this temporarily.  Note that many pain medications have acetaminophen in them and would state this on the prescription bottle.  Be sure not to exceed the maximum of 4000mg per day of acetaminophen.     **If the pain you are having does not resolve, is severe, or is a flare of back pain you have had  on other occasions prior to surgery, please contact your primary physician for further recommendations or for an appointment to be examined at their office.    Q:  Why am I having headaches?  A:  Headaches can be caused by many things:  caffeine withdrawal, use of pain meds, dehydration, high blood pressure, lack of sleep, over-activity/exhaustion, flare-up of usual migraine headaches.  If you feel this is related to muscle tension (a band-like feeling around the head, or a pressure at the low-back of the head) you may try ice or heat to this area.  You may need to drink more fluids (try electrolyte drink like Gatorade), rest, or take your usual migraine medications.   **If your headaches do not resolve, worsen, are accompanied by other symptoms, or if your blood pressure is high, please call your primary physician for recommendation and/or examination.    Q:  I am unable to urinate.  What do I do?  A:  A small percentage of people can have difficulty urinating initially after surgery.  This includes being able to urinate only a very small amount at a time and feeling discomfort or pressure in the very low abdomen.  This is called  urinary retention , and is actually an urgent situation.  Proceed to your nearest Emergency department for evaluation (not an Urgent Care Center).  Sometimes the bladder does not work correctly after certain medications you receive during surgery, or related to certain procedures.  You may need to have a catheter placed until your bladder recovers.  When planning to go to an Emergency department, it may help to call the ER to let them know you are coming in for this problem after a surgery.  This may help you get in quicker to be evaluated.  **If you have symptoms of a urinary tract infection, please contact your primary physician for the proper evaluation and treatment.          If you have other questions, please call the office Monday thru Friday between 8am and 5pm to discuss with the  nurse or physician assistant.  #[phone]    There is a surgeon ON CALL on weekday evenings and over the weekend in case of urgent need only, and may be contacted at the same number.    If you are having an emergency, call 911 or proceed to your nearest emergency department.    GENERAL ANESTHESIA OR SEDATION ADULT DISCHARGE INSTRUCTIONS   SPECIAL PRECAUTIONS FOR 24 HOURS AFTER SURGERY    IT IS NOT UNUSUAL TO FEEL LIGHT-HEADED OR FAINT, UP TO 24 HOURS AFTER SURGERY OR WHILE TAKING PAIN MEDICATION.  IF YOU HAVE THESE SYMPTOMS; SIT FOR A FEW MINUTES BEFORE STANDING AND HAVE SOMEONE ASSIST YOU WHEN YOU GET UP TO WALK OR USE THE BATHROOM.    YOU SHOULD REST AND RELAX FOR THE NEXT 24 HOURS AND YOU MUST MAKE ARRANGEMENTS TO HAVE SOMEONE STAY WITH YOU FOR AT LEAST 24 HOURS AFTER YOUR DISCHARGE.  AVOID HAZARDOUS AND STRENUOUS ACTIVITIES.  DO NOT MAKE IMPORTANT DECISIONS FOR 24 HOURS.    DO NOT DRIVE ANY VEHICLE OR OPERATE MECHANICAL EQUIPMENT FOR 24 HOURS FOLLOWING THE END OF YOUR SURGERY.  EVEN THOUGH YOU MAY FEEL NORMAL, YOUR REACTIONS MAY BE AFFECTED BY THE MEDICATION YOU HAVE RECEIVED.    DO NOT DRINK ALCOHOLIC BEVERAGES FOR 24 HOURS FOLLOWING YOUR SURGERY.    DRINK CLEAR LIQUIDS (APPLE JUICE, GINGER ALE, 7-UP, BROTH, ETC.).  PROGRESS TO YOUR REGULAR DIET AS YOU FEEL ABLE.    YOU MAY HAVE A DRY MOUTH, A SORE THROAT, MUSCLES ACHES OR TROUBLE SLEEPING.  THESE SHOULD GO AWAY AFTER 24 HOURS.    CALL YOUR DOCTOR FOR ANY OF THE FOLLOWING:  SIGNS OF INFECTION (FEVER, GROWING TENDERNESS AT THE SURGERY SITE, A LARGE AMOUNT OF DRAINAGE OR BLEEDING, SEVERE PAIN, FOUL-SMELLING DRAINAGE, REDNESS OR SWELLING.    IT HAS BEEN OVER 8 TO 10 HOURS SINCE SURGERY AND YOU ARE STILL NOT ABLE TO URINATE (PASS WATER).       You received one tablet of Oxycodone at 10:45am.

## 2018-07-18 NOTE — ANESTHESIA POSTPROCEDURE EVALUATION
Patient: Montez Valerio    Procedure(s):  open epigastric hernia repair with mesh - Wound Class: I-Clean    Diagnosis:open epigastric hernia repair with mesh  Diagnosis Additional Information: Pre-operative diagnosis: Epigastric Hernia  Post-operative diagnosis: Same   Procedure: Epigastric Herniorrhaphy with Bard Ventralex ST Hernia Patch (6.4 cm round)         Anesthesia Type:  General, ETT    Note:  Anesthesia Post Evaluation    Patient location during evaluation: PACU  Patient participation: Able to fully participate in evaluation  Level of consciousness: awake  Pain management: adequate  Airway patency: patent  Cardiovascular status: acceptable  Respiratory status: acceptable  Hydration status: acceptable  PONV: controlled     Anesthetic complications: None          Last vitals:  Vitals:    07/18/18 1030 07/18/18 1051 07/18/18 1154   BP: 122/77 128/74 130/72   Resp: 16 16 16   Temp:  97.9  F (36.6  C) 98.2  F (36.8  C)   SpO2: 92% 97%          Electronically Signed By: Anselmo Llanes MD  July 18, 2018  1:22 PM

## 2018-08-06 DIAGNOSIS — E11.9 TYPE 2 DIABETES MELLITUS WITHOUT COMPLICATION, WITHOUT LONG-TERM CURRENT USE OF INSULIN (H): ICD-10-CM

## 2018-08-06 NOTE — TELEPHONE ENCOUNTER
"Requested Prescriptions   Pending Prescriptions Disp Refills     glimepiride (AMARYL) 1 MG tablet [Pharmacy Med Name: GLIMEPIRIDE 1 MG TABLET]  Last Written Prescription Date:  6/28/2018  Last Fill Quantity: 90,  # refills: 1   Last office visit: 7/17/2018 with prescribing provider:     Future Office Visit:   90 tablet 1     Sig: TAKE 1 TABLET BY MOUTH DAILY    Sulfonylurea Agents Passed    8/6/2018  2:19 AM       Passed - Blood pressure less than 140/90 in past 6 months    BP Readings from Last 3 Encounters:   07/18/18 130/72   07/17/18 116/80   07/12/18 138/82                Passed - Patient has documented LDL within the past 12 mos.    Recent Labs   Lab Test  06/27/18   1020   LDL  104*            Passed - Patient has had a Microalbumin in the past 12 mos.    Recent Labs   Lab Test  06/27/18   1020   MICROL  8   UMALCR  10.96            Passed - Patient has documented A1c within the specified period of time.    If HgbA1C is 8 or greater, it needs to be on file within the past 3 months.  If less than 8, must be on file within the past 6 months.     Recent Labs   Lab Test  07/17/18   1033   A1C  8.5*            Passed - Patient is age 18 or older       Passed - Patient has a recent creatinine (normal) within the past 12 mos.    Recent Labs   Lab Test  06/27/18   1020   CR  0.70            Passed - Recent (6 mo) or future (30 days) visit within the authorizing provider's specialty    Patient had office visit in the last 6 months or has a visit in the next 30 days with authorizing provider or within the authorizing provider's specialty.  See \"Patient Info\" tab in inbasket, or \"Choose Columns\" in Meds & Orders section of the refill encounter.            metFORMIN (GLUCOPHAGE-XR) 500 MG 24 hr tablet [Pharmacy Med Name: METFORMIN  MG TABLET]  Last Written Prescription Date:  6/28/2018  Last Fill Quantity: 360,  # refills: 1   Last office visit: 7/17/2018 with prescribing provider:     Future Office Visit:   180 " "tablet 1     Sig: TAKE 1 TABLET BY MOUTH 2 TIMES DAILY    Biguanide Agents Passed    8/6/2018  2:19 AM       Passed - Blood pressure less than 140/90 in past 6 months    BP Readings from Last 3 Encounters:   07/18/18 130/72   07/17/18 116/80   07/12/18 138/82                Passed - Patient has documented LDL within the past 12 mos.    Recent Labs   Lab Test  06/27/18   1020   LDL  104*            Passed - Patient has had a Microalbumin in the past 12 mos.    Recent Labs   Lab Test  06/27/18   1020   MICROL  8   UMALCR  10.96            Passed - Patient is age 10 or older       Passed - Patient has documented A1c within the specified period of time.    If HgbA1C is 8 or greater, it needs to be on file within the past 3 months.  If less than 8, must be on file within the past 6 months.     Recent Labs   Lab Test  07/17/18   1033   A1C  8.5*            Passed - Patient's CR is NOT>1.4 OR Patient's EGFR is NOT<45 within past 12 mos.    Recent Labs   Lab Test  06/27/18   1020   GFRESTIMATED  >90   GFRESTBLACK  >90       Recent Labs   Lab Test  06/27/18   1020   CR  0.70            Passed - Patient does NOT have a diagnosis of CHF.       Passed - Recent (6 mo) or future (30 days) visit within the authorizing provider's specialty    Patient had office visit in the last 6 months or has a visit in the next 30 days with authorizing provider or within the authorizing provider's specialty.  See \"Patient Info\" tab in inbasket, or \"Choose Columns\" in Meds & Orders section of the refill encounter.            "

## 2018-08-07 DIAGNOSIS — E11.9 TYPE 2 DIABETES MELLITUS WITHOUT COMPLICATION, WITHOUT LONG-TERM CURRENT USE OF INSULIN (H): ICD-10-CM

## 2018-08-07 RX ORDER — METFORMIN HCL 500 MG
TABLET, EXTENDED RELEASE 24 HR ORAL
Qty: 180 TABLET | Refills: 1 | OUTPATIENT
Start: 2018-08-07

## 2018-08-07 RX ORDER — GLIMEPIRIDE 1 MG/1
TABLET ORAL
Qty: 90 TABLET | Refills: 1 | OUTPATIENT
Start: 2018-08-07

## 2018-08-07 NOTE — TELEPHONE ENCOUNTER
"Requested Prescriptions   Pending Prescriptions Disp Refills     [id number] (GLUCOPHAGE-XR) 500 MG 24 hr tablet [Pharmacy Med Name: METFORMIN  MG TABLET]  Last Written Prescription Date:  6/28/2018  Last Fill Quantity: 360,  # refills: 1  Last office visit: 7/17/2018 with prescribing provider:     Future Office Visit:   180 tablet 1     Sig: TAKE 1 TABLET BY MOUTH 2 TIMES DAILY    Biguanide Agents Passed    8/7/2018  9:44 AM       Passed - Blood pressure less than 140/90 in past 6 months    BP Readings from Last 3 Encounters:   07/18/18 130/72   07/17/18 116/80   07/12/18 138/82                Passed - Patient has documented LDL within the past 12 mos.    Recent Labs   Lab Test  06/27/18   1020   LDL  104*            Passed - Patient has had a Microalbumin in the past 12 mos.    Recent Labs   Lab Test  06/27/18   1020   MICROL  8   UMALCR  10.96            Passed - Patient is age 10 or older       Passed - Patient has documented A1c within the specified period of time.    If HgbA1C is 8 or greater, it needs to be on file within the past 3 months.  If less than 8, must be on file within the past 6 months.     Recent Labs   Lab Test  07/17/18   1033   A1C  8.5*            Passed - Patient's CR is NOT>1.4 OR Patient's EGFR is NOT<45 within past 12 mos.    Recent Labs   Lab Test  06/27/18   1020   GFRESTIMATED  >90   GFRESTBLACK  >90       Recent Labs   Lab Test  06/27/18   1020   CR  0.70            Passed - Patient does NOT have a diagnosis of CHF.       Passed - Recent (6 mo) or future (30 days) visit within the authorizing provider's specialty    Patient had office visit in the last 6 months or has a visit in the next 30 days with authorizing provider or within the authorizing provider's specialty.  See \"Patient Info\" tab in inbasket, or \"Choose Columns\" in Meds & Orders section of the refill encounter.            glimepiride (AMARYL) 1 MG tablet [Pharmacy Med Name: GLIMEPIRIDE 1 MG TABLET]  Last Written " "Prescription Date:  6/28/2018  Last Fill Quantity: 90,  # refills: 1   Last office visit: 7/17/2018 with prescribing provider:     Future Office Visit:   90 tablet 1     Sig: TAKE 1 TABLET BY MOUTH DAILY    Sulfonylurea Agents Passed    8/7/2018  9:44 AM       Passed - Blood pressure less than 140/90 in past 6 months    BP Readings from Last 3 Encounters:   07/18/18 130/72   07/17/18 116/80   07/12/18 138/82                Passed - Patient has documented LDL within the past 12 mos.    Recent Labs   Lab Test  06/27/18   1020   LDL  104*            Passed - Patient has had a Microalbumin in the past 12 mos.    Recent Labs   Lab Test  06/27/18   1020   MICROL  8   UMALCR  10.96            Passed - Patient has documented A1c within the specified period of time.    If HgbA1C is 8 or greater, it needs to be on file within the past 3 months.  If less than 8, must be on file within the past 6 months.     Recent Labs   Lab Test  07/17/18   1033   A1C  8.5*            Passed - Patient is age 18 or older       Passed - Patient has a recent creatinine (normal) within the past 12 mos.    Recent Labs   Lab Test  06/27/18   1020   CR  0.70            Passed - Recent (6 mo) or future (30 days) visit within the authorizing provider's specialty    Patient had office visit in the last 6 months or has a visit in the next 30 days with authorizing provider or within the authorizing provider's specialty.  See \"Patient Info\" tab in inbasket, or \"Choose Columns\" in Meds & Orders section of the refill encounter.            "

## 2018-12-16 DIAGNOSIS — I10 ESSENTIAL HYPERTENSION WITH GOAL BLOOD PRESSURE LESS THAN 140/90: ICD-10-CM

## 2018-12-17 NOTE — TELEPHONE ENCOUNTER
"Requested Prescriptions   Pending Prescriptions Disp Refills     losartan-hydrochlorothiazide (HYZAAR) 50-12.5 MG tablet [Pharmacy Med Name: LOSARTAN/HCTZ TABS 50/12.5] 90 tablet 1    Last Written Prescription Date:  06/28/2018  Last Fill Quantity: 90,  # refills: 1   Last office visit: 7/17/2018 with prescribing provider:     Future Office Visit:   Sig: TAKE 1 TABLET DAILY    Angiotensin-II Receptors Passed - 12/16/2018  6:52 AM       Passed - Blood pressure under 140/90 in past 12 months    BP Readings from Last 3 Encounters:   07/18/18 130/72   07/17/18 116/80   07/12/18 138/82                Passed - Recent (12 mo) or future (30 days) visit within the authorizing provider's specialty    Patient had office visit in the last 12 months or has a visit in the next 30 days with authorizing provider or within the authorizing provider's specialty.  See \"Patient Info\" tab in inbasket, or \"Choose Columns\" in Meds & Orders section of the refill encounter.             Passed - Patient is age 18 or older       Passed - Normal serum creatinine on file in past 12 months    Recent Labs   Lab Test 06/27/18  1020   CR 0.70            Passed - Normal serum potassium on file in past 12 months    Recent Labs   Lab Test 06/27/18  1020   POTASSIUM 3.8                    "

## 2018-12-20 RX ORDER — LOSARTAN POTASSIUM AND HYDROCHLOROTHIAZIDE 12.5; 5 MG/1; MG/1
TABLET ORAL
Qty: 90 TABLET | Refills: 1 | Status: SHIPPED | OUTPATIENT
Start: 2018-12-20 | End: 2019-02-07

## 2018-12-28 DIAGNOSIS — E11.9 TYPE 2 DIABETES MELLITUS WITHOUT COMPLICATION, WITHOUT LONG-TERM CURRENT USE OF INSULIN (H): ICD-10-CM

## 2018-12-28 NOTE — TELEPHONE ENCOUNTER
"Requested Prescriptions   Pending Prescriptions Disp Refills     metFORMIN (GLUCOPHAGE-XR) 500 MG 24 hr tablet [Pharmacy Med Name: METFORMIN HCL ER TABS 500MG] 360 tablet 1    Last Written Prescription Date:  06/28/2018  Last Fill Quantity: 360,  # refills: 1   Last office visit: 7/17/2018 with prescribing provider:     Future Office Visit:   Sig: TAKE 2 TABLETS TWICE A DAY WITH MEALS    Biguanide Agents Failed - 12/28/2018 12:06 AM       Failed - Patient has documented A1c within the specified period of time.    If HgbA1C is 8 or greater, it needs to be on file within the past 3 months.  If less than 8, must be on file within the past 6 months.     Recent Labs   Lab Test 07/17/18  1033   A1C 8.5*            Passed - Blood pressure less than 140/90 in past 6 months    BP Readings from Last 3 Encounters:   07/18/18 130/72   07/17/18 116/80   07/12/18 138/82                Passed - Patient has documented LDL within the past 12 mos.    Recent Labs   Lab Test 06/27/18  1020   *            Passed - Patient has had a Microalbumin in the past 15 mos.    Recent Labs   Lab Test 06/27/18  1020   MICROL 8   UMALCR 10.96            Passed - Patient is age 10 or older       Passed - Patient's CR is NOT>1.4 OR Patient's EGFR is NOT<45 within past 12 mos.    Recent Labs   Lab Test 06/27/18  1020   GFRESTIMATED >90   GFRESTBLACK >90       Recent Labs   Lab Test 06/27/18  1020   CR 0.70            Passed - Patient does NOT have a diagnosis of CHF.       Passed - Recent (6 mo) or future (30 days) visit within the authorizing provider's specialty    Patient had office visit in the last 6 months or has a visit in the next 30 days with authorizing provider or within the authorizing provider's specialty.  See \"Patient Info\" tab in inbasket, or \"Choose Columns\" in Meds & Orders section of the refill encounter.            "

## 2018-12-28 NOTE — LETTER
[place] [address]  [facility] [address]  [phone]        January 10, 2019      Montez Valerio  [address] [place] [address]          Dear Montez Valerio    APPOINTMENT REMINDER:   Our records indicates that it is time for you to be seen for a recheck.     Your current medication request will be approved for one refill but you will need to be seen before any additional refills can be approved.    Taking care of your health is important to us, and ongoing visits with your provider are vital to your care.    We look forward to seeing you in the near future.  You may call our office at [phone] to schedule a visit.    Please disregard this notice if you have already made an appointment.      Sincerely,        Eliseo Younger MD

## 2019-01-01 RX ORDER — METFORMIN HCL 500 MG
TABLET, EXTENDED RELEASE 24 HR ORAL
Qty: 120 TABLET | Refills: 0 | Status: SHIPPED | OUTPATIENT
Start: 2019-01-01 | End: 2019-02-07

## 2019-01-01 NOTE — TELEPHONE ENCOUNTER
Routing refill request to provider for review/approval because:  Labs out of range:  A1C    Dr. Younger-Please sign if agree.    Thank you!  LUBNA HoffN, RN

## 2019-01-02 NOTE — TELEPHONE ENCOUNTER
Pt is over due for Office visit and labs, I have filled med for 1 month, labs ordered in Epic .please advise labs and appt with me

## 2019-02-01 DIAGNOSIS — E11.9 TYPE 2 DIABETES MELLITUS WITHOUT COMPLICATION, WITHOUT LONG-TERM CURRENT USE OF INSULIN (H): ICD-10-CM

## 2019-02-01 LAB — HBA1C MFR BLD: 7.1 % (ref 0–5.6)

## 2019-02-01 PROCEDURE — 36415 COLL VENOUS BLD VENIPUNCTURE: CPT | Performed by: INTERNAL MEDICINE

## 2019-02-01 PROCEDURE — 83036 HEMOGLOBIN GLYCOSYLATED A1C: CPT | Performed by: INTERNAL MEDICINE

## 2019-02-01 PROCEDURE — 80053 COMPREHEN METABOLIC PANEL: CPT | Performed by: INTERNAL MEDICINE

## 2019-02-01 PROCEDURE — 80061 LIPID PANEL: CPT | Performed by: INTERNAL MEDICINE

## 2019-02-02 LAB
ALBUMIN SERPL-MCNC: 4.1 G/DL (ref 3.4–5)
ALP SERPL-CCNC: 64 U/L (ref 40–150)
ALT SERPL W P-5'-P-CCNC: 39 U/L (ref 0–70)
ANION GAP SERPL CALCULATED.3IONS-SCNC: 7 MMOL/L (ref 3–14)
AST SERPL W P-5'-P-CCNC: 19 U/L (ref 0–45)
BILIRUB SERPL-MCNC: 0.5 MG/DL (ref 0.2–1.3)
BUN SERPL-MCNC: 8 MG/DL (ref 7–30)
CALCIUM SERPL-MCNC: 9 MG/DL (ref 8.5–10.1)
CHLORIDE SERPL-SCNC: 102 MMOL/L (ref 94–109)
CHOLEST SERPL-MCNC: 146 MG/DL
CO2 SERPL-SCNC: 27 MMOL/L (ref 20–32)
CREAT SERPL-MCNC: 0.72 MG/DL (ref 0.66–1.25)
GFR SERPL CREATININE-BSD FRML MDRD: >90 ML/MIN/{1.73_M2}
GLUCOSE SERPL-MCNC: 134 MG/DL (ref 70–99)
HDLC SERPL-MCNC: 39 MG/DL
LDLC SERPL CALC-MCNC: 82 MG/DL
NONHDLC SERPL-MCNC: 107 MG/DL
POTASSIUM SERPL-SCNC: 4 MMOL/L (ref 3.4–5.3)
PROT SERPL-MCNC: 7.2 G/DL (ref 6.8–8.8)
SODIUM SERPL-SCNC: 136 MMOL/L (ref 133–144)
TRIGL SERPL-MCNC: 125 MG/DL

## 2019-02-06 ENCOUNTER — OFFICE VISIT (OUTPATIENT)
Dept: INTERNAL MEDICINE | Facility: CLINIC | Age: 49
End: 2019-02-06
Payer: COMMERCIAL

## 2019-02-06 VITALS
RESPIRATION RATE: 17 BRPM | BODY MASS INDEX: 44.45 KG/M2 | WEIGHT: 283.2 LBS | OXYGEN SATURATION: 97 % | SYSTOLIC BLOOD PRESSURE: 116 MMHG | DIASTOLIC BLOOD PRESSURE: 76 MMHG | HEIGHT: 67 IN | HEART RATE: 107 BPM | TEMPERATURE: 98.4 F

## 2019-02-06 DIAGNOSIS — I10 ESSENTIAL HYPERTENSION WITH GOAL BLOOD PRESSURE LESS THAN 140/90: ICD-10-CM

## 2019-02-06 DIAGNOSIS — E78.5 HYPERLIPIDEMIA LDL GOAL <100: ICD-10-CM

## 2019-02-06 DIAGNOSIS — E11.9 TYPE 2 DIABETES MELLITUS WITHOUT COMPLICATION, WITHOUT LONG-TERM CURRENT USE OF INSULIN (H): ICD-10-CM

## 2019-02-06 PROCEDURE — 99214 OFFICE O/P EST MOD 30 MIN: CPT | Performed by: INTERNAL MEDICINE

## 2019-02-06 ASSESSMENT — MIFFLIN-ST. JEOR: SCORE: 2103.22

## 2019-02-06 NOTE — NURSING NOTE
"/76   Pulse 107   Temp 98.4  F (36.9  C) (Oral)   Resp 17   Ht 1.702 m (5' 7\")   Wt 128.5 kg (283 lb 3.2 oz)   SpO2 97%   BMI 44.36 kg/m    Patient in for follow up on HTN, lipidsand DM2.  Jayne Smith CMA    "

## 2019-02-06 NOTE — PROGRESS NOTES
SUBJECTIVE:   Montez Valerio is a 48 year old male who presents to clinic today for the following health issues:      Diabetes Follow-up    Patient is checking blood sugars: once daily.  Results are as follows:         am - 120-130    Diabetic concerns: None     Symptoms of hypoglycemia (low blood sugar): none     Paresthesias (numbness or burning in feet) or sores: No     Date of last diabetic eye exam: 2018    BP Readings from Last 2 Encounters:   02/06/19 116/76   07/18/18 130/72     Hemoglobin A1C (%)   Date Value   02/01/2019 7.1 (H)   07/17/2018 8.5 (H)     LDL Cholesterol Calculated (mg/dL)   Date Value   02/01/2019 82   06/27/2018 104 (H)         Hyperlipidemia Follow-Up      Rate your low fat/cholesterol diet?: good    Taking statin?  Yes, possible muscle aches from statin    Other lipid medications/supplements?:  none    Hypertension Follow-up      Outpatient blood pressures are not being checked.    Low Salt Diet: no added salt        Amount of exercise or physical activity: None    Problems taking medications regularly: No    Medication side effects: none    Diet: low salt       Patient Active Problem List   Diagnosis     Esophageal reflux     iamdcLUMB/LUMBOSAC DISC DEGEN     Mixed hyperlipidemia     Type 2 diabetes mellitus without complication (H)     HYPERLIPIDEMIA LDL GOAL <100     Morbid obesity (H)     Essential hypertension, benign     SOB (shortness of breath)     Chest pressure     Past Surgical History:   Procedure Laterality Date     ARTHROSCOPY KNEE  2/12/2014    Procedure: ARTHROSCOPY KNEE;  Right Knee Arthroscopy, Removal of Loose Body, Partial Lateral Meniscectomy  ;  Surgeon: Amado Rondon MD;  Location: [facility] OR     AXILLARY SURGERY      Mass removed left     HC KNEE SCOPE, DIAGNOSTIC  1984    Arthroscopy, Knee right     HERNIORRHAPHY EPIGASTRIC N/A 7/18/2018    Procedure: HERNIORRHAPHY EPIGASTRIC;  open epigastric hernia repair with mesh;  Surgeon: Chastity Rios MD;   Location: [facility] OR     LAPAROSCOPIC CHOLECYSTECTOMY  12/21/2012    Procedure: LAPAROSCOPIC CHOLECYSTECTOMY;  LAPAROSCOPIC CHOLECYSTECTOMY;  Surgeon: Alton Riggs MD;  Location: [facility] OR     wisdom teeth[         Social History     Tobacco Use     Smoking status: Former Smoker     Types: Cigars, cigarillos or filtered cigars     Smokeless tobacco: Never Used     Tobacco comment: occ. cigar   Substance Use Topics     Alcohol use: Yes     Comment: 4-6 drinks, 1-2 nights week, beer/liquor     Family History   Problem Relation Age of Onset     Lipids Mother      Cerebrovascular Disease Mother      Lipids Father      Gallbladder Disease Father      Cerebrovascular Disease Father      C.A.D. Maternal Grandfather         MI     C.A.D. Paternal Grandfather         MI     Cancer Paternal Grandmother         Pancreatic     Alcohol/Drug Sister      Psychotic Disorder Sister      Mental Illness Sister      Gallbladder Disease Sister          Current Outpatient Medications   Medication Sig Dispense Refill     blood glucose monitoring (ACCU-CHEK HOWARD PLUS) test strip by In Vitro route daily 100 each 1     blood glucose monitoring (ONE TOUCH DELICA) lancets Use to test blood sugars ONE times daily or as directed. 1 Box 3     blood glucose monitoring (ONE TOUCH VERIO IQ) test strip Use to test blood sugars ONE times daily or as directed. 100 each 3     blood glucose monitoring (ONETOUCH VERIO) meter device kit Use to test blood sugars ONE times daily or as directed. 1 kit 0     cholecalciferol (VITAMIN  -D) 1000 UNITS capsule Take 1 capsule by mouth daily       glimepiride (AMARYL) 1 MG tablet TAKE 1 TABLET DAILY 15 tablet 0     ibuprofen (ADVIL/MOTRIN) 600 MG tablet Take 1 tablet (600 mg) by mouth every 6 hours as needed for pain (mild) 30 tablet 0     losartan-hydrochlorothiazide (HYZAAR) 50-12.5 MG tablet TAKE 1 TABLET DAILY 90 tablet 1     metFORMIN (GLUCOPHAGE-XR) 500 MG 24 hr tablet TAKE 2 TABLETS TWICE A DAY WITH MEALS 120  "tablet 0     simvastatin (ZOCOR) 20 MG tablet Take 1 tablet (20 mg) by mouth daily at bedtime. 90 tablet 3       Reviewed and updated as needed this visit by clinical staff  Tobacco  Allergies  Meds  Med Hx  Surg Hx  Fam Hx  Soc Hx      Reviewed and updated as needed this visit by Provider         ROS:  CONSTITUTIONAL: NEGATIVE for fever, chills, change in weight  EYES: NEGATIVE for vision changes or irritation  ENT/MOUTH: NEGATIVE for ear, mouth and throat problems  RESP: NEGATIVE for significant cough or SOB  CV: NEGATIVE for chest pain, palpitations or peripheral edema  MUSCULOSKELETAL: NEGATIVE for significant arthralgias or myalgia  NEURO: NEGATIVE for weakness, dizziness or paresthesias  ENDOCRINE: NEGATIVE for temperature intolerance, skin/hair changes and Hx diabetes  ROS otherwise negative    OBJECTIVE:                                                    /76   Pulse 107   Temp 98.4  F (36.9  C) (Oral)   Resp 17   Ht 1.702 m (5' 7\")   Wt 128.5 kg (283 lb 3.2 oz)   SpO2 97%   BMI 44.36 kg/m    Body mass index is 44.36 kg/m .   GENERAL: healthy, alert, well nourished, well hydrated, no distress  EYES: Eyes grossly normal to inspection, extraocular movements - intact, and PERRL  HENT: ear canals- normal; TMs- normal; Nose- normal; Mouth- no ulcers, no lesions  NECK: no tenderness, no adenopathy, no asymmetry, no masses, no stiffness; thyroid- normal to palpation  RESP: lungs clear to auscultation - no rales, no rhonchi, no wheezes  CV: regular rates and rhythm, normal S1 S2, no S3 or S4 and no murmur, no click or rub -  MS: extremities- no gross deformities noted, no edema  NEURO: strength and tone- normal, sensory exam- grossly normal, mentation- intact, speech- normal, reflexes- symmetric  Diabetic foot exam: normal DP and PT pulses, no trophic changes or ulcerative lesions, normal sensory exam and normal monofilament exam       ASSESSMENT/PLAN:                                                "        (E11.9) Type 2 diabetes mellitus without complication, without long-term current use of insulin (H)  Comment: Diabetes controlled, A1c 7.1 significantly improved since last time  Plan: Refilled glimepiride (AMARYL) 1 MG tablet, metFORMIN (GLUCOPHAGE-XR) 500 MG 24 hr tablet, as directed.explained clearly about the medication,insructions and side effects.  Continue to follow ADA diet regular exercise and follow-up in 6 months        OPHTHALMOLOGY ADULT REFERRAL      (I10) Essential hypertension with goal blood pressure less than 140/90  Comment: Blood pressure controlled  Plan: losartan-hydrochlorothiazide (HYZAAR) 50-12.5 MG tablet refilled.explained clearly about the medication,insructions and side effects.  Advised to follow low salt diet and exercise and f/u in 6 mths.            (E78.5) Hyperlipidemia LDL goal <100  Plan: LDL at goal, refilled simvastatin (ZOCOR) 20 MG tablet.explained clearly about the medication,insructions and side effects.       Edna Younger MD  [facility]

## 2019-02-07 RX ORDER — LOSARTAN POTASSIUM AND HYDROCHLOROTHIAZIDE 12.5; 5 MG/1; MG/1
1 TABLET ORAL DAILY
Qty: 90 TABLET | Refills: 1 | Status: SHIPPED | OUTPATIENT
Start: 2019-02-07 | End: 2019-08-28

## 2019-02-07 RX ORDER — METFORMIN HCL 500 MG
TABLET, EXTENDED RELEASE 24 HR ORAL
Qty: 180 TABLET | Refills: 1 | Status: SHIPPED | OUTPATIENT
Start: 2019-02-07 | End: 2020-01-13

## 2019-02-07 RX ORDER — SIMVASTATIN 20 MG
20 TABLET ORAL DAILY
Qty: 90 TABLET | Refills: 3 | Status: SHIPPED | OUTPATIENT
Start: 2019-02-07 | End: 2020-01-28

## 2019-02-07 RX ORDER — GLIMEPIRIDE 1 MG/1
1 TABLET ORAL DAILY
Qty: 90 TABLET | Refills: 1 | Status: SHIPPED | OUTPATIENT
Start: 2019-02-07 | End: 2020-01-13

## 2019-03-15 ENCOUNTER — TELEPHONE (OUTPATIENT)
Dept: INTERNAL MEDICINE | Facility: CLINIC | Age: 49
End: 2019-03-15

## 2019-03-15 NOTE — TELEPHONE ENCOUNTER
Panel Management Review      Patient has the following on his problem list:     Diabetes    ASA: Failed    Last A1C  Lab Results   Component Value Date    A1C 7.1 02/01/2019    A1C 8.5 07/17/2018    A1C 8.5 06/27/2018    A1C 7.2 06/28/2017    A1C 6.9 11/08/2016     A1C tested: MONITOR    Last LDL:    Lab Results   Component Value Date    CHOL 146 02/01/2019     Lab Results   Component Value Date    HDL 39 02/01/2019     Lab Results   Component Value Date    LDL 82 02/01/2019     Lab Results   Component Value Date    TRIG 125 02/01/2019     Lab Results   Component Value Date    CHOLHDLRATIO 3.2 08/25/2015     Lab Results   Component Value Date    NHDL 107 02/01/2019       Is the patient on a Statin? YES             Is the patient on Aspirin? YES    Medications     HMG CoA Reductase Inhibitors     simvastatin (ZOCOR) 20 MG tablet       Salicylates     aspirin (ASA) 81 MG tablet             Last three blood pressure readings:  BP Readings from Last 3 Encounters:   02/06/19 116/76   07/18/18 130/72   07/17/18 116/80       Date of last diabetes office visit: 2/6/2019     Tobacco History:     History   Smoking Status     Former Smoker     Types: Cigars, cigarillos or filtered cigars   Smokeless Tobacco     Never Used     Comment: occ. cigar           IVD   ASA: MONITOR    Last LDL:    Lab Results   Component Value Date    CHOL 146 02/01/2019     Lab Results   Component Value Date    HDL 39 02/01/2019     Lab Results   Component Value Date    LDL 82 02/01/2019     Lab Results   Component Value Date    TRIG 125 02/01/2019        Lab Results   Component Value Date    CHOLHDLRATIO 3.2 08/25/2015        Is the patient on a Statin? YES   Is the patient on Aspirin? YES                  Medications     HMG CoA Reductase Inhibitors     simvastatin (ZOCOR) 20 MG tablet       Salicylates     aspirin (ASA) 81 MG tablet             Last three blood pressure readings:  BP Readings from Last 3 Encounters:   02/06/19 116/76   07/18/18  130/72   07/17/18 116/80        Tobacco History:     History   Smoking Status     Former Smoker     Types: Cigars, cigarillos or filtered cigars   Smokeless Tobacco     Never Used     Comment: occ. cigar       Hypertension   Last three blood pressure readings:  BP Readings from Last 3 Encounters:   02/06/19 116/76   07/18/18 130/72   07/17/18 116/80     Blood pressure: MONITOR    HTN Guidelines:  Age 18-59 BP range:  Less than 140/90  Age 60-85 with Diabetes:  Less than 140/90  Age 60-85 without Diabetes:  less than 150/90      Composite cancer screening  Chart review shows that this patient is due/due soon for the following FIT  Summary:    Patient is due/failing the following:   FIT    Action needed:   Patient needs office visit for FIT.    Type of outreach:    Discussed at OV. Kit receieved from lab per pt.    Questions for provider review:    None                                                                                                                                    Jayne Smith CMA       Chart routed to none .

## 2019-08-28 DIAGNOSIS — E11.9 TYPE 2 DIABETES MELLITUS WITHOUT COMPLICATION, WITHOUT LONG-TERM CURRENT USE OF INSULIN (H): ICD-10-CM

## 2019-08-28 DIAGNOSIS — I10 ESSENTIAL HYPERTENSION WITH GOAL BLOOD PRESSURE LESS THAN 140/90: Primary | ICD-10-CM

## 2019-08-29 NOTE — TELEPHONE ENCOUNTER
"Requested Prescriptions   Pending Prescriptions Disp Refills     losartan-hydrochlorothiazide (HYZAAR) 50-12.5 MG tablet [Pharmacy Med Name: LOSARTAN/HCTZ TABS 50/12.5] 90 tablet 4     Sig: TAKE 1 TABLET DAILY   Last Written Prescription Date:  02/07/2019  Last Fill Quantity: 90,  # refills: 01   Last office visit: 2/6/2019 with prescribing provider:     Future Office Visit:      Angiotensin-II Receptors Passed - 8/28/2019 11:53 PM        Passed - Last blood pressure under 140/90 in past 12 months     BP Readings from Last 3 Encounters:   02/06/19 116/76   07/18/18 130/72   07/17/18 116/80                 Passed - Recent (12 mo) or future (30 days) visit within the authorizing provider's specialty     Patient had office visit in the last 12 months or has a visit in the next 30 days with authorizing provider or within the authorizing provider's specialty.  See \"Patient Info\" tab in inbasket, or \"Choose Columns\" in Meds & Orders section of the refill encounter.              Passed - Medication is active on med list        Passed - Patient is age 18 or older        Passed - Normal serum creatinine on file in past 12 months     Recent Labs   Lab Test 02/01/19  0949   CR 0.72             Passed - Normal serum potassium on file in past 12 months     Recent Labs   Lab Test 02/01/19  0949   POTASSIUM 4.0                    "

## 2019-08-30 RX ORDER — LOSARTAN POTASSIUM AND HYDROCHLOROTHIAZIDE 12.5; 5 MG/1; MG/1
TABLET ORAL
Qty: 90 TABLET | Refills: 0 | Status: SHIPPED | OUTPATIENT
Start: 2019-08-30 | End: 2020-01-13

## 2019-08-30 NOTE — TELEPHONE ENCOUNTER
Hyzaar refill request from mail order.     Last OV 2/6/19    Pt due for fasting labs, OV.   Left detailed message to schedule lab and OV appointments.     Provider approval needed for the lab orders. Not sure if needs CMP or BMP?    Lab Results   Component Value Date    A1C 7.1 02/01/2019    A1C 8.5 07/17/2018    A1C 8.5 06/27/2018    A1C 7.2 06/28/2017    A1C 6.9 11/08/2016     Creatinine   Date Value Ref Range Status   02/01/2019 0.72 0.66 - 1.25 mg/dL Final     Potassium   Date Value Ref Range Status   02/01/2019 4.0 3.4 - 5.3 mmol/L Final     Lab Results   Component Value Date    ALT 39 02/01/2019

## 2019-09-29 ENCOUNTER — HEALTH MAINTENANCE LETTER (OUTPATIENT)
Age: 49
End: 2019-09-29

## 2020-01-13 ENCOUNTER — MYC REFILL (OUTPATIENT)
Dept: INTERNAL MEDICINE | Facility: CLINIC | Age: 50
End: 2020-01-13

## 2020-01-13 DIAGNOSIS — E11.9 TYPE 2 DIABETES MELLITUS WITHOUT COMPLICATION, WITHOUT LONG-TERM CURRENT USE OF INSULIN (H): ICD-10-CM

## 2020-01-13 DIAGNOSIS — I10 ESSENTIAL HYPERTENSION WITH GOAL BLOOD PRESSURE LESS THAN 140/90: ICD-10-CM

## 2020-01-16 RX ORDER — GLIMEPIRIDE 1 MG/1
1 TABLET ORAL DAILY
Qty: 30 TABLET | Refills: 0 | Status: SHIPPED | OUTPATIENT
Start: 2020-01-16 | End: 2020-01-28

## 2020-01-16 RX ORDER — METFORMIN HCL 500 MG
TABLET, EXTENDED RELEASE 24 HR ORAL
Qty: 60 TABLET | Refills: 0 | Status: SHIPPED | OUTPATIENT
Start: 2020-01-16 | End: 2020-01-28

## 2020-01-16 RX ORDER — LOSARTAN POTASSIUM AND HYDROCHLOROTHIAZIDE 12.5; 5 MG/1; MG/1
1 TABLET ORAL DAILY
Qty: 30 TABLET | Refills: 0 | Status: SHIPPED | OUTPATIENT
Start: 2020-01-16 | End: 2020-01-28

## 2020-01-16 NOTE — TELEPHONE ENCOUNTER
"Requested Prescriptions   Pending Prescriptions Disp Refills     losartan-hydrochlorothiazide (HYZAAR) 50-12.5 MG tablet 90 tablet 0     Sig: Take 1 tablet by mouth daily       Angiotensin-II Receptors Passed - 1/13/2020  1:37 PM        Passed - Last blood pressure under 140/90 in past 12 months     BP Readings from Last 3 Encounters:   02/06/19 116/76   07/18/18 130/72   07/17/18 116/80                 Passed - Recent (12 mo) or future (30 days) visit within the authorizing provider's specialty     Patient has had an office visit with the authorizing provider or a provider within the authorizing providers department within the previous 12 mos or has a future within next 30 days. See \"Patient Info\" tab in inbasket, or \"Choose Columns\" in Meds & Orders section of the refill encounter.              Passed - Medication is active on med list        Passed - Patient is age 18 or older        Passed - Normal serum creatinine on file in past 12 months     Recent Labs   Lab Test 02/01/19  0949   CR 0.72             Passed - Normal serum potassium on file in past 12 months     Recent Labs   Lab Test 02/01/19  0949   POTASSIUM 4.0                      "

## 2020-01-16 NOTE — TELEPHONE ENCOUNTER
Medication is being filled for 1 time refill only due to:  patient is overdue for an appointment      Next 5 appointments (look out 90 days)    Jan 28, 2020  9:40 AM CST  SHORT with Edna Younger MD  [facility] [address]  [facility] [address]  [phone]

## 2020-01-16 NOTE — TELEPHONE ENCOUNTER
"Requested Prescriptions   Pending Prescriptions Disp Refills     glimepiride (AMARYL) 1 MG tablet 90 tablet 1     Sig: Take 1 tablet (1 mg) by mouth daily       Sulfonylurea Agents Failed - 1/13/2020  1:37 PM        Failed - Blood pressure less than 140/90 in past 6 months     BP Readings from Last 3 Encounters:   02/06/19 116/76   07/18/18 130/72   07/17/18 116/80                 Failed - Patient has had a Microalbumin in the past 15 mos.     Recent Labs   Lab Test 06/27/18  1020   MICROL 8   UMALCR 10.96             Failed - Patient has documented A1c within the specified period of time.     If HgbA1C is 8 or greater, it needs to be on file within the past 3 months.  If less than 8, must be on file within the past 6 months.     Recent Labs   Lab Test 02/01/19  0949   A1C 7.1*             Failed - Recent (6 mo) or future (30 days) visit within the authorizing provider's specialty     Patient had office visit in the last 6 months or has a visit in the next 30 days with authorizing provider or within the authorizing provider's specialty.  See \"Patient Info\" tab in inbasket, or \"Choose Columns\" in Meds & Orders section of the refill encounter.            Passed - Patient has documented LDL within the past 12 mos.     Recent Labs   Lab Test 02/01/19  0949   LDL 82             Passed - Medication is active on med list        Passed - Patient is age 18 or older        Passed - Patient has a recent creatinine (normal) within the past 12 mos.     Recent Labs   Lab Test 02/01/19  0949   CR 0.72             metFORMIN (GLUCOPHAGE-XR) 500 MG 24 hr tablet 180 tablet 1     Sig: TAKE 2 TABLETS TWICE A DAY WITH MEALS       Biguanide Agents Failed - 1/13/2020  1:37 PM        Failed - Blood pressure less than 140/90 in past 6 months     BP Readings from Last 3 Encounters:   02/06/19 116/76   07/18/18 130/72   07/17/18 116/80                 Failed - Patient has had a Microalbumin in the past 15 mos.     Recent Labs   Lab Test " "06/27/18  1020   MICROL 8   UMALCR 10.96             Failed - Patient has documented A1c within the specified period of time.     If HgbA1C is 8 or greater, it needs to be on file within the past 3 months.  If less than 8, must be on file within the past 6 months.     Recent Labs   Lab Test 02/01/19  0949   A1C 7.1*             Failed - Recent (6 mo) or future (30 days) visit within the authorizing provider's specialty     Patient had office visit in the last 6 months or has a visit in the next 30 days with authorizing provider or within the authorizing provider's specialty.  See \"Patient Info\" tab in inbasket, or \"Choose Columns\" in Meds & Orders section of the refill encounter.            Passed - Patient has documented LDL within the past 12 mos.     Recent Labs   Lab Test 02/01/19  0949   LDL 82             Passed - Patient is age 10 or older        Passed - Patient's CR is NOT>1.4 OR Patient's EGFR is NOT<45 within past 12 mos.     Recent Labs   Lab Test 02/01/19  0949   GFRESTIMATED >90   GFRESTBLACK >90       Recent Labs   Lab Test 02/01/19  0949   CR 0.72             Passed - Patient does NOT have a diagnosis of CHF.        Passed - Medication is active on med list          "

## 2020-01-28 ENCOUNTER — OFFICE VISIT (OUTPATIENT)
Dept: INTERNAL MEDICINE | Facility: CLINIC | Age: 50
End: 2020-01-28
Payer: COMMERCIAL

## 2020-01-28 VITALS
HEIGHT: 67 IN | TEMPERATURE: 98.2 F | DIASTOLIC BLOOD PRESSURE: 88 MMHG | OXYGEN SATURATION: 98 % | SYSTOLIC BLOOD PRESSURE: 147 MMHG | RESPIRATION RATE: 20 BRPM | HEART RATE: 87 BPM | WEIGHT: 286.6 LBS | BODY MASS INDEX: 44.98 KG/M2

## 2020-01-28 DIAGNOSIS — I10 ESSENTIAL HYPERTENSION, BENIGN: ICD-10-CM

## 2020-01-28 DIAGNOSIS — E78.5 HYPERLIPIDEMIA LDL GOAL <100: ICD-10-CM

## 2020-01-28 DIAGNOSIS — E11.9 TYPE 2 DIABETES MELLITUS WITHOUT COMPLICATION, WITHOUT LONG-TERM CURRENT USE OF INSULIN (H): Primary | ICD-10-CM

## 2020-01-28 DIAGNOSIS — E66.01 MORBID OBESITY (H): ICD-10-CM

## 2020-01-28 LAB — HBA1C MFR BLD: 8.6 % (ref 0–5.6)

## 2020-01-28 PROCEDURE — 82043 UR ALBUMIN QUANTITATIVE: CPT | Performed by: INTERNAL MEDICINE

## 2020-01-28 PROCEDURE — 84443 ASSAY THYROID STIM HORMONE: CPT | Performed by: INTERNAL MEDICINE

## 2020-01-28 PROCEDURE — 80053 COMPREHEN METABOLIC PANEL: CPT | Performed by: INTERNAL MEDICINE

## 2020-01-28 PROCEDURE — 90471 IMMUNIZATION ADMIN: CPT | Performed by: INTERNAL MEDICINE

## 2020-01-28 PROCEDURE — 99214 OFFICE O/P EST MOD 30 MIN: CPT | Mod: 25 | Performed by: INTERNAL MEDICINE

## 2020-01-28 PROCEDURE — 83036 HEMOGLOBIN GLYCOSYLATED A1C: CPT | Performed by: INTERNAL MEDICINE

## 2020-01-28 PROCEDURE — 99207 C FOOT EXAM  NO CHARGE: CPT | Performed by: INTERNAL MEDICINE

## 2020-01-28 PROCEDURE — 90732 PPSV23 VACC 2 YRS+ SUBQ/IM: CPT | Performed by: INTERNAL MEDICINE

## 2020-01-28 PROCEDURE — 80061 LIPID PANEL: CPT | Performed by: INTERNAL MEDICINE

## 2020-01-28 PROCEDURE — 36415 COLL VENOUS BLD VENIPUNCTURE: CPT | Performed by: INTERNAL MEDICINE

## 2020-01-28 RX ORDER — METFORMIN HCL 500 MG
TABLET, EXTENDED RELEASE 24 HR ORAL
Qty: 180 TABLET | Refills: 0 | Status: SHIPPED | OUTPATIENT
Start: 2020-01-28 | End: 2020-01-28

## 2020-01-28 RX ORDER — LOSARTAN POTASSIUM 50 MG/1
50 TABLET ORAL DAILY
Qty: 90 TABLET | Refills: 0 | Status: SHIPPED | OUTPATIENT
Start: 2020-01-28 | End: 2020-01-28

## 2020-01-28 RX ORDER — SIMVASTATIN 20 MG
20 TABLET ORAL DAILY
Qty: 90 TABLET | Refills: 3 | Status: SHIPPED | OUTPATIENT
Start: 2020-01-28 | End: 2020-01-28

## 2020-01-28 RX ORDER — LOSARTAN POTASSIUM 50 MG/1
50 TABLET ORAL DAILY
Qty: 90 TABLET | Refills: 0 | Status: SHIPPED | OUTPATIENT
Start: 2020-01-28 | End: 2020-04-03

## 2020-01-28 RX ORDER — GLIMEPIRIDE 1 MG/1
1 TABLET ORAL DAILY
Qty: 90 TABLET | Refills: 0 | Status: SHIPPED | OUTPATIENT
Start: 2020-01-28 | End: 2020-04-03

## 2020-01-28 RX ORDER — HYDROCHLOROTHIAZIDE 12.5 MG/1
25 TABLET ORAL DAILY
Qty: 90 TABLET | Refills: 0 | Status: SHIPPED | OUTPATIENT
Start: 2020-01-28 | End: 2020-01-28

## 2020-01-28 RX ORDER — METFORMIN HCL 500 MG
TABLET, EXTENDED RELEASE 24 HR ORAL
Qty: 180 TABLET | Refills: 0 | Status: SHIPPED | OUTPATIENT
Start: 2020-01-28 | End: 2020-03-20

## 2020-01-28 RX ORDER — GLIMEPIRIDE 1 MG/1
1 TABLET ORAL DAILY
Qty: 90 TABLET | Refills: 0 | Status: SHIPPED | OUTPATIENT
Start: 2020-01-28 | End: 2020-01-28

## 2020-01-28 RX ORDER — SIMVASTATIN 20 MG
20 TABLET ORAL DAILY
Qty: 90 TABLET | Refills: 3 | Status: SHIPPED | OUTPATIENT
Start: 2020-01-28 | End: 2020-04-03

## 2020-01-28 RX ORDER — HYDROCHLOROTHIAZIDE 12.5 MG/1
25 TABLET ORAL DAILY
Qty: 90 TABLET | Refills: 0 | Status: SHIPPED | OUTPATIENT
Start: 2020-01-28 | End: 2020-03-20

## 2020-01-28 ASSESSMENT — MIFFLIN-ST. JEOR: SCORE: 2113.64

## 2020-01-28 NOTE — PROGRESS NOTES
Subjective     Montez Valerio is a 49 year old male who presents to clinic today for the following health issues:    HPI     Diabetes Follow-up    How often are you checking your blood sugar? A few times a week, pt was out of meds for 2 months, BS - 200 . meds Restarted 1.5 wk ago   What time of day are you checking your blood sugars (select all that apply)?  Before meals  Have you had any blood sugars above 200?  yes  Have you had any blood sugars below 70?  No    What symptoms do you notice when your blood sugar is low?  None    What concerns do you have today about your diabetes? None     Do you have any of these symptoms? (Select all that apply)  No numbness or tingling in feet.  No redness, sores or blisters on feet.  No complaints of excessive thirst.  No reports of blurry vision.  No significant changes to weight.    Have you had a diabetic eye exam in the last 12 months? No       Hyperlipidemia Follow-Up      Are you regularly taking any medication or supplement to lower your cholesterol?   Yes- statin    Are you having muscle aches or other side effects that you think could be caused by your cholesterol lowering medication?  No      Hypertension Follow-up      Do you check your blood pressure regularly outside of the clinic? No , stopped BP med for 2 months    Are you following a low salt diet? Yes    Are your blood pressures ever more than 140 on the top number (systolic) OR more   than 90 on the bottom number (diastolic), for example 140/90? Yes    BP Readings from Last 2 Encounters:   02/06/19 116/76   07/18/18 130/72     Hemoglobin A1C (%)   Date Value   02/01/2019 7.1 (H)   07/17/2018 8.5 (H)     LDL Cholesterol Calculated (mg/dL)   Date Value   02/01/2019 82   06/27/2018 104 (H)         How many servings of fruits and vegetables do you eat daily?  2-3    On average, how many sweetened beverages do you drink each day (Examples: soda, juice, sweet tea, etc.  Do NOT count diet or artificially sweetened  beverages)?   0    How many days per week do you exercise enough to make your heart beat faster? 3 or less    How many minutes a day do you exercise enough to make your heart beat faster? 9 or less    How many days per week do you miss taking your medication? 0      Patient Active Problem List   Diagnosis     Esophageal reflux     iamdcLUMB/LUMBOSAC DISC DEGEN     Mixed hyperlipidemia     Type 2 diabetes mellitus without complication (H)     HYPERLIPIDEMIA LDL GOAL <100     Morbid obesity (H)     Essential hypertension, benign     SOB (shortness of breath)     Chest pressure     Past Surgical History:   Procedure Laterality Date     ARTHROSCOPY KNEE  2/12/2014    Procedure: ARTHROSCOPY KNEE;  Right Knee Arthroscopy, Removal of Loose Body, Partial Lateral Meniscectomy  ;  Surgeon: Amado Rondon MD;  Location: [facility]     AXILLARY SURGERY      Mass removed left     HC KNEE SCOPE, DIAGNOSTIC  1984    Arthroscopy, Knee right     HERNIORRHAPHY EPIGASTRIC N/A 7/18/2018    Procedure: HERNIORRHAPHY EPIGASTRIC;  open epigastric hernia repair with mesh;  Surgeon: Chastity Rios MD;  Location: [facility] OR     LAPAROSCOPIC CHOLECYSTECTOMY  12/21/2012    Procedure: LAPAROSCOPIC CHOLECYSTECTOMY;  LAPAROSCOPIC CHOLECYSTECTOMY;  Surgeon: Alton Riggs MD;  Location: [facility] OR     wisdom teeth[         Social History     Tobacco Use     Smoking status: Former Smoker     Types: Cigars, cigarillos or filtered cigars     Smokeless tobacco: Never Used     Tobacco comment: occ. cigar   Substance Use Topics     Alcohol use: Yes     Comment: 4-6 drinks, 1-2 nights week, beer/liquor     Family History   Problem Relation Age of Onset     Lipids Mother      Cerebrovascular Disease Mother      Lipids Father      Gallbladder Disease Father      Cerebrovascular Disease Father      C.A.D. Maternal Grandfather         MI     C.A.D. Paternal Grandfather         MI     Cancer Paternal Grandmother         Pancreatic     Alcohol/Drug Sister       Psychotic Disorder Sister      Mental Illness Sister      Gallbladder Disease Sister          Current Outpatient Medications   Medication Sig Dispense Refill     aspirin (ASA) 81 MG tablet Take 1 tablet (81 mg) by mouth daily 90 tablet 3     blood glucose monitoring (ACCU-CHEK HOWARD PLUS) test strip by In Vitro route daily 100 each 1     blood glucose monitoring (ONE TOUCH DELICA) lancets Use to test blood sugars ONE times daily or as directed. 1 Box 3     blood glucose monitoring (ONE TOUCH VERIO IQ) test strip Use to test blood sugars ONE times daily or as directed. 100 each 3     cholecalciferol (VITAMIN  -D) 1000 UNITS capsule Take 1 capsule by mouth daily       glimepiride (AMARYL) 1 MG tablet Take 1 tablet (1 mg) by mouth daily 90 tablet 0     hydrochlorothiazide (HYDRODIURIL) 12.5 MG tablet Take 2 tablets (25 mg) by mouth daily 90 tablet 0     ibuprofen (ADVIL/MOTRIN) 600 MG tablet Take 1 tablet (600 mg) by mouth every 6 hours as needed for pain (mild) 30 tablet 0     losartan (COZAAR) 50 MG tablet Take 1 tablet (50 mg) by mouth daily 90 tablet 0     metFORMIN (GLUCOPHAGE-XR) 500 MG 24 hr tablet TAKE 2 TABLETS TWICE A DAY WITH MEALS 180 tablet 0     simvastatin (ZOCOR) 20 MG tablet Take 1 tablet (20 mg) by mouth daily at bedtime. 90 tablet 3         Reviewed and updated as needed this visit by Provider         Review of Systems   ROS COMP: CONSTITUTIONAL: NEGATIVE for fever, chills, change in weight  EYES: NEGATIVE for vision changes or irritation  ENT/MOUTH: NEGATIVE for ear, mouth and throat problems  RESP: NEGATIVE for significant cough or SOB  CV: NEGATIVE for chest pain, palpitations or peripheral edema  GI;no nausea/vomiting  :negative  MUSCULOSKELETAL: NEGATIVE for significant arthralgias or myalgia  NEURO: NEGATIVE for weakness, dizziness or paresthesias  ENDOCRINE: NEGATIVE for temperature intolerance, skin/hair changes      Objective    BP (!) 147/88   Pulse 87   Temp 98.2  F (36.8  C) (Oral)    "Resp 20   Ht 1.702 m (5' 7\")   Wt 130 kg (286 lb 9.6 oz)   SpO2 98%   BMI 44.89 kg/m    Body mass index is 44.89 kg/m .  Physical Exam   GENERAL: healthy, alert and no distress  EYES: Eyes grossly normal to inspection, PERRL and conjunctivae and sclerae normal  HENT: ear canals and TM's normal, nose and mouth without ulcers or lesions  NECK: no adenopathy, no asymmetry, masses, or scars and thyroid normal to palpation  RESP: lungs clear to auscultation - no rales, rhonchi or wheezes  CV: regular rate and rhythm,  MS: no gross musculoskeletal defects noted, no edema  NEURO: Normal strength and tone, mentation intact and speech normal  Diabetic foot exam: normal DP and PT pulses, no trophic changes or ulcerative lesions, normal sensory exam and normal monofilament exam          Assessment & Plan     (E11.9) Type 2 diabetes mellitus without complication, without long-term current use of insulin (H)  (primary encounter diagnosis)  Plan: A1c 8.6 today , diabetes uncontrolled, was off off diabetic meds for 2 months and restarted 1.5 wks ago, refilled  glimepiride (AMARYL) 1 MG tablet, metFORMIN (GLUCOPHAGE-XR) 500 MG 24 hr tablet  .explained clearly about the medication,insructions and side effects. Rpt A1c in 2 months  OPHTHALMOLOGY ADULT REFERRAL, Comprehensive  metabolic panel, Lipid panel reflex to direct         LDL Fasting, TSH with free T4 reflex,  Hemoglobin A1c, Albumin Random Urine         Quantitative with Creat Ratio,  FOOT EXAM,            (E78.5) Hyperlipidemia LDL goal <100  Plan: simvastatin (ZOCOR) 20 MG tablet, refilled.explained clearly about the medication,insructions and side effects.Check lipid panel             (E66.01) Morbid obesity (H)  Plan:  -Discussed in detail about Diet,calorie intake,and importance of regular exercise,       (I10) Essential hypertension, benign  Plan: BP elevated, has been off of losartan/Hctz since 2 months. Refilled losartan 50 mg and hydrochlorothiazide 12.5 mg " "separately as combination is not available per pharmacy .Advised to follow low salt diet and exercise and f/u in 4 wks.         BMI:   Estimated body mass index is 44.89 kg/m  as calculated from the following:    Height as of this encounter: 1.702 m (5' 7\").    Weight as of this encounter: 130 kg (286 lb 9.6 oz).   Weight management plan: Discussed healthy diet and exercise guidelines        FUTURE APPOINTMENTS:       - Follow-up visit in  4 wks         Edna Younger MD  [facility]        "

## 2020-01-28 NOTE — NURSING NOTE
"BP (!) 147/88   Pulse 87   Temp 98.2  F (36.8  C) (Oral)   Resp 20   Ht 1.702 m (5' 7\")   Wt 130 kg (286 lb 9.6 oz)   SpO2 98%   BMI 44.89 kg/m    Patient in for follow up on HTN, lipids and Dm2.  Jayne Smith, ISATU    "

## 2020-01-29 LAB
ALBUMIN SERPL-MCNC: 4.1 G/DL (ref 3.4–5)
ALP SERPL-CCNC: 66 U/L (ref 40–150)
ALT SERPL W P-5'-P-CCNC: 58 U/L (ref 0–70)
ANION GAP SERPL CALCULATED.3IONS-SCNC: 6 MMOL/L (ref 3–14)
AST SERPL W P-5'-P-CCNC: 25 U/L (ref 0–45)
BILIRUB SERPL-MCNC: 0.4 MG/DL (ref 0.2–1.3)
BUN SERPL-MCNC: 9 MG/DL (ref 7–30)
CALCIUM SERPL-MCNC: 8.9 MG/DL (ref 8.5–10.1)
CHLORIDE SERPL-SCNC: 107 MMOL/L (ref 94–109)
CHOLEST SERPL-MCNC: 188 MG/DL
CO2 SERPL-SCNC: 25 MMOL/L (ref 20–32)
CREAT SERPL-MCNC: 0.65 MG/DL (ref 0.66–1.25)
CREAT UR-MCNC: 45 MG/DL
GFR SERPL CREATININE-BSD FRML MDRD: >90 ML/MIN/{1.73_M2}
GLUCOSE SERPL-MCNC: 180 MG/DL (ref 70–99)
HDLC SERPL-MCNC: 48 MG/DL
LDLC SERPL CALC-MCNC: 117 MG/DL
MICROALBUMIN UR-MCNC: 7 MG/L
MICROALBUMIN/CREAT UR: 16.62 MG/G CR (ref 0–17)
NONHDLC SERPL-MCNC: 140 MG/DL
POTASSIUM SERPL-SCNC: 4.2 MMOL/L (ref 3.4–5.3)
PROT SERPL-MCNC: 7.1 G/DL (ref 6.8–8.8)
SODIUM SERPL-SCNC: 138 MMOL/L (ref 133–144)
TRIGL SERPL-MCNC: 116 MG/DL
TSH SERPL DL<=0.005 MIU/L-ACNC: 2.4 MU/L (ref 0.4–4)

## 2020-02-19 ENCOUNTER — TELEPHONE (OUTPATIENT)
Dept: INTERNAL MEDICINE | Facility: CLINIC | Age: 50
End: 2020-02-19

## 2020-02-19 NOTE — TELEPHONE ENCOUNTER
Panel Management Review      Patient has the following on his problem list:     Diabetes    ASA: unknown    Last A1C  Lab Results   Component Value Date    A1C 8.6 01/28/2020    A1C 7.1 02/01/2019    A1C 8.5 07/17/2018    A1C 8.5 06/27/2018    A1C 7.2 06/28/2017     A1C tested: MONITOR    Last LDL:    Lab Results   Component Value Date    CHOL 188 01/28/2020     Lab Results   Component Value Date    HDL 48 01/28/2020     Lab Results   Component Value Date     01/28/2020     Lab Results   Component Value Date    TRIG 116 01/28/2020     Lab Results   Component Value Date    CHOLHDLRATIO 3.2 08/25/2015     Lab Results   Component Value Date    NHDL 140 01/28/2020       Is the patient on a Statin? YES             Is the patient on Aspirin? YES    Medications     HMG CoA Reductase Inhibitors     simvastatin (ZOCOR) 20 MG tablet       Salicylates     aspirin (ASA) 81 MG tablet             Last three blood pressure readings:  BP Readings from Last 3 Encounters:   01/28/20 (!) 147/88   02/06/19 116/76   07/18/18 130/72       Date of last diabetes office visit: 1/28/2020     Tobacco History:     History   Smoking Status     Former Smoker     Types: Cigars, cigarillos or filtered cigars   Smokeless Tobacco     Never Used     Comment: occ. cigar           IVD   ASA: MONITOR    Last LDL:    Lab Results   Component Value Date    CHOL 188 01/28/2020     Lab Results   Component Value Date    HDL 48 01/28/2020     Lab Results   Component Value Date     01/28/2020     Lab Results   Component Value Date    TRIG 116 01/28/2020        Lab Results   Component Value Date    CHOLHDLRATIO 3.2 08/25/2015        Is the patient on a Statin? YES   Is the patient on Aspirin? YES                  Medications     HMG CoA Reductase Inhibitors     simvastatin (ZOCOR) 20 MG tablet       Salicylates     aspirin (ASA) 81 MG tablet             Last three blood pressure readings:  BP Readings from Last 3 Encounters:   01/28/20 (!) 147/88    02/06/19 116/76   07/18/18 130/72        Tobacco History:     History   Smoking Status     Former Smoker     Types: Cigars, cigarillos or filtered cigars   Smokeless Tobacco     Never Used     Comment: occ. cigar       Hypertension   Last three blood pressure readings:  BP Readings from Last 3 Encounters:   01/28/20 (!) 147/88   02/06/19 116/76   07/18/18 130/72     Blood pressure: MONITOR    HTN Guidelines:  Less than 140/90      Composite cancer screening  Chart review shows that this patient is due/due soon for the following Colonoscopy  Summary:    Patient is due/failing the following:   Diabetic eye exam    Action needed:   Patient needs office visit for diabetic eye exam.    Type of outreach:    discussed at last office visit    Questions for provider review:    None                                                                                                                                    Jayne Smith CMA       Chart routed to none .

## 2020-03-25 DIAGNOSIS — E11.9 TYPE 2 DIABETES MELLITUS WITHOUT COMPLICATION, WITHOUT LONG-TERM CURRENT USE OF INSULIN (H): ICD-10-CM

## 2020-03-26 NOTE — TELEPHONE ENCOUNTER
Please schedule for an appointment with Dr. Younger  The patient should have blood sugars records available when she talks to Dr. Younger    Very high A1c.

## 2020-03-26 NOTE — TELEPHONE ENCOUNTER
"Requested Prescriptions   Pending Prescriptions Disp Refills     glimepiride (AMARYL) 1 MG tablet [Pharmacy Med Name: GLIMEPIRIDE TABS 1MG] 90 tablet 3     Sig: TAKE 1 TABLET DAILY   Last Written Prescription Date:  01/28/2020  Last Fill Quantity: 90,  # refills: 0   Last office visit: 1/28/2020 with prescribing provider:     Future Office Visit:      Sulfonylurea Agents Failed - 3/25/2020 10:02 PM        Failed - Patient has a recent creatinine (normal) within the past 12 mos.     Recent Labs   Lab Test 01/28/20  1014   CR 0.65*       Ok to refill medication if creatinine is low          Passed - Patient has documented A1c within the specified period of time.     If HgbA1C is 8 or greater, it needs to be on file within the past 3 months.  If less than 8, must be on file within the past 6 months.     Recent Labs   Lab Test 01/28/20  1014   A1C 8.6*             Passed - Medication is active on med list        Passed - Patient is age 18 or older        Passed - Recent (6 mo) or future (30 days) visit within the authorizing provider's specialty     Patient had office visit in the last 6 months or has a visit in the next 30 days with authorizing provider or within the authorizing provider's specialty.  See \"Patient Info\" tab in inbasket, or \"Choose Columns\" in Meds & Orders section of the refill encounter.               "

## 2020-03-26 NOTE — TELEPHONE ENCOUNTER
Per Dr. Younger's 1/28/2020 office visit note, patient was to follow-up in 4 weeks. Please advise if virtual appointment needed for follow-up.

## 2020-03-30 NOTE — TELEPHONE ENCOUNTER
Left a voicemail asking patient to call the clinic back. Please advise patient of message below and assist in scheduling.

## 2020-04-02 RX ORDER — GLIMEPIRIDE 1 MG/1
TABLET ORAL
Qty: 90 TABLET | Refills: 3 | OUTPATIENT
Start: 2020-04-02

## 2020-04-02 NOTE — TELEPHONE ENCOUNTER
Medication refused, patient has a telephone visit scheduled for tomorrow. Provider will address medications at that time.

## 2020-04-03 ENCOUNTER — VIRTUAL VISIT (OUTPATIENT)
Dept: INTERNAL MEDICINE | Facility: CLINIC | Age: 50
End: 2020-04-03
Payer: COMMERCIAL

## 2020-04-03 DIAGNOSIS — E11.9 TYPE 2 DIABETES MELLITUS WITHOUT COMPLICATION, WITHOUT LONG-TERM CURRENT USE OF INSULIN (H): ICD-10-CM

## 2020-04-03 DIAGNOSIS — E78.5 HYPERLIPIDEMIA LDL GOAL <100: ICD-10-CM

## 2020-04-03 DIAGNOSIS — I10 ESSENTIAL HYPERTENSION, BENIGN: Primary | ICD-10-CM

## 2020-04-03 PROCEDURE — 99214 OFFICE O/P EST MOD 30 MIN: CPT | Mod: TEL | Performed by: INTERNAL MEDICINE

## 2020-04-03 RX ORDER — GLIMEPIRIDE 1 MG/1
1 TABLET ORAL DAILY
Qty: 90 TABLET | Refills: 1 | Status: SHIPPED | OUTPATIENT
Start: 2020-04-03 | End: 2020-09-30

## 2020-04-03 RX ORDER — SIMVASTATIN 20 MG
20 TABLET ORAL DAILY
Qty: 90 TABLET | Refills: 3 | Status: SHIPPED | OUTPATIENT
Start: 2020-04-03 | End: 2021-03-23

## 2020-04-03 RX ORDER — LOSARTAN POTASSIUM 50 MG/1
50 TABLET ORAL DAILY
Qty: 90 TABLET | Refills: 1 | Status: SHIPPED | OUTPATIENT
Start: 2020-04-03 | End: 2020-09-22

## 2020-04-03 RX ORDER — METFORMIN HCL 500 MG
TABLET, EXTENDED RELEASE 24 HR ORAL
Qty: 180 TABLET | Refills: 1 | Status: SHIPPED | OUTPATIENT
Start: 2020-04-03 | End: 2020-07-28

## 2020-04-03 RX ORDER — HYDROCHLOROTHIAZIDE 25 MG/1
25 TABLET ORAL DAILY
Qty: 90 TABLET | Refills: 1 | Status: SHIPPED | OUTPATIENT
Start: 2020-04-03 | End: 2020-09-08

## 2020-04-03 NOTE — PROGRESS NOTES
Subjective     Montez Valerio is a 50 year old male for phone visit  today for the following health issues:    Phone visit 09;07 am -  09;18 am     [facility]   Diabetes Follow-up    How often are you checking your blood sugar? One time daily, BS- 150s  What time of day are you checking your blood sugars (select all that apply)?  Before meals  Have you had any blood sugars above 200?  No  Have you had any blood sugars below 70?  No    What symptoms do you notice when your blood sugar is low?  None    What concerns do you have today about your diabetes? None     Do you have any of these symptoms? (Select all that apply)  No numbness or tingling in feet.  No redness, sores or blisters on feet.  No complaints of excessive thirst.  No reports of blurry vision.  No significant changes to weight.    Have you had a diabetic eye exam in the last 12 months? No       Hyperlipidemia Follow-Up      Are you regularly taking any medication or supplement to lower your cholesterol?  Statin    Are you having muscle aches or other side effects that you think could be caused by your cholesterol lowering medication?  No    Hypertension Follow-up      Do you check your blood pressure regularly outside of the clinic? Yes,  BP -130/70s    Are you following a low salt diet? Yes    Are your blood pressures ever more than 140 on the top number (systolic) OR more   than 90 on the bottom number (diastolic), for example 140/90? No    BP Readings from Last 2 Encounters:   01/28/20 (!) 147/88   02/06/19 116/76     Hemoglobin A1C (%)   Date Value   01/28/2020 8.6 (H)   02/01/2019 7.1 (H)     LDL Cholesterol Calculated (mg/dL)   Date Value   01/28/2020 117 (H)   02/01/2019 82         How many servings of fruits and vegetables do you eat daily?  4 or more    On average, how many sweetened beverages do you drink each day (Examples: soda, juice, sweet tea, etc.  Do NOT count diet or artificially sweetened beverages)?   0    How many days per week do you  exercise enough to make your heart beat faster? 3 or less    How many minutes a day do you exercise enough to make your heart beat faster? 9 or less    How many days per week do you miss taking your medication? 0        Patient Active Problem List   Diagnosis     Esophageal reflux     iamdcLUMB/LUMBOSAC DISC DEGEN     Mixed hyperlipidemia     Type 2 diabetes mellitus without complication (H)     HYPERLIPIDEMIA LDL GOAL <100     Morbid obesity (H)     Essential hypertension, benign     SOB (shortness of breath)     Chest pressure     Past Surgical History:   Procedure Laterality Date     ARTHROSCOPY KNEE  2/12/2014    Procedure: ARTHROSCOPY KNEE;  Right Knee Arthroscopy, Removal of Loose Body, Partial Lateral Meniscectomy  ;  Surgeon: Amado Rondon MD;  Location: [facility] OR     AXILLARY SURGERY      Mass removed left     HC KNEE SCOPE, DIAGNOSTIC  1984    Arthroscopy, Knee right     HERNIORRHAPHY EPIGASTRIC N/A 7/18/2018    Procedure: HERNIORRHAPHY EPIGASTRIC;  open epigastric hernia repair with mesh;  Surgeon: Chastity Rios MD;  Location: [facility] OR     LAPAROSCOPIC CHOLECYSTECTOMY  12/21/2012    Procedure: LAPAROSCOPIC CHOLECYSTECTOMY;  LAPAROSCOPIC CHOLECYSTECTOMY;  Surgeon: Alton Riggs MD;  Location: [facility] OR     wisdom teeth[         Social History     Tobacco Use     Smoking status: Former Smoker     Types: Cigars, cigarillos or filtered cigars     Smokeless tobacco: Never Used     Tobacco comment: occ. cigar   Substance Use Topics     Alcohol use: Yes     Comment: 4-6 drinks, 1-2 nights week, beer/liquor     Family History   Problem Relation Age of Onset     Lipids Mother      Cerebrovascular Disease Mother      Lipids Father      Gallbladder Disease Father      Cerebrovascular Disease Father      C.A.D. Maternal Grandfather         MI     C.A.D. Paternal Grandfather         MI     Cancer Paternal Grandmother         Pancreatic     Alcohol/Drug Sister      Psychotic Disorder Sister      Mental Illness  Sister      Gallbladder Disease Sister          Current Outpatient Medications   Medication Sig Dispense Refill     aspirin (ASA) 81 MG tablet Take 1 tablet (81 mg) by mouth daily 90 tablet 3     blood glucose monitoring (ACCU-CHEK HOWARD PLUS) test strip by In Vitro route daily 100 each 1     blood glucose monitoring (ONE TOUCH DELICA) lancets Use to test blood sugars ONE times daily or as directed. 1 Box 3     blood glucose monitoring (ONE TOUCH VERIO IQ) test strip Use to test blood sugars ONE times daily or as directed. 100 each 3     cholecalciferol (VITAMIN  -D) 1000 UNITS capsule Take 1 capsule by mouth daily       glimepiride (AMARYL) 1 MG tablet Take 1 tablet (1 mg) by mouth daily 90 tablet 0     hydrochlorothiazide (HYDRODIURIL) 12.5 MG tablet TAKE 2 TABLETS (25 MG) BY MOUTH DAILY 60 tablet 1     ibuprofen (ADVIL/MOTRIN) 600 MG tablet Take 1 tablet (600 mg) by mouth every 6 hours as needed for pain (mild) 30 tablet 0     losartan (COZAAR) 50 MG tablet Take 1 tablet (50 mg) by mouth daily 90 tablet 0     metFORMIN (GLUCOPHAGE-XR) 500 MG 24 hr tablet TAKE 2 TABLETS BY MOUTH TWICE A DAY WITH MEALS 120 tablet 1     simvastatin (ZOCOR) 20 MG tablet Take 1 tablet (20 mg) by mouth daily at bedtime. 90 tablet 3         Reviewed and updated as needed this visit by Provider         Review of Systems   ROS COMP: CONSTITUTIONAL: NEGATIVE for fever, chills, change in weight  EYES: NEGATIVE for vision changes or irritation  ENT/MOUTH: NEGATIVE for ear, mouth and throat problems  RESP: NEGATIVE for significant cough or SOB  CV: NEGATIVE for chest pain, palpitations or peripheral edema  GI: NEGATIVE for nausea, abdominal pain, heartburn, or change in bowel habits  NEURO: NEGATIVE for weakness, dizziness or paresthesias  ENDOCRINE: NEGATIVE for temperature intolerance, skin/hair changes       Assessment & Plan     (I10) Essential hypertension, benign  (primary encounter diagnosis)  Plan: Continue losartan (COZAAR) 50 MG  tablet, and hydrochlorothiazide (HYDRODIURIL) 25 MG tablet as directed.explained clearly about the medication,insructions and side effects.  Advised to follow low salt diet and exercise and f/u in 6 mths.          (E11.9) Type 2 diabetes mellitus without complication, without long-term current use of insulin (H)  Plan: glimepiride (AMARYL) 1 MG tablet, metFORMIN (GLUCOPHAGE-XR)        500 MG 24 hr tablet, refilled as directed .explained clearly about the medication,insructions and side effects.Check hemoglobin A1c, Basic         metabolic panel            (E78.5) Hyperlipidemia LDL goal <100  Plan: simvastatin (ZOCOR) 20 MG tablet refilled as directed.explained clearly about the medication,insructions and side effects.  Check Lipid panel reflex to direct LDL Fasting               Edna Younger MD  [facility]

## 2020-06-22 ENCOUNTER — VIRTUAL VISIT (OUTPATIENT)
Dept: INTERNAL MEDICINE | Facility: CLINIC | Age: 50
End: 2020-06-22
Payer: COMMERCIAL

## 2020-06-22 DIAGNOSIS — J32.0 MAXILLARY SINUSITIS, UNSPECIFIED CHRONICITY: Primary | ICD-10-CM

## 2020-06-22 PROCEDURE — 99214 OFFICE O/P EST MOD 30 MIN: CPT | Mod: GT | Performed by: INTERNAL MEDICINE

## 2020-06-22 RX ORDER — CEFDINIR 300 MG/1
300 CAPSULE ORAL 2 TIMES DAILY
Qty: 20 CAPSULE | Refills: 0 | Status: SHIPPED | OUTPATIENT
Start: 2020-06-22 | End: 2020-10-19

## 2020-06-22 NOTE — PROGRESS NOTES
"Montez Valerio is a 50 year old male who is being evaluated via a billable video visit.      The patient has been notified of following:     \"This video visit will be conducted via a call between you and your physician/provider. We have found that certain health care needs can be provided without the need for an in-person physical exam.  This service lets us provide the care you need with a video conversation.  If a prescription is necessary we can send it directly to your pharmacy.  If lab work is needed we can place an order for that and you can then stop by our lab to have the test done at a later time.    Video visits are billed at different rates depending on your insurance coverage.  Please reach out to your insurance provider with any questions.    If during the course of the call the physician/provider feels a video visit is not appropriate, you will not be charged for this service.\"    Patient has given verbal consent for Video visit? Yes    Will anyone else be joining your video visit? No      Subjective     Montez Valerio is a 50 year old male who presents today via video visit for the following health issues:    \[facility]\""  Video Start Time: 1:32 PM    Pt is a 50 year old male presents for video visit  to day with c/o sinus pressure since 4 wks, nasasl congestion,abnormal smell , has nasal drainage and PND , no fever/chills. No cough or wheezing. No ear pain or pressure. Not taking any OTC meds for this       Patient Active Problem List   Diagnosis     Esophageal reflux     iamdcLUMB/LUMBOSAC DISC DEGEN     Mixed hyperlipidemia     Type 2 diabetes mellitus without complication (H)     HYPERLIPIDEMIA LDL GOAL <100     Morbid obesity (H)     Essential hypertension, benign     SOB (shortness of breath)     Chest pressure     Past Surgical History:   Procedure Laterality Date     ARTHROSCOPY KNEE  2/12/2014    Procedure: ARTHROSCOPY KNEE;  Right Knee Arthroscopy, Removal of Loose Body, Partial Lateral Meniscectomy  ; "  Surgeon: Amado Rondon MD;  Location: [facility] OR     AXILLARY SURGERY      Mass removed left     HC KNEE SCOPE, DIAGNOSTIC  1984    Arthroscopy, Knee right     HERNIORRHAPHY EPIGASTRIC N/A 7/18/2018    Procedure: HERNIORRHAPHY EPIGASTRIC;  open epigastric hernia repair with mesh;  Surgeon: Chastity Rios MD;  Location: [facility] OR     LAPAROSCOPIC CHOLECYSTECTOMY  12/21/2012    Procedure: LAPAROSCOPIC CHOLECYSTECTOMY;  LAPAROSCOPIC CHOLECYSTECTOMY;  Surgeon: Alton Riggs MD;  Location: [facility] OR     wisdom teeth[         Social History     Tobacco Use     Smoking status: Former Smoker     Types: Cigars, cigarillos or filtered cigars     Smokeless tobacco: Never Used     Tobacco comment: occ. cigar   Substance Use Topics     Alcohol use: Yes     Comment: 4-6 drinks, 1-2 nights week, beer/liquor     Family History   Problem Relation Age of Onset     Lipids Mother      Cerebrovascular Disease Mother      Lipids Father      Gallbladder Disease Father      Cerebrovascular Disease Father      C.A.D. Maternal Grandfather         MI     C.A.D. Paternal Grandfather         MI     Cancer Paternal Grandmother         Pancreatic     Alcohol/Drug Sister      Psychotic Disorder Sister      Mental Illness Sister      Gallbladder Disease Sister          Current Outpatient Medications   Medication Sig Dispense Refill     blood glucose monitoring (ACCU-CHEK HOWARD PLUS) test strip by In Vitro route daily 100 each 1     blood glucose monitoring (ONE TOUCH DELICA) lancets Use to test blood sugars ONE times daily or as directed. 1 Box 3     blood glucose monitoring (ONE TOUCH VERIO IQ) test strip Use to test blood sugars ONE times daily or as directed. 100 each 3     cholecalciferol (VITAMIN  -D) 1000 UNITS capsule Take 1 capsule by mouth daily       glimepiride (AMARYL) 1 MG tablet Take 1 tablet (1 mg) by mouth daily 90 tablet 1     hydrochlorothiazide (HYDRODIURIL) 25 MG tablet Take 1 tablet (25 mg) by mouth daily 90 tablet 1      "ibuprofen (ADVIL/MOTRIN) 600 MG tablet Take 1 tablet (600 mg) by mouth every 6 hours as needed for pain (mild) 30 tablet 0     losartan (COZAAR) 50 MG tablet Take 1 tablet (50 mg) by mouth daily 90 tablet 1     metFORMIN (GLUCOPHAGE-XR) 500 MG 24 hr tablet TAKE 2 TABLETS BY MOUTH TWICE A DAY WITH MEALS 180 tablet 1     simvastatin (ZOCOR) 20 MG tablet Take 1 tablet (20 mg) by mouth daily at bedtime. 90 tablet 3       Reviewed and updated as needed this visit by Provider         Review of Systems   CONSTITUTIONAL: NEGATIVE for fever, chills, change in weight  EYES: NEGATIVE for vision changes or irritation  ENT/MOUTH: sinus pressure,nasal congestion  RESP: NEGATIVE for significant cough or SOB  CV: NEGATIVE for chest pain, palpitations or peripheral edema      Objective    There were no vitals taken for this visit.  Estimated body mass index is 44.89 kg/m  as calculated from the following:    Height as of 1/28/20: 1.702 m (5' 7\").    Weight as of 1/28/20: 130 kg (286 lb 9.6 oz).  Physical Exam     GENERAL: Healthy, alert and no distress  EYES: Eyes grossly normal to inspection.  No discharge or erythema, or obvious scleral/conjunctival abnormalities.  RESP: No audible wheeze, cough, or visible cyanosis.  No visible retractions or increased work of breathing.    SKIN: Visible skin clear.    NEURO:   Mentation and speech appropriate for age.  PSYCH: Mentation appears normal, affect normal/bright, judgement and insight intact, normal speech and appearance well-groomed.      Diagnostic Test Results:  Labs reviewed in Epic        Assessment & Plan     (J32.0) Maxillary sinusitis, unspecified chronicity  (primary encounter diagnosis)  Plan: cefdinir (OMNICEF) 300 MG capsule bid as directed.explained clearly about the medication,insructions and side effects. Call or return to clinic prn if these symtoms worsen, fail to improve as anticipated, or if new symptoms develop.       Return in about 4 months (around " 10/22/2020).    Edna Younger MD  [facility]      Video-Visit Details    Type of service:  Video Visit    Video End Time:1:44 PM    Originating Location (pt. Location): Home    Distant Location (provider location):  [facility]     Platform used for Video Visit: [employer]     Edna Younger MD

## 2020-07-24 DIAGNOSIS — E11.9 TYPE 2 DIABETES MELLITUS WITHOUT COMPLICATION, WITHOUT LONG-TERM CURRENT USE OF INSULIN (H): ICD-10-CM

## 2020-07-28 RX ORDER — METFORMIN HCL 500 MG
TABLET, EXTENDED RELEASE 24 HR ORAL
Qty: 120 TABLET | Refills: 0 | Status: SHIPPED | OUTPATIENT
Start: 2020-07-28 | End: 2020-10-19

## 2020-07-28 NOTE — TELEPHONE ENCOUNTER
Pt was advised to rpt A1c in 3/20.  Please advise lab only A1c and lipids , metformin faxed for 1month  only . Pl advise pt

## 2020-09-06 DIAGNOSIS — I10 ESSENTIAL HYPERTENSION, BENIGN: ICD-10-CM

## 2020-09-08 RX ORDER — HYDROCHLOROTHIAZIDE 25 MG/1
TABLET ORAL
Qty: 90 TABLET | Refills: 0 | Status: SHIPPED | OUTPATIENT
Start: 2020-09-08 | End: 2020-10-19

## 2020-09-08 NOTE — TELEPHONE ENCOUNTER
Routing refill request to provider for review/approval because:  Labs out of range:  Creat  BP elevated > [facility] protocol for RN refill

## 2020-09-09 NOTE — TELEPHONE ENCOUNTER
Med refilled , Pt is due for diabetic labs and appt.  Orders in epic , pl advise pt to f/u with me in 10/20 when I am in clinic

## 2020-09-12 ENCOUNTER — OFFICE VISIT (OUTPATIENT)
Dept: URGENT CARE | Facility: URGENT CARE | Age: 50
End: 2020-09-12
Payer: COMMERCIAL

## 2020-09-12 VITALS — HEART RATE: 42 BPM | DIASTOLIC BLOOD PRESSURE: 84 MMHG | SYSTOLIC BLOOD PRESSURE: 132 MMHG | TEMPERATURE: 99.2 F

## 2020-09-12 DIAGNOSIS — J02.0 STREP THROAT: ICD-10-CM

## 2020-09-12 DIAGNOSIS — R07.0 THROAT PAIN: Primary | ICD-10-CM

## 2020-09-12 LAB
DEPRECATED S PYO AG THROAT QL EIA: POSITIVE
SPECIMEN SOURCE: ABNORMAL

## 2020-09-12 PROCEDURE — U0003 INFECTIOUS AGENT DETECTION BY NUCLEIC ACID (DNA OR RNA); SEVERE ACUTE RESPIRATORY SYNDROME CORONAVIRUS 2 (SARS-COV-2) (CORONAVIRUS DISEASE [COVID-19]), AMPLIFIED PROBE TECHNIQUE, MAKING USE OF HIGH THROUGHPUT TECHNOLOGIES AS DESCRIBED BY CMS-2020-01-R: HCPCS | Performed by: FAMILY MEDICINE

## 2020-09-12 PROCEDURE — 99213 OFFICE O/P EST LOW 20 MIN: CPT | Performed by: FAMILY MEDICINE

## 2020-09-12 PROCEDURE — 87880 STREP A ASSAY W/OPTIC: CPT | Performed by: FAMILY MEDICINE

## 2020-09-12 RX ORDER — AMOXICILLIN 875 MG
875 TABLET ORAL 2 TIMES DAILY
Qty: 20 TABLET | Refills: 0 | Status: SHIPPED | OUTPATIENT
Start: 2020-09-12 | End: 2020-09-22

## 2020-09-12 NOTE — PROGRESS NOTES
SUBJECTIVE:  Chief Complaint   Patient presents with     Urgent Care     Pharyngitis     Possible strep x3 days- sore throat, myalgia. Denies fever, loss of taste or smell     Montez Valerio is a 50 year old male with a chief complaint of sore throat.  Onset of symptoms was 3 day(s) ago.    Course of illness: sudden onset, still present and constant.  Severity moderate  Current and Associated symptoms: sore throat  Treatment measures tried include None tried.  Predisposing factors include None.  Employer wants him tested.  No exposure to children    Past Medical History:   Diagnosis Date     Arthritis     Neck     Diabetes mellitus (H)     type 2     Dyspnea on exertion      Gastro-oesophageal reflux disease      Hypertension      Other chronic pain     neck pain     PONV (postoperative nausea and vomiting)      Sleep apnea     CPAP     SOB (shortness of breath) 4/26/2017     Patient Active Problem List   Diagnosis     Esophageal reflux     iamdcLUMB/LUMBOSAC DISC DEGEN     Mixed hyperlipidemia     Type 2 diabetes mellitus without complication (H)     HYPERLIPIDEMIA LDL GOAL <100     Morbid obesity (H)     Essential hypertension, benign     SOB (shortness of breath)     Chest pressure         ALLERGIES:  No known drug allergies    MEDs  cholecalciferol (VITAMIN  -D) 1000 UNITS capsule, Take 1 capsule by mouth daily  glimepiride (AMARYL) 1 MG tablet, Take 1 tablet (1 mg) by mouth daily  hydrochlorothiazide (HYDRODIURIL) 25 MG tablet, TAKE 1 TABLET DAILY  losartan (COZAAR) 50 MG tablet, Take 1 tablet (50 mg) by mouth daily  metFORMIN (GLUCOPHAGE-XR) 500 MG 24 hr tablet, TAKE 2 TABLETS TWICE A DAY WITH MEALS  simvastatin (ZOCOR) 20 MG tablet, Take 1 tablet (20 mg) by mouth daily at bedtime.  blood glucose monitoring (ACCU-CHEK HOWARD PLUS) test strip, by In Vitro route daily  blood glucose monitoring (ONE TOUCH DELICA) lancets, Use to test blood sugars ONE times daily or as directed.  blood glucose monitoring (ONE TOUCH  VERIO IQ) test strip, Use to test blood sugars ONE times daily or as directed.  cefdinir (OMNICEF) 300 MG capsule, Take 1 capsule (300 mg) by mouth 2 times daily (Patient not taking: Reported on 9/12/2020)  ibuprofen (ADVIL/MOTRIN) 600 MG tablet, Take 1 tablet (600 mg) by mouth every 6 hours as needed for pain (mild)    No current facility-administered medications on file prior to visit.       Social History     Tobacco Use     Smoking status: Former Smoker     Types: Cigars, cigarillos or filtered cigars     Smokeless tobacco: Never Used     Tobacco comment: occ. cigar   Substance Use Topics     Alcohol use: Yes     Comment: 4-6 drinks, 1-2 nights week, beer/liquor       Family History   Problem Relation Age of Onset     Lipids Mother      Cerebrovascular Disease Mother      Lipids Father      Gallbladder Disease Father      Cerebrovascular Disease Father      C.A.D. Maternal Grandfather         MI     C.A.D. Paternal Grandfather         MI     Cancer Paternal Grandmother         Pancreatic     Alcohol/Drug Sister      Psychotic Disorder Sister      Mental Illness Sister      Gallbladder Disease Sister          ROS:  CONSTITUTIONAL:NEGATIVE for fever, chills,   INTEGUMENTARY/SKIN: NEGATIVE for worrisome rashes  or lesions  EYES: NEGATIVE for vision changes or irritation  RESP:NEGATIVE for significant cough or SOB  GI: NEGATIVE for nausea, abdominal pain, or change in bowel habits    OBJECTIVE:   /84 (BP Location: Right arm, Patient Position: Chair, Cuff Size: Adult Large)   Pulse (!) 42   Temp 99.2  F (37.3  C) (Oral)   GENERAL APPEARANCE: healthy, alert and no distress  EYES: EOMI,  PERRL, conjunctiva clear  HENT: ear canals and TM's normal.  Nose normal.  Pharynx erythematous with some exudate noted.  NECK: supple, non-tender to palpation, no adenopathy noted  RESP: lungs clear to auscultation - no rales, rhonchi or wheezes  CV: regular rates and rhythm, normal S1 S2, no murmur noted  ABDOMEN:  soft,  nontender, no HSM or masses and bowel sounds normal  SKIN: no suspicious lesions or rashes    Rapid Strep test is positive    ASSESSMENT:  Throat pain     - Streptococcus A Rapid Scr w Reflx to PCR  - Symptomatic COVID-19 Virus (Coronavirus) by PCR    Strep throat     - amoxicillin (AMOXIL) 875 MG tablet; Take 1 tablet (875 mg) by mouth 2 times daily for 10 days     Patient was counseled that to prevent spreading the strep infection that he should stay out of public places, work or school until he has completed 24 hours of antibiotic treatment     Symptomatic treat with gargles, lozenges, and OTC analgesic as needed. Follow-up with primary clinic if not improving.

## 2020-09-12 NOTE — PATIENT INSTRUCTIONS
Patient Education     Pharyngitis: Strep (Confirmed)    You have had a positive test for strep throat. Strep throat is a contagious illness. It is spread by coughing, kissing or by touching others after touching your mouth or nose. Symptoms include throat pain that is worse with swallowing, aching all over, headache, and fever. It is treated with antibiotic medicine. This should help you start to feel better in 1 to 2 days.  Home care    Rest at home. Drink plenty of fluids to you won't get dehydrated.    No work or school for the first 2 days of taking the antibiotics. After this time, you will not be contagious. You can then return to school or work if you are feeling better.     Take antibiotic medicine for the full 10 days, even if you feel better. This is very important to ensure the infection is treated. It is also important to prevent medicine-resistant germs from developing. If you were given an antibiotic shot, you don't need any more antibiotics.    You may use acetaminophen or ibuprofen to control pain or fever, unless another medicine was prescribed for this. Talk with your healthcare provider before taking these medicines if you have chronic liver or kidney disease. Also talk with your healthcare provider if you have had a stomach ulcer or GI bleeding.    Throat lozenges or sprays help reduce pain. Gargling with warm saltwater will also reduce throat pain. Dissolve 1/2 teaspoon of salt in 1 glass of warm water. This may be useful just before meals.     Soft foods are OK. Don't eat salty or spicy foods.  Follow-up care  Follow up with your healthcare provider or our staff if you don't get better over the next week.  When to seek medical advice  Call your healthcare provider right away if any of these occur:    Fever of 100.4 F (38 C) or higher, or as directed by your healthcare provider    New or worsening ear pain, sinus pain, or headache    Painful lumps in the back of neck    Stiff neck    Lymph  nodes getting larger or becoming soft in the middle    You can't swallow liquids or you can't open your mouth wide because of throat pain    Signs of dehydration. These include very dark urine or no urine, sunken eyes, and dizziness.    Trouble breathing or noisy breathing    Muffled voice    Rash  Prevention  Here are steps you can take to help prevent an infection:    Keep good hand washing habits.    Don t have close contact with people who have sore throats, colds, or other upper respiratory infections.    Don t smoke, and stay away from secondhand smoke.  Date Last Reviewed: 11/1/2017 2000-2019 [employer]. [address] [place] [address], [place] [address]. All rights reserved. This information is not intended as a substitute for professional medical care. Always follow your healthcare professional's instructions.

## 2020-09-13 LAB
SARS-COV-2 RNA SPEC QL NAA+PROBE: NOT DETECTED
SPECIMEN SOURCE: NORMAL

## 2020-09-18 DIAGNOSIS — E11.9 TYPE 2 DIABETES MELLITUS WITHOUT COMPLICATION, WITHOUT LONG-TERM CURRENT USE OF INSULIN (H): ICD-10-CM

## 2020-09-18 NOTE — LETTER
September 28, 2020      Montez Valerio  [address] [place] [address]        Dear ,    We have received a refill request from your pharmacy for your medication -  Many medications require routine follow-up with your provider and a review of your chart indicates that you are due for labs and office visit. We have sent a one time, 30 day refill to your pharmacy to allow you time to schedule an appointment.     Please call [phone] to schedule an appointment.  We look forward to seeing you in the near future.      Thank you,     [facility]  If you have any questions or concerns, please call the clinic at the number listed above.       Sincerely,        Edna Younger MD

## 2020-09-22 RX ORDER — LOSARTAN POTASSIUM 50 MG/1
TABLET ORAL
Qty: 30 TABLET | Refills: 0 | Status: SHIPPED | OUTPATIENT
Start: 2020-09-22 | End: 2020-10-19

## 2020-09-22 NOTE — TELEPHONE ENCOUNTER
Patient is over due for labs and also office visit,r niall for 1 month,  please advise appointment next month, I am working in clinic next month the week of 10/19/20- 10/23/20

## 2020-09-22 NOTE — TELEPHONE ENCOUNTER
Routing refill request to provider for review/approval because:  Labs out of range:  Creatinine

## 2020-09-29 DIAGNOSIS — E11.9 TYPE 2 DIABETES MELLITUS WITHOUT COMPLICATION, WITHOUT LONG-TERM CURRENT USE OF INSULIN (H): ICD-10-CM

## 2020-09-30 RX ORDER — GLIMEPIRIDE 1 MG/1
TABLET ORAL
Qty: 30 TABLET | Refills: 0 | Status: SHIPPED | OUTPATIENT
Start: 2020-09-30 | End: 2020-10-19

## 2020-09-30 NOTE — TELEPHONE ENCOUNTER
Over due for labs and OV.med filled for 1 month only . Needs labs and appt for further refills. I have openings on 10/23/20.  Labs in epic      Please see my message below from 09/22/20.    Me           6:35 PM   Note      Patient is over due for labs and also office visit,r efilled for 1 month,  please advise appointment next month, I am working in clinic next month the week of 10/19/20- 10/23/20

## 2020-09-30 NOTE — TELEPHONE ENCOUNTER
Patient scheduled an appointment    Next 5 appointments (look out 90 days)    Oct 19, 2020  7:00 AM CDT  SHORT with Edna Younger MD  [facility] ([facility]) [address]  [facility] [id number] [phone]

## 2020-10-15 DIAGNOSIS — E78.5 HYPERLIPIDEMIA LDL GOAL <100: ICD-10-CM

## 2020-10-15 DIAGNOSIS — E11.9 TYPE 2 DIABETES MELLITUS WITHOUT COMPLICATION, WITHOUT LONG-TERM CURRENT USE OF INSULIN (H): ICD-10-CM

## 2020-10-15 DIAGNOSIS — I10 ESSENTIAL HYPERTENSION, BENIGN: ICD-10-CM

## 2020-10-15 LAB — HBA1C MFR BLD: 7.4 % (ref 0–5.6)

## 2020-10-15 PROCEDURE — 83036 HEMOGLOBIN GLYCOSYLATED A1C: CPT | Performed by: INTERNAL MEDICINE

## 2020-10-15 PROCEDURE — 36415 COLL VENOUS BLD VENIPUNCTURE: CPT | Performed by: INTERNAL MEDICINE

## 2020-10-15 PROCEDURE — 80053 COMPREHEN METABOLIC PANEL: CPT | Performed by: INTERNAL MEDICINE

## 2020-10-15 PROCEDURE — 80061 LIPID PANEL: CPT | Performed by: INTERNAL MEDICINE

## 2020-10-16 LAB
ALBUMIN SERPL-MCNC: 4.3 G/DL (ref 3.4–5)
ALP SERPL-CCNC: 70 U/L (ref 40–150)
ALT SERPL W P-5'-P-CCNC: 48 U/L (ref 0–70)
ANION GAP SERPL CALCULATED.3IONS-SCNC: 8 MMOL/L (ref 3–14)
AST SERPL W P-5'-P-CCNC: 24 U/L (ref 0–45)
BILIRUB SERPL-MCNC: 0.5 MG/DL (ref 0.2–1.3)
BUN SERPL-MCNC: 12 MG/DL (ref 7–30)
CALCIUM SERPL-MCNC: 9.5 MG/DL (ref 8.5–10.1)
CHLORIDE SERPL-SCNC: 102 MMOL/L (ref 94–109)
CHOLEST SERPL-MCNC: 159 MG/DL
CO2 SERPL-SCNC: 26 MMOL/L (ref 20–32)
CREAT SERPL-MCNC: 0.7 MG/DL (ref 0.66–1.25)
GFR SERPL CREATININE-BSD FRML MDRD: >90 ML/MIN/{1.73_M2}
GLUCOSE SERPL-MCNC: 163 MG/DL (ref 70–99)
HDLC SERPL-MCNC: 43 MG/DL
LDLC SERPL CALC-MCNC: 87 MG/DL
NONHDLC SERPL-MCNC: 116 MG/DL
POTASSIUM SERPL-SCNC: 4.3 MMOL/L (ref 3.4–5.3)
PROT SERPL-MCNC: 7.8 G/DL (ref 6.8–8.8)
SODIUM SERPL-SCNC: 136 MMOL/L (ref 133–144)
TRIGL SERPL-MCNC: 143 MG/DL

## 2020-10-19 ENCOUNTER — OFFICE VISIT (OUTPATIENT)
Dept: INTERNAL MEDICINE | Facility: CLINIC | Age: 50
End: 2020-10-19
Payer: COMMERCIAL

## 2020-10-19 VITALS
SYSTOLIC BLOOD PRESSURE: 150 MMHG | DIASTOLIC BLOOD PRESSURE: 88 MMHG | RESPIRATION RATE: 16 BRPM | HEART RATE: 89 BPM | OXYGEN SATURATION: 97 % | WEIGHT: 286 LBS | HEIGHT: 67 IN | BODY MASS INDEX: 44.89 KG/M2 | TEMPERATURE: 99 F

## 2020-10-19 DIAGNOSIS — E78.2 MIXED HYPERLIPIDEMIA: ICD-10-CM

## 2020-10-19 DIAGNOSIS — Z12.11 SCREENING FOR COLON CANCER: ICD-10-CM

## 2020-10-19 DIAGNOSIS — I10 ESSENTIAL HYPERTENSION, BENIGN: ICD-10-CM

## 2020-10-19 DIAGNOSIS — E11.9 TYPE 2 DIABETES MELLITUS WITHOUT COMPLICATION, WITHOUT LONG-TERM CURRENT USE OF INSULIN (H): Primary | ICD-10-CM

## 2020-10-19 PROCEDURE — 99214 OFFICE O/P EST MOD 30 MIN: CPT | Performed by: INTERNAL MEDICINE

## 2020-10-19 RX ORDER — HYDROCHLOROTHIAZIDE 25 MG/1
25 TABLET ORAL DAILY
Qty: 90 TABLET | Refills: 1 | Status: SHIPPED | OUTPATIENT
Start: 2020-10-19 | End: 2021-01-06

## 2020-10-19 RX ORDER — GLIMEPIRIDE 1 MG/1
1 TABLET ORAL DAILY
Qty: 90 TABLET | Refills: 1 | Status: SHIPPED | OUTPATIENT
Start: 2020-10-19 | End: 2020-12-24

## 2020-10-19 RX ORDER — LOSARTAN POTASSIUM 50 MG/1
75 TABLET ORAL DAILY
Qty: 45 TABLET | Refills: 1 | Status: SHIPPED | OUTPATIENT
Start: 2020-10-19 | End: 2021-01-06

## 2020-10-19 ASSESSMENT — MIFFLIN-ST. JEOR: SCORE: 2105.92

## 2020-10-19 NOTE — NURSING NOTE
"BP (!) 140/82   Pulse 89   Temp 99  F (37.2  C) (Oral)   Resp 16   Ht 1.702 m (5' 7\")   Wt 129.7 kg (286 lb)   SpO2 97%   BMI 44.79 kg/m    Patient in for follow up and lipids, htn and DM2.  Jayne Smith, ISATU    " History  Chief Complaint   Patient presents with    Abdominal Pain     He has lower abdominal pain since last night  He has nausea, but no vomiting  He has no diarrhes  No pain on urination  70-year-old gentleman presents with complaint of lower abdominal pain  States that symptoms began this morning and have been progressive since time of onset  Pain is primarily in the right lower quadrant/groin area and radiates up towards his umbilicus  He denies any fevers but has had nausea without vomiting or diarrhea  Denies chest pain, shortness of breath, or any other acute issues or concerns  Abdominal Pain   Pain location:  RLQ  Pain quality: aching and dull    Pain radiates to:  Periumbilical region  Pain severity:  Mild  Onset quality:  Gradual  Timing:  Constant  Progression:  Worsening  Relieved by:  Nothing  Worsened by:  Palpation  Ineffective treatments:  None tried  Associated symptoms: nausea    Associated symptoms: no anorexia, no belching, no chest pain, no chills, no constipation, no cough, no diarrhea, no dysuria, no fatigue, no fever, no flatus, no hematuria, no melena, no shortness of breath, no sore throat and no vomiting        Prior to Admission Medications   Prescriptions Last Dose Informant Patient Reported?  Taking?   acetaminophen (TYLENOL) 325 mg tablet   No Yes   Sig: Take 2 tablets (650 mg total) by mouth every 6 (six) hours as needed for fever (temperature greater than 101 F)   albuterol (2 5 mg/3 mL) 0 083 % nebulizer solution  Self No Yes   Sig: Take 1 vial (2 5 mg total) by nebulization every 6 (six) hours as needed for wheezing or shortness of breath   albuterol (VENTOLIN HFA) 90 mcg/act inhaler  Self Yes Yes   Sig: Inhale 2 puffs   amLODIPine (NORVASC) 10 mg tablet   No Yes   Sig: Take 1 tablet (10 mg total) by mouth daily   aspirin (ECOTRIN LOW STRENGTH) 81 mg EC tablet   No Yes   Sig: Take 1 tablet (81 mg total) by mouth daily   atorvastatin (LIPITOR) 10 mg tablet  Self Yes Yes   Sig: Take 10 mg by mouth daily   budesonide-formoterol (SYMBICORT) 160-4 5 mcg/act inhaler Not Taking at Unknown time Self Yes No   Sig: Inhale 2 puffs 2 (two) times a day Rinse mouth after use     docusate sodium (COLACE) 100 mg capsule   No Yes   Sig: Take 1 capsule (100 mg total) by mouth 2 (two) times a day   furosemide (LASIX) 20 mg tablet   No Yes   Sig: Take 1 tablet (20 mg total) by mouth daily   losartan (COZAAR) 100 MG tablet   No Yes   Sig: Take 1 tablet (100 mg total) by mouth daily   metoprolol tartrate (LOPRESSOR) 50 mg tablet   No Yes   Sig: Take 1 tablet (50 mg total) by mouth every 12 (twelve) hours   pantoprazole (PROTONIX) 40 mg tablet  Self No Yes   Sig: Take 1 tablet (40 mg total) by mouth daily   polyethylene glycol (MIRALAX) 17 g packet   No Yes   Sig: Take 17 g by mouth daily   tiotropium (SPIRIVA) 18 mcg inhalation capsule  Self Yes Yes   Sig: Place 18 mcg into inhaler and inhale      Facility-Administered Medications: None       Past Medical History:   Diagnosis Date    Appendicolith     Ascending aortic aneurysm (HCC)     3 7    Asthma     BPH (benign prostatic hyperplasia)     CAD (coronary artery disease)     noted on CT scan    COPD (chronic obstructive pulmonary disease) (HCC)     Descending thoracic aortic aneurysm (HCC)     Diverticulosis     Former tobacco use     GERD (gastroesophageal reflux disease)     History of DVT (deep vein thrombosis)     Left leg    History of transfusion     Hypertension     Inguinal hernia     right    Nephrolithiasis     Oxygen dependent     2LNC    Prostate calculus     PVD (peripheral vascular disease) (HCC)     Ulcer     Varicose vein of leg     b/l       Past Surgical History:   Procedure Laterality Date    ESOPHAGOGASTRODUODENOSCOPY      INGUINAL HERNIA REPAIR Bilateral     IR TEVAR  12/27/2018    OK ENDOVASC TAA REINCL SUBCL N/A 12/27/2018    Procedure: TEVAR - endovascular thoracic aortic aneurysm repair; Surgeon: Adelaide Ortega MD;  Location: [facility];  Service: Vascular    VARICOSE VEIN SURGERY Bilateral     vein stripping       Family History   Problem Relation Age of Onset    Tuberculosis Mother     No Known Problems Father     Cancer Sister     Diabetes Family     Hypertension Family      I have reviewed and agree with the history as documented  Social History   Substance Use Topics    Smoking status: Former Smoker     Packs/day: 1 00     Years: 35 00     Start date: 1966     Quit date: 2001    Smokeless tobacco: Never Used    Alcohol use No        Review of Systems   Constitutional: Negative for activity change, chills, fatigue and fever  HENT: Negative  Negative for congestion, postnasal drip, rhinorrhea, sinus pain, sore throat and trouble swallowing  Eyes: Negative  Respiratory: Negative  Negative for cough and shortness of breath  Cardiovascular: Negative for chest pain  Gastrointestinal: Positive for abdominal pain and nausea  Negative for anorexia, constipation, diarrhea, flatus, melena and vomiting  Endocrine: Negative  Genitourinary: Negative  Negative for discharge, dysuria, flank pain, frequency, hematuria, penile pain, scrotal swelling, testicular pain and urgency  Musculoskeletal: Negative  Negative for arthralgias, back pain and myalgias  Skin: Negative  Allergic/Immunologic: Negative  Neurological: Negative  Hematological: Negative  Psychiatric/Behavioral: Negative  Physical Exam  Physical Exam   Constitutional: He is oriented to person, place, and time  He appears well-developed and well-nourished  No distress  HENT:   Head: Normocephalic and atraumatic  Nose: Nose normal    Mouth/Throat: Oropharynx is clear and moist    Eyes: Pupils are equal, round, and reactive to light  Conjunctivae are normal    Neck: Neck supple  Cardiovascular: Normal rate, regular rhythm and normal heart sounds      Pulmonary/Chest: Effort normal and breath sounds normal  No respiratory distress  Abdominal: Soft  Bowel sounds are normal  He exhibits no distension  There is tenderness in the right lower quadrant  There is no rebound and no guarding  Musculoskeletal: Normal range of motion  He exhibits no edema  Neurological: He is alert and oriented to person, place, and time  Skin: Skin is warm and dry  Capillary refill takes less than 2 seconds  He is not diaphoretic  Psychiatric: He has a normal mood and affect  His behavior is normal    Nursing note and vitals reviewed        Vital Signs  ED Triage Vitals   Temperature Pulse Respirations Blood Pressure SpO2   01/19/19 1125 01/19/19 1125 01/19/19 1125 01/19/19 1125 01/19/19 1125   98 6 °F (37 °C) 94 20 148/78 96 %      Temp Source Heart Rate Source Patient Position - Orthostatic VS BP Location FiO2 (%)   01/19/19 1125 01/19/19 1212 01/19/19 1125 01/19/19 1125 --   Tympanic Monitor Sitting Left arm       Pain Score       01/19/19 1125       8           Vitals:    01/19/19 1125 01/19/19 1212 01/19/19 1400   BP: 148/78 139/75 158/86   Pulse: 94 86 94   Patient Position - Orthostatic VS: Sitting Lying Lying       Visual Acuity      ED Medications  Medications   sodium chloride 0 9 % bolus 1,000 mL (0 mL Intravenous Stopped 1/19/19 1429)   ondansetron (ZOFRAN) injection 4 mg (4 mg Intravenous Given 1/19/19 1207)   iohexol (OMNIPAQUE) 240 MG/ML solution 50 mL (50 mL Oral Given 1/19/19 1326)   iohexol (OMNIPAQUE) 350 MG/ML injection (MULTI-DOSE) 100 mL (100 mL Intravenous Given 1/19/19 1618)   morphine injection 2 mg (2 mg Intravenous Given 1/19/19 1453)       Diagnostic Studies  Results Reviewed     Procedure Component Value Units Date/Time    Urine Microscopic [[id number]]  (Abnormal) Collected:  01/19/19 1326    Lab Status:  Final result Specimen:  Urine from Urine, Clean Catch Updated:  01/19/19 1428     RBC, UA None Seen /hpf      WBC, UA 0-1 (A) /hpf      Epithelial Cells Occasional /hpf      Bacteria, UA Occasional /hpf      MUCUS THREADS Moderate (A)    UA w Reflex to Microscopic [[id number]]  (Abnormal) Collected:  01/19/19 1326    Lab Status:  Final result Specimen:  Urine from Urine, Clean Catch Updated:  01/19/19 1355     Color, UA Yellow     Clarity, UA Clear     Specific [place], UA 1 020     pH, UA 6 5     Leukocytes, UA Negative     Nitrite, UA Negative     Protein, UA 15 (Trace) (A) mg/dl      Glucose, UA Negative mg/dl      Ketones, UA Negative mg/dl      Bilirubin, UA Negative     Blood, UA Negative     UROBILINOGEN UA Negative mg/dL     Comprehensive metabolic panel [[id number]]  (Abnormal) Collected:  01/19/19 1253    Lab Status:  Final result Specimen:  Blood from Arm, Right Updated:  01/19/19 1316     Sodium 138 mmol/L      Potassium 5 5 (H) mmol/L      Chloride 99 mmol/L      CO2 34 (H) mmol/L      ANION GAP 5 mmol/L      BUN 20 mg/dL      Creatinine 0 85 mg/dL      Glucose 130 (H) mg/dL      Calcium 9 6 mg/dL      AST 38 U/L      ALT 35 U/L      Alkaline Phosphatase 83 U/L      Total Protein 8 2 g/dL      Albumin 3 9 g/dL      Total Bilirubin 1 00 mg/dL      eGFR 82 ml/min/1 73sq m     Narrative:       Hemolysis  National Kidney Disease Education Program recommendations are as follows:  GFR calculation is accurate only with a steady state creatinine  Chronic Kidney disease less than 60 ml/min/1 73 sq  meters  Kidney failure less than 15 ml/min/1 73 sq  meters      CBC and differential [[id number]]  (Abnormal) Collected:  01/19/19 1206    Lab Status:  Final result Specimen:  Blood from Arm, Right Updated:  01/19/19 1227     WBC 8 40 Thousand/uL      RBC 4 37 (L) Million/uL      Hemoglobin 13 7 g/dL      Hematocrit 43 4 %      MCV 99 fL      MCH 31 4 pg      MCHC 31 6 g/dL      RDW 13 9 %      MPV 7 2 (L) fL      Platelets 692 Thousands/uL      Neutrophils Relative 78 (H) %      Lymphocytes Relative 12 (L) %      Monocytes Relative 8 %      Eosinophils Relative 1 %      Basophils Relative 1 % Neutrophils Absolute 6 50 Thousands/µL      Lymphocytes Absolute 1 00 Thousands/µL      Monocytes Absolute 0 70 Thousand/µL      Eosinophils Absolute 0 10 Thousand/µL      Basophils Absolute 0 10 Thousands/µL                  CT abdomen pelvis with contrast   Final Result by Volodymyr Pal MD (01/19 1632)      The known small bowel containing right inguinal hernia now serves as a transition point for small bowel obstruction  Moderately dilated loops of small bowel proximal to the hernia  Indeterminate left renal lesion as previously described  Consider follow-up with nonurgent renal MRI      The study was marked in EPIC for immediate notification  Workstation performed: [id number]                    Procedures  ECG 12 Lead Documentation  Date/Time: 1/19/2019 12:29 PM  Performed by: Annette Hodge  Authorized by: Annette Hodge     Rate:     ECG rate:  85    ECG rate assessment: normal    Rhythm:     Rhythm: sinus rhythm    Ectopy:     Ectopy: none    QRS:     QRS axis:  Normal  ST segments:     ST segments:  Normal  T waves:     T waves: non-specific             Phone Contacts  ED Phone Contact    ED Course                               MDM  CritCare Time    Disposition  Final diagnoses:   Small bowel obstruction ([address] )   Abdominal pain     Time reflects when diagnosis was documented in both MDM as applicable and the Disposition within this note     Time User Action Codes Description Comment    1/19/2019  4:53 PM Luann Dangelo Add [P93 037] Small bowel obstruction ([address] )     1/19/2019  4:53 PM Luann Dangelo Add [R10 9] Abdominal pain       ED Disposition     ED Disposition Condition Comment    Admit  Case was discussed with Dr Haroon Kitchen and the patient's admission status was agreed to be Admission Status: inpatient status to the service of Dr Haroon Kitchen           Follow-up Information    None         Patient's Medications   Discharge Prescriptions    No medications on file     No discharge procedures on file      ED Provider  Electronically Signed by           Flavia Mcnulty,   01/20/19 0938

## 2020-10-19 NOTE — PROGRESS NOTES
Subjective     Montez Valerio is a 50 year old male who presents to clinic today for the following health issues:    HPI         Diabetes Follow-up    How often are you checking your blood sugar? A few times a month- 140-150   What time of day are you checking your blood sugars (select all that apply)?  Before meals  Have you had any blood sugars above 200?  No  Have you had any blood sugars below 70?  No    What symptoms do you notice when your blood sugar is low?  None    What concerns do you have today about your diabetes? None     Do you have any of these symptoms? (Select all that apply)  No numbness or tingling in feet.  No redness, sores or blisters on feet.  No complaints of excessive thirst.  No reports of blurry vision.  No significant changes to weight.    Have you had a diabetic eye exam in the last 12 months? No       Hyperlipidemia Follow-Up      Are you regularly taking any medication or supplement to lower your cholesterol?   Yes- statin    Are you having muscle aches or other side effects that you think could be caused by your cholesterol lowering medication?  No    Hypertension Follow-up      Do you check your blood pressure regularly outside of the clinic? Yes     Are you following a low salt diet? Yes    Are your blood pressures ever more than 140 on the top number (systolic) OR more   than 90 on the bottom number (diastolic), for example 140/90? Yes    BP Readings from Last 2 Encounters:   09/12/20 132/84   01/28/20 (!) 147/88     Hemoglobin A1C (%)   Date Value   10/15/2020 7.4 (H)   01/28/2020 8.6 (H)     LDL Cholesterol Calculated (mg/dL)   Date Value   10/15/2020 87   01/28/2020 117 (H)         How many servings of fruits and vegetables do you eat daily?  4 or more    On average, how many sweetened beverages do you drink each day (Examples: soda, juice, sweet tea, etc.  Do NOT count diet or artificially sweetened beverages)?   1    How many days per week do you exercise enough to make your  heart beat faster? 5    How many minutes a day do you exercise enough to make your heart beat faster? 30 - 60    How many days per week do you miss taking your medication? 0      Past Medical History:   Diagnosis Date     Arthritis     Neck     Diabetes mellitus (H)     type 2     Dyspnea on exertion      Gastro-oesophageal reflux disease      Hypertension      Other chronic pain     neck pain     PONV (postoperative nausea and vomiting)      Sleep apnea     CPAP     SOB (shortness of breath) 4/26/2017       Current Outpatient Medications   Medication Sig Dispense Refill     blood glucose monitoring (ACCU-CHEK HOWARD PLUS) test strip by In Vitro route daily 100 each 1     blood glucose monitoring (ONE TOUCH DELICA) lancets Use to test blood sugars ONE times daily or as directed. 1 Box 3     blood glucose monitoring (ONE TOUCH VERIO IQ) test strip Use to test blood sugars ONE times daily or as directed. 100 each 3     cholecalciferol (VITAMIN  -D) 1000 UNITS capsule Take 1 capsule by mouth daily       glimepiride (AMARYL) 1 MG tablet Take 1 tablet (1 mg) by mouth daily 90 tablet 1     hydrochlorothiazide (HYDRODIURIL) 25 MG tablet Take 1 tablet (25 mg) by mouth daily 90 tablet 1     ibuprofen (ADVIL/MOTRIN) 600 MG tablet Take 1 tablet (600 mg) by mouth every 6 hours as needed for pain (mild) 30 tablet 0     losartan (COZAAR) 50 MG tablet Take 1.5 tablets (75 mg) by mouth daily 45 tablet 1     metFORMIN (GLUCOPHAGE) 500 MG tablet Take 2 tablets (1,000 mg) by mouth 2 times daily (with meals) 360 tablet 1     simvastatin (ZOCOR) 20 MG tablet Take 1 tablet (20 mg) by mouth daily at bedtime. 90 tablet 3       Review of Systems   CONSTITUTIONAL: NEGATIVE for fever, chills, change in weight  EYES: NEGATIVE for vision changes or irritation  ENT/MOUTH: NEGATIVE for ear, mouth and throat problems  RESP: NEGATIVE for significant cough or SOB  CV: NEGATIVE for chest pain, palpitations or peripheral edema  NEURO: NEGATIVE for  "weakness, dizziness or paresthesias  ENDOCRINE: NEGATIVE for temperature intolerance, skin/hair changes  PSYCHIATRIC: NEGATIVE for changes in mood or affect      Objective    BP (!) 150/88   Pulse 89   Temp 99  F (37.2  C) (Oral)   Resp 16   Ht 1.702 m (5' 7\")   Wt 129.7 kg (286 lb)   SpO2 97%   BMI 44.79 kg/m    Body mass index is 44.79 kg/m .  Physical Exam   GENERAL: healthy, alert and no distress  EYES: Eyes grossly normal to inspection, PERRL and conjunctivae and sclerae normal  NECK: no adenopathy, no asymmetry, masses, or scars and thyroid normal to palpation  RESP: lungs clear to auscultation - no rales, rhonchi or wheezes  CV: regular rate and rhythm, normal S1 S2, no S3 or S4, no murmur, click or rub, no peripheral edema and peripheral pulses strong  MS: no gross musculoskeletal defects noted, no edema  NEURO: Normal strength and tone, mentation intact and speech normal  Diabetic foot exam: normal DP and PT pulses, no trophic changes or ulcerative lesions, normal sensory exam and normal monofilament exam          Assessment & Plan     (E11.9) Type 2 diabetes mellitus without complication, without long-term current use of insulin (H)  (primary encounter diagnosis)  Comment: A1c has improved and 7.4 today  Plan continue glimepiride (AMARYL) 1 MG tablet,.explained clearly about the medication,insructions and side effects.  Extended form of metformin has been recalled, patient was started metFORMIN (GLUCOPHAGE)  500 MG tablet regular release 2 tablets twice daily as directed.explained clearly about the medication,insructions and side effects.  Continue to follow ADA diet regular exercise   Reprinted referral to Ophthalmologist. Advised to get eye exam once a yr.       (I10) Essential hypertension, benign  Comment: Elevated blood pressure  Plan: Increase losartan to 75 mg daily and continue hydrochlorothiazide (HYDRODIURIL) 25 MG tablet daily.explained clearly about the medication,insructions and side " "effects.  Patient was advised to follow low-sodium diet regular exercise and follow-up in clinic in 3 to 4 weeks            (E78.2) Mixed hyperlipidemia  Plan: LDL at goal, continue simvastatin as directed    (Z12.11) Screening for colon cancer  Plan: GASTROENTEROLOGY ADULT REF PROCEDURE ONLY             BMI:   Estimated body mass index is 44.79 kg/m  as calculated from the following:    Height as of this encounter: 1.702 m (5' 7\").    Weight as of this encounter: 129.7 kg (286 lb).   Weight management plan: Discussed healthy diet and exercise guidelines            Return in about 4 weeks (around 11/16/2020) for BP Recheck.    Edna Younger MD  [facility]    "

## 2020-12-23 DIAGNOSIS — E11.9 TYPE 2 DIABETES MELLITUS WITHOUT COMPLICATION, WITHOUT LONG-TERM CURRENT USE OF INSULIN (H): ICD-10-CM

## 2020-12-23 DIAGNOSIS — I10 ESSENTIAL HYPERTENSION, BENIGN: ICD-10-CM

## 2021-01-04 DIAGNOSIS — I10 ESSENTIAL HYPERTENSION, BENIGN: ICD-10-CM

## 2021-01-04 NOTE — TELEPHONE ENCOUNTER
Patient states for his long term medications he is requesting [facility] .     FREDERICK Coto LPN

## 2021-01-06 RX ORDER — METFORMIN HCL 500 MG
TABLET, EXTENDED RELEASE 24 HR ORAL
Qty: 360 TABLET | Refills: 0 | Status: SHIPPED | OUTPATIENT
Start: 2021-01-06 | End: 2021-03-23

## 2021-01-06 RX ORDER — LOSARTAN POTASSIUM 50 MG/1
TABLET ORAL
Qty: 90 TABLET | Refills: 0 | Status: SHIPPED | OUTPATIENT
Start: 2021-01-06 | End: 2021-03-23

## 2021-01-06 RX ORDER — HYDROCHLOROTHIAZIDE 25 MG/1
TABLET ORAL
Qty: 90 TABLET | Refills: 0 | Status: SHIPPED | OUTPATIENT
Start: 2021-01-06 | End: 2021-03-23

## 2021-01-06 RX ORDER — GLIMEPIRIDE 1 MG/1
TABLET ORAL
Qty: 90 TABLET | Refills: 1 | Status: SHIPPED | OUTPATIENT
Start: 2021-01-06 | End: 2021-03-23

## 2021-01-06 NOTE — TELEPHONE ENCOUNTER
Patient requesting remaining refills from 10/19/20 be sent to another pharmacy    Prescription approved per [facility] Ref[facility] Protocol.

## 2021-01-14 ENCOUNTER — HEALTH MAINTENANCE LETTER (OUTPATIENT)
Age: 51
End: 2021-01-14

## 2021-03-01 ENCOUNTER — TRANSFERRED RECORDS (OUTPATIENT)
Dept: HEALTH INFORMATION MANAGEMENT | Facility: CLINIC | Age: 51
End: 2021-03-01

## 2021-03-01 LAB — RETINOPATHY: NEGATIVE

## 2021-03-19 ENCOUNTER — IMMUNIZATION (OUTPATIENT)
Dept: NURSING | Facility: CLINIC | Age: 51
End: 2021-03-19
Payer: COMMERCIAL

## 2021-03-19 PROCEDURE — 91301 PR COVID VAC MODERNA 100 MCG/0.5 ML IM: CPT

## 2021-03-19 PROCEDURE — 0011A PR COVID VAC MODERNA 100 MCG/0.5 ML IM: CPT

## 2021-03-23 ENCOUNTER — OFFICE VISIT (OUTPATIENT)
Dept: INTERNAL MEDICINE | Facility: CLINIC | Age: 51
End: 2021-03-23
Payer: COMMERCIAL

## 2021-03-23 VITALS
TEMPERATURE: 99.4 F | RESPIRATION RATE: 17 BRPM | SYSTOLIC BLOOD PRESSURE: 130 MMHG | DIASTOLIC BLOOD PRESSURE: 82 MMHG | WEIGHT: 280.2 LBS | HEIGHT: 67 IN | HEART RATE: 101 BPM | OXYGEN SATURATION: 95 % | BODY MASS INDEX: 43.98 KG/M2

## 2021-03-23 DIAGNOSIS — E78.2 MIXED HYPERLIPIDEMIA: ICD-10-CM

## 2021-03-23 DIAGNOSIS — Z00.00 ROUTINE HISTORY AND PHYSICAL EXAMINATION OF ADULT: Primary | ICD-10-CM

## 2021-03-23 DIAGNOSIS — E11.9 TYPE 2 DIABETES MELLITUS WITHOUT COMPLICATION, WITHOUT LONG-TERM CURRENT USE OF INSULIN (H): ICD-10-CM

## 2021-03-23 DIAGNOSIS — I10 ESSENTIAL HYPERTENSION, BENIGN: ICD-10-CM

## 2021-03-23 DIAGNOSIS — E66.01 MORBID OBESITY (H): ICD-10-CM

## 2021-03-23 LAB
HBA1C MFR BLD: 8 % (ref 0–5.6)
HGB BLD-MCNC: 14.9 G/DL (ref 13.3–17.7)

## 2021-03-23 PROCEDURE — 82043 UR ALBUMIN QUANTITATIVE: CPT | Performed by: INTERNAL MEDICINE

## 2021-03-23 PROCEDURE — G0103 PSA SCREENING: HCPCS | Performed by: INTERNAL MEDICINE

## 2021-03-23 PROCEDURE — 80053 COMPREHEN METABOLIC PANEL: CPT | Performed by: INTERNAL MEDICINE

## 2021-03-23 PROCEDURE — 99213 OFFICE O/P EST LOW 20 MIN: CPT | Mod: 25 | Performed by: INTERNAL MEDICINE

## 2021-03-23 PROCEDURE — 36415 COLL VENOUS BLD VENIPUNCTURE: CPT | Performed by: INTERNAL MEDICINE

## 2021-03-23 PROCEDURE — 83036 HEMOGLOBIN GLYCOSYLATED A1C: CPT | Performed by: INTERNAL MEDICINE

## 2021-03-23 PROCEDURE — 99396 PREV VISIT EST AGE 40-64: CPT | Performed by: INTERNAL MEDICINE

## 2021-03-23 PROCEDURE — 85018 HEMOGLOBIN: CPT | Performed by: INTERNAL MEDICINE

## 2021-03-23 RX ORDER — LOSARTAN POTASSIUM 50 MG/1
75 TABLET ORAL DAILY
Qty: 135 TABLET | Refills: 1 | Status: SHIPPED | OUTPATIENT
Start: 2021-03-23 | End: 2021-10-01

## 2021-03-23 RX ORDER — GLIMEPIRIDE 1 MG/1
2 TABLET ORAL
Qty: 90 TABLET | Refills: 1
Start: 2021-03-23 | End: 2021-10-01

## 2021-03-23 RX ORDER — SIMVASTATIN 20 MG
20 TABLET ORAL DAILY
Qty: 90 TABLET | Refills: 3 | Status: SHIPPED | OUTPATIENT
Start: 2021-03-23 | End: 2021-10-01

## 2021-03-23 RX ORDER — HYDROCHLOROTHIAZIDE 25 MG/1
25 TABLET ORAL DAILY
Qty: 90 TABLET | Refills: 1 | Status: SHIPPED | OUTPATIENT
Start: 2021-03-23 | End: 2021-10-01

## 2021-03-23 ASSESSMENT — ENCOUNTER SYMPTOMS
WEAKNESS: 0
HEARTBURN: 0
DIARRHEA: 0
HEMATOCHEZIA: 0
PARESTHESIAS: 0
FREQUENCY: 0
JOINT SWELLING: 0
EYE PAIN: 0
PALPITATIONS: 0
SHORTNESS OF BREATH: 0
ARTHRALGIAS: 1
NAUSEA: 0
DYSURIA: 0
DIZZINESS: 0
HEADACHES: 0
FEVER: 0
CONSTIPATION: 0
SORE THROAT: 1
NERVOUS/ANXIOUS: 0
CHILLS: 0
COUGH: 0
MYALGIAS: 1
ABDOMINAL PAIN: 0
HEMATURIA: 0

## 2021-03-23 ASSESSMENT — MIFFLIN-ST. JEOR: SCORE: 2074.61

## 2021-03-23 NOTE — NURSING NOTE
"/82   Pulse 101   Temp 99.4  F (37.4  C) (Oral)   Resp 17   Ht 1.702 m (5' 7\")   Wt 127.1 kg (280 lb 3.2 oz)   SpO2 95%   BMI 43.89 kg/m    Patient in for Male Px.  Jayne Smith CMA    "

## 2021-03-23 NOTE — PROGRESS NOTES
SUBJECTIVE:   CC: Montez Valerio is an 51 year old male who presents for preventative health visit.       Patient has been advised of split billing requirements and indicates understanding: Yes  Healthy Habits:     Getting at least 3 servings of Calcium per day:  Yes    Bi-annual eye exam:  Yes    Dental care twice a year:  Yes    Sleep apnea or symptoms of sleep apnea:  Sleep apnea    Diet:  Regular (no restrictions)    Frequency of exercise:  4-5 days/week    Duration of exercise:  15-30 minutes    Taking medications regularly:  Yes    Medication side effects:  Not applicable    PHQ-2 Total Score: 0    Additional concerns today:  No        Diabetes Follow-up    How often are you checking your blood sugar? Four or more times week- BS - 150-170  What time of day are you checking your blood sugars (select all that apply)?  Before meals  Have you had any blood sugars above 200?  No  Have you had any blood sugars below 70?  No    What symptoms do you notice when your blood sugar is low?  None    What concerns do you have today about your diabetes? None     Do you have any of these symptoms? (Select all that apply)  No numbness or tingling in feet.  No redness, sores or blisters on feet.  No complaints of excessive thirst.  No reports of blurry vision.  No significant changes to weight.  Last eye exam -3/21      BP Readings from Last 2 Encounters:   03/23/21 130/82   10/19/20 (!) 150/88     Hemoglobin A1C (%)   Date Value   10/15/2020 7.4 (H)   01/28/2020 8.6 (H)     LDL Cholesterol Calculated (mg/dL)   Date Value   10/15/2020 87   01/28/2020 117 (H)      Hyperlipidemia Follow-Up      Are you regularly taking any medication or supplement to lower your cholesterol?   Yes- simvastatin     Are you having muscle aches or other side effects that you think could be caused by your cholesterol lowering medication?  No    Hypertension Follow-up      Do you check your blood pressure regularly outside of the clinic? Yes , losartan  was increased to 75 mg daily at last OV. tolerating well.     Are you following a low salt diet? Yes    Are your blood pressures ever more than 140 on the top number (systolic) OR more   than 90 on the bottom number (diastolic), for example 140/90? No      Today's PHQ-2 Score:   PHQ-2 ( 1999 Pfizer) 3/23/2021   Q1: Little interest or pleasure in doing things 0   Q2: Feeling down, depressed or hopeless 0   PHQ-2 Score 0   Q1: Little interest or pleasure in doing things Not at all   Q2: Feeling down, depressed or hopeless Not at all   PHQ-2 Score 0       Abuse: Current or Past(Physical, Sexual or Emotional)- No  Do you feel safe in your environment? Yes    Have you ever done Advance Care Planning? (For example, a Health Directive, POLST, or a discussion with a medical provider or your loved ones about your wishes): Yes, patient states has an Advance Care Planning document and will bring a copy to the clinic.      Past Medical History:   Diagnosis Date     Arthritis     Neck     Diabetes mellitus (H)     type 2     Dyspnea on exertion      Gastro-oesophageal reflux disease      Hypertension      Other chronic pain     neck pain     PONV (postoperative nausea and vomiting)      Sleep apnea     CPAP     SOB (shortness of breath) 4/26/2017         Past Surgical History:   Procedure Laterality Date     ARTHROSCOPY KNEE  2/12/2014    Procedure: ARTHROSCOPY KNEE;  Right Knee Arthroscopy, Removal of Loose Body, Partial Lateral Meniscectomy  ;  Surgeon: Amado Rondon MD;  Location:  OR     AXILLARY SURGERY      Mass removed left     HC KNEE SCOPE, DIAGNOSTIC  1984    Arthroscopy, Knee right     HERNIORRHAPHY EPIGASTRIC N/A 7/18/2018    Procedure: HERNIORRHAPHY EPIGASTRIC;  open epigastric hernia repair with mesh;  Surgeon: Chastity Rios MD;  Location:  OR     LAPAROSCOPIC CHOLECYSTECTOMY  12/21/2012    Procedure: LAPAROSCOPIC CHOLECYSTECTOMY;  LAPAROSCOPIC CHOLECYSTECTOMY;  Surgeon: Alton Riggs MD;   Location: [facility] OR     wisdom teeth[         Current Outpatient Medications   Medication Sig Dispense Refill     blood glucose monitoring (ACCU-CHEK HOWARD PLUS) test strip by In Vitro route daily 100 each 1     blood glucose monitoring (ONE TOUCH DELICA) lancets Use to test blood sugars ONE times daily or as directed. 1 Box 3     blood glucose monitoring (ONE TOUCH VERIO IQ) test strip Use to test blood sugars ONE times daily or as directed. 100 each 3     cholecalciferol (VITAMIN  -D) 1000 UNITS capsule Take 1 capsule by mouth daily       glimepiride (AMARYL) 1 MG tablet Take 2 tablets (2 mg) by mouth every morning (before breakfast) 90 tablet 1     hydrochlorothiazide (HYDRODIURIL) 25 MG tablet Take 1 tablet (25 mg) by mouth daily 90 tablet 1     ibuprofen (ADVIL/MOTRIN) 600 MG tablet Take 1 tablet (600 mg) by mouth every 6 hours as needed for pain (mild) 30 tablet 0     losartan (COZAAR) 50 MG tablet Take 1.5 tablets (75 mg) by mouth daily 135 tablet 1     metFORMIN (GLUCOPHAGE) 500 MG tablet Take 2 tablets (1,000 mg) by mouth 2 times daily (with meals) 360 tablet 1     simvastatin (ZOCOR) 20 MG tablet Take 1 tablet (20 mg) by mouth daily at bedtime. 90 tablet 3       Family History   Problem Relation Age of Onset     Lipids Mother      Cerebrovascular Disease Mother      Lipids Father      Gallbladder Disease Father      Cerebrovascular Disease Father      C.A.D. Maternal Grandfather         MI     C.A.D. Paternal Grandfather         MI     Cancer Paternal Grandmother         Pancreatic     Alcohol/Drug Sister      Psychotic Disorder Sister      Mental Illness Sister      Gallbladder Disease Sister        Social History     Tobacco Use     Smoking status: Former Smoker     Types: Cigars, cigarillos or filtered cigars     Smokeless tobacco: Never Used     Tobacco comment: occ. cigar   Substance Use Topics     Alcohol use: Yes     Comment: 4-6 drinks, 1-2 nights week, beer/liquor     If you drink alcohol do you  typically have >3 drinks per day or >7 drinks per week? No    Alcohol Use 3/23/2021   Prescreen: >3 drinks/day or >7 drinks/week? Yes   Prescreen: >3 drinks/day or >7 drinks/week? -   AUDIT SCORE  7     AUDIT - Alcohol Use Disorders Identification Test - Reproduced from the World Health Organization Audit 2001 (Second Edition) 3/23/2021   1.  How often do you have a drink containing alcohol? 2 to 3 times a week   2.  How many drinks containing alcohol do you have on a typical day when you are drinking? 3 or 4   3.  How often do you have five or more drinks on one occasion? Monthly   4.  How often during the last year have you found that you were not able to stop drinking once you had started? Never   5.  How often during the last year have you failed to do what was normally expected of you because of drinking? Never   6.  How often during the last year have you needed a first drink in the morning to get yourself going after a heavy drinking session? Never   7.  How often during the last year have you had a feeling of guilt or remorse after drinking? Less than monthly   8.  How often during the last year have you been unable to remember what happened the night before because of your drinking? Never   9.  Have you or someone else been injured because of your drinking? No   10. Has a relative, friend, doctor or other health care worker been concerned about your drinking or suggested you cut down? No   TOTAL SCORE 7       Last PSA:   PSA   Date Value Ref Range Status   06/27/2018 1.17 0 - 4 ug/L Final     Comment:     Assay Method:  Chemiluminescence using Siemens Vista analyzer       Reviewed orders with patient. Reviewed health maintenance and updated orders accordingly - Yes       Reviewed and updated as needed this visit by clinical staff                 Reviewed and updated as needed this visit by Provider                    Review of Systems   Constitutional: Negative for chills and fever.   HENT: Negative for  "congestion, ear pain and hearing loss.    Eyes: Negative for pain and visual disturbance.   Respiratory: Negative for cough and shortness of breath.    Cardiovascular: Negative for chest pain, palpitations and peripheral edema.   Gastrointestinal: Negative for abdominal pain, constipation, diarrhea, heartburn, hematochezia and nausea.   Genitourinary: Negative for discharge, dysuria, frequency, genital sores, hematuria, impotence and urgency.   Musculoskeletal: Positive for arthralgias. Negative for joint swelling.   Skin: Negative for rash.   Neurological: Negative for dizziness, weakness, headaches and paresthesias.   Psychiatric/Behavioral: Negative for mood changes. The patient is not nervous/anxious.         OBJECTIVE:   /82   Pulse 101   Temp 99.4  F (37.4  C) (Oral)   Resp 17   Ht 1.702 m (5' 7\")   Wt 127.1 kg (280 lb 3.2 oz)   SpO2 95%   BMI 43.89 kg/m      Physical Exam  GENERAL: healthy, alert and no distress  EYES: Eyes grossly normal to inspection, PERRL and conjunctivae and sclerae normal  NECK: no adenopathy, no asymmetry, masses, or scars and thyroid normal to palpation  RESP: lungs clear to auscultation - no rales, rhonchi or wheezes  CV: regular rate and rhythm, normal S1 S2, no S3 or S4, no murmur, click or rub, no peripheral edema and peripheral pulses strong  ABDOMEN: soft, nontender,   and bowel sounds normal  MS: no gross musculoskeletal defects noted, no edema  NEURO: Normal strength and tone, mentation intact and speech normal  PSYCH: mentation appears normal, affect normal/bright    Diagnostic Test Results:  Labs reviewed in Epic    ASSESSMENT/PLAN:      (Z00.00) Routine history and physical examination of adult  (primary encounter diagnosis)  Plan: Hemoglobin, Comprehensive metabolic panel,         Prostate spec antigen screen, GASTROENTEROLOGY         ADULT REF PROCEDURE ONLY       (E11.9) Type 2 diabetes mellitus without complication, without long-term current use of insulin " "(H)  Plan: Hemoglobin A1c, Albumin Random Urine Quantitative with Creat Ratio, refilled metFORMIN         (GLUCOPHAGE) 500 MG tablet- 2 tabs bid .explained clearly about the medication,insructions and side effects.  A1c 8.0 today -  Increase Amaryl ( Glimepiride) to 1 mg twice daily , continue metformin 1000 mg twice daily, continue ADA diet ,exercise and repeat A1c in 2 months,     (E78.2) Mixed hyperlipidemia  Plan: check lipids in 09/21, continue simvastatin (ZOCOR) 20 MG tablet refilled.explained clearly about the medication,insructions and side effects.      (E66.01) Morbid obesity (H)  Plan:  Discussed in detail about Diet,calorie intake,and importance of regular exercise,       (I10) Essential hypertension, benign  Comment: BP stable   Plan: hydrochlorothiazide (HYDRODIURIL) 25 MG tablet, losartan (COZAAR) 50 MG tablet- one and half tab daily ( total of 75 mg) ,explained clearly about the medication,insructions and side effects.Advised to follow low salt diet and exercise and f/u in 6 mths.        Patient has been advised of split billing requirements and indicates understanding: Yes  COUNSELING:   Reviewed preventive health counseling, as reflected in patient instructions       Regular exercise       Healthy diet/nutrition       Colon cancer screening    Estimated body mass index is 43.89 kg/m  as calculated from the following:    Height as of this encounter: 1.702 m (5' 7\").    Weight as of this encounter: 127.1 kg (280 lb 3.2 oz).     Weight management plan: Discussed healthy diet and exercise guidelines    He reports that he has quit smoking. His smoking use included cigars, cigarillos or filtered cigars. He has never used smokeless tobacco.      Counseling Resources:  ATP IV Guidelines  Pooled Cohorts Equation Calculator  FRAX Risk Assessment  ICSI Preventive Guidelines  Dietary Guidelines for Americans, 2010  USDA's MyPlate  ASA Prophylaxis  Lung CA Screening    Edna Younger MD  [facility] " [facility]

## 2021-03-24 LAB
ALBUMIN SERPL-MCNC: 4.4 G/DL (ref 3.4–5)
ALP SERPL-CCNC: 68 U/L (ref 40–150)
ALT SERPL W P-5'-P-CCNC: 47 U/L (ref 0–70)
ANION GAP SERPL CALCULATED.3IONS-SCNC: 7 MMOL/L (ref 3–14)
AST SERPL W P-5'-P-CCNC: 28 U/L (ref 0–45)
BILIRUB SERPL-MCNC: 0.8 MG/DL (ref 0.2–1.3)
BUN SERPL-MCNC: 10 MG/DL (ref 7–30)
CALCIUM SERPL-MCNC: 9.2 MG/DL (ref 8.5–10.1)
CHLORIDE SERPL-SCNC: 102 MMOL/L (ref 94–109)
CO2 SERPL-SCNC: 26 MMOL/L (ref 20–32)
CREAT SERPL-MCNC: 0.7 MG/DL (ref 0.66–1.25)
CREAT UR-MCNC: 39 MG/DL
GFR SERPL CREATININE-BSD FRML MDRD: >90 ML/MIN/{1.73_M2}
GLUCOSE SERPL-MCNC: 158 MG/DL (ref 70–99)
MICROALBUMIN UR-MCNC: 6 MG/L
MICROALBUMIN/CREAT UR: 14.58 MG/G CR (ref 0–17)
POTASSIUM SERPL-SCNC: 3.9 MMOL/L (ref 3.4–5.3)
PROT SERPL-MCNC: 7.8 G/DL (ref 6.8–8.8)
PSA SERPL-ACNC: 1.61 UG/L (ref 0–4)
SODIUM SERPL-SCNC: 135 MMOL/L (ref 133–144)

## 2021-04-16 ENCOUNTER — IMMUNIZATION (OUTPATIENT)
Dept: NURSING | Facility: CLINIC | Age: 51
End: 2021-04-16
Attending: INTERNAL MEDICINE
Payer: COMMERCIAL

## 2021-04-16 PROCEDURE — 0012A PR COVID VAC MODERNA 100 MCG/0.5 ML IM: CPT

## 2021-04-16 PROCEDURE — 91301 PR COVID VAC MODERNA 100 MCG/0.5 ML IM: CPT

## 2021-05-05 ENCOUNTER — MEDICAL CORRESPONDENCE (OUTPATIENT)
Dept: HEALTH INFORMATION MANAGEMENT | Facility: CLINIC | Age: 51
End: 2021-05-05

## 2021-05-07 ENCOUNTER — HOSPITAL ENCOUNTER (OUTPATIENT)
Dept: GENERAL RADIOLOGY | Facility: CLINIC | Age: 51
Discharge: HOME | End: 2021-05-07
Admitting: RADIOLOGY
Payer: COMMERCIAL

## 2021-05-07 DIAGNOSIS — M54.2 NECK PAIN: ICD-10-CM

## 2021-05-07 PROCEDURE — 74220 X-RAY XM ESOPHAGUS 1CNTRST: CPT

## 2021-05-07 PROCEDURE — 255N000001 HC RX 255

## 2021-05-07 RX ADMIN — ANTACID/ANTIFLATULENT 4 G: 380; 550; 10; 10 GRANULE, EFFERVESCENT ORAL at 09:34

## 2021-05-10 ENCOUNTER — TRANSFERRED RECORDS (OUTPATIENT)
Dept: HEALTH INFORMATION MANAGEMENT | Facility: CLINIC | Age: 51
End: 2021-05-10

## 2021-05-29 ENCOUNTER — RECORDS - HEALTHEAST (OUTPATIENT)
Dept: ADMINISTRATIVE | Facility: CLINIC | Age: 51
End: 2021-05-29

## 2021-08-13 DIAGNOSIS — E11.9 TYPE 2 DIABETES MELLITUS WITHOUT COMPLICATION, WITHOUT LONG-TERM CURRENT USE OF INSULIN (H): Primary | ICD-10-CM

## 2021-08-13 RX ORDER — METFORMIN HCL 500 MG
TABLET, EXTENDED RELEASE 24 HR ORAL
Qty: 120 TABLET | Refills: 0 | Status: SHIPPED | OUTPATIENT
Start: 2021-08-13 | End: 2021-10-01

## 2021-08-13 NOTE — TELEPHONE ENCOUNTER
Pending Prescriptions:                       Disp   Refills    metFORMIN (GLUCOPHAGE-XR) 500 MG 24 hr tab*360 ta*3        Sig: TAKE 2 TABLETS TWICE A DAY WITH MEALS    Routing refill request to provider for review/approval because:  Lab Results   Component Value Date    A1C 8.0 03/23/2021    A1C 7.4 10/15/2020    A1C 8.6 01/28/2020    A1C 7.1 02/01/2019    A1C 8.5 07/17/2018

## 2021-08-13 NOTE — TELEPHONE ENCOUNTER
Patient's A1c was high at last office visit and he was advised to increase Amaryl and repeat A1c in 2months but no lab appointment made,    I have refilled medication , please advise patient to make lab only appointment and appt to see me in clinic 2 to 3 days after the labs.   and patient is due for diabetic check 09/21 .  Please schedule appointment for patient

## 2021-09-22 ENCOUNTER — LAB (OUTPATIENT)
Dept: LAB | Facility: CLINIC | Age: 51
End: 2021-09-22
Payer: COMMERCIAL

## 2021-09-22 DIAGNOSIS — E11.9 TYPE 2 DIABETES MELLITUS WITHOUT COMPLICATION, WITHOUT LONG-TERM CURRENT USE OF INSULIN (H): ICD-10-CM

## 2021-09-22 LAB — HBA1C MFR BLD: 8.2 % (ref 0–5.6)

## 2021-09-22 PROCEDURE — 36415 COLL VENOUS BLD VENIPUNCTURE: CPT

## 2021-09-22 PROCEDURE — 80061 LIPID PANEL: CPT

## 2021-09-22 PROCEDURE — 83036 HEMOGLOBIN GLYCOSYLATED A1C: CPT

## 2021-09-22 PROCEDURE — 80053 COMPREHEN METABOLIC PANEL: CPT

## 2021-09-23 LAB
ALBUMIN SERPL-MCNC: 4.4 G/DL (ref 3.4–5)
ALP SERPL-CCNC: 60 U/L (ref 40–150)
ALT SERPL W P-5'-P-CCNC: 74 U/L (ref 0–70)
ANION GAP SERPL CALCULATED.3IONS-SCNC: 4 MMOL/L (ref 3–14)
AST SERPL W P-5'-P-CCNC: 32 U/L (ref 0–45)
BILIRUB SERPL-MCNC: 0.7 MG/DL (ref 0.2–1.3)
BUN SERPL-MCNC: 11 MG/DL (ref 7–30)
CALCIUM SERPL-MCNC: 8.6 MG/DL (ref 8.5–10.1)
CHLORIDE BLD-SCNC: 101 MMOL/L (ref 94–109)
CHOLEST SERPL-MCNC: 153 MG/DL
CO2 SERPL-SCNC: 30 MMOL/L (ref 20–32)
CREAT SERPL-MCNC: 0.72 MG/DL (ref 0.66–1.25)
FASTING STATUS PATIENT QL REPORTED: YES
GFR SERPL CREATININE-BSD FRML MDRD: >90 ML/MIN/1.73M2
GLUCOSE BLD-MCNC: 153 MG/DL (ref 70–99)
HDLC SERPL-MCNC: 44 MG/DL
LDLC SERPL CALC-MCNC: 91 MG/DL
NONHDLC SERPL-MCNC: 109 MG/DL
POTASSIUM BLD-SCNC: 4.2 MMOL/L (ref 3.4–5.3)
PROT SERPL-MCNC: 7.5 G/DL (ref 6.8–8.8)
SODIUM SERPL-SCNC: 135 MMOL/L (ref 133–144)
TRIGL SERPL-MCNC: 91 MG/DL

## 2021-10-01 ENCOUNTER — OFFICE VISIT (OUTPATIENT)
Dept: INTERNAL MEDICINE | Facility: CLINIC | Age: 51
End: 2021-10-01
Payer: COMMERCIAL

## 2021-10-01 VITALS
HEIGHT: 67 IN | BODY MASS INDEX: 44.02 KG/M2 | OXYGEN SATURATION: 96 % | WEIGHT: 280.5 LBS | DIASTOLIC BLOOD PRESSURE: 80 MMHG | HEART RATE: 86 BPM | SYSTOLIC BLOOD PRESSURE: 128 MMHG | TEMPERATURE: 96.5 F

## 2021-10-01 DIAGNOSIS — E66.01 MORBID OBESITY (H): ICD-10-CM

## 2021-10-01 DIAGNOSIS — E11.9 TYPE 2 DIABETES MELLITUS WITHOUT COMPLICATION, WITHOUT LONG-TERM CURRENT USE OF INSULIN (H): Primary | ICD-10-CM

## 2021-10-01 DIAGNOSIS — R20.0 NUMBNESS OF TONGUE: ICD-10-CM

## 2021-10-01 DIAGNOSIS — R20.0 NUMBNESS OF LIP: ICD-10-CM

## 2021-10-01 DIAGNOSIS — I10 ESSENTIAL HYPERTENSION, BENIGN: ICD-10-CM

## 2021-10-01 DIAGNOSIS — Z23 NEED FOR PROPHYLACTIC VACCINATION AND INOCULATION AGAINST INFLUENZA: ICD-10-CM

## 2021-10-01 DIAGNOSIS — E78.2 MIXED HYPERLIPIDEMIA: ICD-10-CM

## 2021-10-01 PROCEDURE — 90682 RIV4 VACC RECOMBINANT DNA IM: CPT | Performed by: INTERNAL MEDICINE

## 2021-10-01 PROCEDURE — 90471 IMMUNIZATION ADMIN: CPT | Performed by: INTERNAL MEDICINE

## 2021-10-01 PROCEDURE — 99215 OFFICE O/P EST HI 40 MIN: CPT | Mod: 25 | Performed by: INTERNAL MEDICINE

## 2021-10-01 RX ORDER — METFORMIN HCL 500 MG
TABLET, EXTENDED RELEASE 24 HR ORAL
Qty: 360 TABLET | Refills: 1 | Status: SHIPPED | OUTPATIENT
Start: 2021-10-01 | End: 2022-05-20

## 2021-10-01 RX ORDER — SIMVASTATIN 20 MG
20 TABLET ORAL DAILY
Qty: 90 TABLET | Refills: 3 | Status: SHIPPED | OUTPATIENT
Start: 2021-10-01 | End: 2022-05-20

## 2021-10-01 RX ORDER — GLIMEPIRIDE 1 MG/1
2 TABLET ORAL
Qty: 180 TABLET | Refills: 1 | Status: SHIPPED | OUTPATIENT
Start: 2021-10-01 | End: 2022-05-05

## 2021-10-01 RX ORDER — LOSARTAN POTASSIUM 50 MG/1
75 TABLET ORAL DAILY
Qty: 135 TABLET | Refills: 1 | Status: SHIPPED | OUTPATIENT
Start: 2021-10-01 | End: 2022-05-20

## 2021-10-01 RX ORDER — HYDROCHLOROTHIAZIDE 25 MG/1
25 TABLET ORAL DAILY
Qty: 90 TABLET | Refills: 1 | Status: SHIPPED | OUTPATIENT
Start: 2021-10-01 | End: 2022-05-20

## 2021-10-01 ASSESSMENT — MIFFLIN-ST. JEOR: SCORE: 2075.97

## 2021-10-01 NOTE — NURSING NOTE
"/80   Pulse 86   Temp (!) 96.5  F (35.8  C) (Axillary)   Ht 1.702 m (5' 7\")   Wt 127.2 kg (280 lb 8 oz)   SpO2 96%   BMI 43.93 kg/m    Patient in for medication follow up.  Jayne Smith CMA    "

## 2021-10-01 NOTE — PROGRESS NOTES
"    Assessment & Plan        (E11.9) Type 2 diabetes mellitus without complication, without long-term current use of insulin (H)  (primary encounter diagnosis)  Comment: Diabetes not controlled A1c 8.2  Plan: Patient was out of Metformin for a few weeks and also not taking the glimepiride as directed, refilled metFORMIN (GLUCOPHAGE-XR) 500 MG 24 hr tablet, 2 tablets twice daily and  glimepiride (AMARYL) 1 MG tablet 2 tablets daily.  Continue to follow ADA diet regular exercise and repeat A1c in 2 months          (I10) Essential hypertension, benign  Comment blood pressure stable  Plan: losartan (COZAAR) 50 MG tablet,  hydrochlorothiazide (HYDRODIURIL) 25 MG tablet refilled as directed.explained clearly about the medication,insructions and side effects.            (E78.2) Mixed hyperlipidemia  Comment: Lipids stable  Plan: simvastatin (ZOCOR) 20 MG tablet refilled as directed.explained clearly about the medication,insructions and side effects.            (E66.01) Morbid obesity (H)  Plan:  -Discussed in detail about Diet,calorie intake,and importance of regular exercise,       (R20.0) Numbness of lip  (R20.0) Numbness of tongue  Plan: Rule out any intracranial etiology, will get MR Brain w/o Contrast.pt was told I will contact her after results and proceed accordingly.    Flu vaccine administered today               Review of the result(s) of each unique test - A1c, lipids, LFT   Prescription drug management  40 minutes spent on the date of the encounter doing chart review, history and exam, documentation and further activities per the note   956}     BMI:   Estimated body mass index is 43.93 kg/m  as calculated from the following:    Height as of this encounter: 1.702 m (5' 7\").    Weight as of this encounter: 127.2 kg (280 lb 8 oz).   Weight management plan: Discussed healthy diet and exercise guidelines      Return in about 2 months (around 12/1/2021) for Lab Work A1c .    Edna Younger MD  [facility] " [facility] JENNIFER Herrmann is a 51 year old who presents for the following health issues     HPI     Diabetes Follow-up    How often are you checking your blood sugar? A few times a week, 150-, patient was out of metformin for 2 to 3 weeks and then started back on regular Metformin, also was taking, glimepiride 1.5 mg daily instead of 2 mg   what time of day are you checking your blood sugars (select all that apply)?  Before meals  Have you had any blood sugars above 200?  Yes   Have you had any blood sugars below 70?  No    What symptoms do you notice when your blood sugar is low?  None    What concerns do you have today about your diabetes? None and Blood sugar is often over 200     Do you have any of these symptoms? (Select all that apply)  No numbness or tingling in feet.  No redness, sores or blisters on feet.  No complaints of excessive thirst.  No reports of blurry vision.  No significant changes to weight.      Hyperlipidemia Follow-Up      Are you regularly taking any medication or supplement to lower your cholesterol?   Yes- Simvastatin    Are you having muscle aches or other side effects that you think could be caused by your cholesterol lowering medication?  No    Hypertension Follow-up      Do you check your blood pressure regularly outside of the clinic? No     Are you following a low salt diet? Yes    Are your blood pressures ever more than 140 on the top number (systolic) OR more   than 90 on the bottom number (diastolic), for example 140/90? Yes    BP Readings from Last 2 Encounters:   03/23/21 130/82   10/19/20 (!) 150/88     Hemoglobin A1C (%)   Date Value   09/22/2021 8.2 (H)   03/23/2021 8.0 (H)   10/15/2020 7.4 (H)     LDL Cholesterol Calculated (mg/dL)   Date Value   09/22/2021 91   10/15/2020 87   01/28/2020 117 (H)       Patient also complains of on and off numbness has right side for both upper and lower lip and also right side of the tongue since few months, no other  numbness or tingling noted, no weakness in any extremities, no headaches.  Patient has history of cervical stenosis and has been seeing orthopedics      Past Medical History:   Diagnosis Date     Arthritis     Neck     Diabetes mellitus (H)     type 2     Dyspnea on exertion      Gastro-oesophageal reflux disease      Hypertension      Other chronic pain     neck pain     PONV (postoperative nausea and vomiting)      Sleep apnea     CPAP     SOB (shortness of breath) 4/26/2017       Current Outpatient Medications   Medication Sig Dispense Refill     blood glucose monitoring (ACCU-CHEK HOWARD PLUS) test strip by In Vitro route daily 100 each 1     blood glucose monitoring (ONE TOUCH DELICA) lancets Use to test blood sugars ONE times daily or as directed. 1 Box 3     cholecalciferol (VITAMIN  -D) 1000 UNITS capsule Take 1 capsule by mouth daily       glimepiride (AMARYL) 1 MG tablet Take 2 tablets (2 mg) by mouth every morning (before breakfast) 180 tablet 1     hydrochlorothiazide (HYDRODIURIL) 25 MG tablet Take 1 tablet (25 mg) by mouth daily 90 tablet 1     ibuprofen (ADVIL/MOTRIN) 600 MG tablet Take 1 tablet (600 mg) by mouth every 6 hours as needed for pain (mild) 30 tablet 0     losartan (COZAAR) 50 MG tablet Take 1.5 tablets (75 mg) by mouth daily 135 tablet 1     metFORMIN (GLUCOPHAGE-XR) 500 MG 24 hr tablet TAKE 2 TABLETS TWICE A DAY WITH MEALS 360 tablet 1     simvastatin (ZOCOR) 20 MG tablet Take 1 tablet (20 mg) by mouth daily at bedtime. 90 tablet 3         Review of Systems   CONSTITUTIONAL: NEGATIVE for fever, chills, change in weight  EYES: NEGATIVE for vision changes or irritation  ENT/MOUTH: Numbness in right side of the tongue and also right upper and lower lip  RESP: NEGATIVE for significant cough or SOB  CV: NEGATIVE for chest pain, palpitations or peripheral edema  MUSCULOSKELETAL: Chronic neck pain follows up with  Ortho  NEURO: Numbness right tongue and right upper and lower lip  ENDOCRINE:  "NEGATIVE for temperature intolerance, skin/hair changes  PSYCHIATRIC: NEGATIVE for changes in mood or affect      Objective    /80   Pulse 86   Temp (!) 96.5  F (35.8  C) (Axillary)   Ht 1.702 m (5' 7\")   Wt 127.2 kg (280 lb 8 oz)   SpO2 96%   BMI 43.93 kg/m    Body mass index is 43.93 kg/m .  Physical Exam   GENERAL:   alert and no distress  EYES: Eyes grossly normal to inspection, PERRL and conjunctivae and sclerae normal  HENT:no lip lesions  NECK: no adenopathy, no asymmetry, masses, or scars and thyroid normal to palpation  RESP: lungs clear to auscultation - no rales, rhonchi or wheezes  CV: regular rate and rhythm,   MS: no gross musculoskeletal defects noted, no edema  NEURO: no facial droop,.Normal strength and tone, mentation intact and speech normal  Diabetic foot exam: normal DP and PT pulses, no trophic changes or ulcerative lesions, normal sensory exam and normal monofilament exam      "

## 2021-10-10 ENCOUNTER — HOSPITAL ENCOUNTER (OUTPATIENT)
Dept: MRI IMAGING | Facility: CLINIC | Age: 51
Discharge: HOME | End: 2021-10-10
Attending: INTERNAL MEDICINE | Admitting: INTERNAL MEDICINE
Payer: COMMERCIAL

## 2021-10-10 DIAGNOSIS — R20.0 NUMBNESS OF LIP: ICD-10-CM

## 2021-10-10 DIAGNOSIS — R20.0 NUMBNESS OF TONGUE: ICD-10-CM

## 2021-10-10 PROCEDURE — 70551 MRI BRAIN STEM W/O DYE: CPT

## 2021-12-01 ENCOUNTER — NURSE TRIAGE (OUTPATIENT)
Dept: NURSING | Facility: CLINIC | Age: 51
End: 2021-12-01
Payer: COMMERCIAL

## 2021-12-01 NOTE — TELEPHONE ENCOUNTER
Montez is  scheduled for a Pfizer vaccination tomorrow.  At work last week was in close contact with someone  that had covid.  Montez got tested and was negative for covid. Montez is calling to make sure he can get the covid vaccine.  Montez currently denies any symptoms.  FNA advised to go get covid vaccine.    COVID 19 Nurse Triage Plan/Patient Instructions    Please be aware that novel coronavirus (COVID-19) may be circulating in the community. If you develop symptoms such as fever, cough, or SOB or if you have concerns about the presence of another infection including coronavirus (COVID-19), please contact your health care provider or visit [email or web address].     Disposition/Instructions    Home care recommended. Follow home care protocol based instructions.    Thank you for taking steps to prevent the spread of this virus.  o Limit your contact with others.  o Wear a simple mask to cover your cough.  o Wash your hands well and often.    Resources    [facility]: About COVID-19: [email or web address]    CDC: What to Do If You're Sick: www.cdc.gov/coronavirus/2019-ncov/about/steps-when-sick.html    CDC: Ending Home Isolation: www.cdc.gov/coronavirus/2019-ncov/hcp/disposition-in-home-patients.html     CDC: Caring for Someone: www.cdc.gov/coronavirus/2019-ncov/if-you-are-sick/care-for-someone.html     [facility]: Interim Guidance for Hospital Discharge to Home: www.health[facility]/diseases/coronavirus/hcp/hospdischarge.pdf    [facility] clinical trials (COVID-19 research studies): [email or web address]     Below are the COVID-19 hotlines at the [facility] ([facility]). Interpreters are available.   o For health questions: Call 101-728-4776 or 1-427.493.5695 (7 a.m. to 7 p.m.)  o For questions about schools and childcare: Call 030-735-8164 or 1-759.175.1823 (7 a.m. to 7 p.m.)

## 2021-12-02 ENCOUNTER — IMMUNIZATION (OUTPATIENT)
Dept: NURSING | Facility: CLINIC | Age: 51
End: 2021-12-02
Payer: COMMERCIAL

## 2021-12-02 PROCEDURE — 0004A PR COVID VAC PFIZER DIL RECON 30 MCG/0.3 ML IM: CPT

## 2021-12-02 PROCEDURE — 91300 PR COVID VAC PFIZER DIL RECON 30 MCG/0.3 ML IM: CPT

## 2022-01-19 ENCOUNTER — CARE COORDINATION (OUTPATIENT)
Dept: SLEEP MEDICINE | Facility: CLINIC | Age: 52
End: 2022-01-19
Payer: COMMERCIAL

## 2022-01-19 NOTE — PROGRESS NOTES
[facility] NPSG from 6/25/2004 found in Care Everywhere. Scanned into Procedures and forwarded to provider. Kim Barrios, CMA

## 2022-01-20 ASSESSMENT — ENCOUNTER SYMPTOMS
PARALYSIS: 0
NUMBNESS: 1
SMELL DISTURBANCE: 0
DIZZINESS: 0
STIFFNESS: 1
SINUS PAIN: 0
SEIZURES: 0
DISTURBANCES IN COORDINATION: 0
JOINT SWELLING: 0
BACK PAIN: 0
LOSS OF CONSCIOUSNESS: 0
MYALGIAS: 1
HEADACHES: 0
NECK PAIN: 1
MUSCLE WEAKNESS: 0
TINGLING: 1
ARTHRALGIAS: 1
WEAKNESS: 0
HOARSE VOICE: 1
TREMORS: 0
SPEECH CHANGE: 0
MEMORY LOSS: 0
MUSCLE CRAMPS: 0
SINUS CONGESTION: 0
NECK MASS: 0
TROUBLE SWALLOWING: 1
SORE THROAT: 0
TASTE DISTURBANCE: 0

## 2022-01-20 ASSESSMENT — SLEEP AND FATIGUE QUESTIONNAIRES
HOW LIKELY ARE YOU TO NOD OFF OR FALL ASLEEP WHILE WATCHING TV: SLIGHT CHANCE OF DOZING
HOW LIKELY ARE YOU TO NOD OFF OR FALL ASLEEP WHILE SITTING AND READING: SLIGHT CHANCE OF DOZING
HOW LIKELY ARE YOU TO NOD OFF OR FALL ASLEEP WHILE SITTING QUIETLY AFTER LUNCH WITHOUT ALCOHOL: SLIGHT CHANCE OF DOZING
HOW LIKELY ARE YOU TO NOD OFF OR FALL ASLEEP WHILE SITTING INACTIVE IN A PUBLIC PLACE: SLIGHT CHANCE OF DOZING
HOW LIKELY ARE YOU TO NOD OFF OR FALL ASLEEP WHILE SITTING AND TALKING TO SOMEONE: WOULD NEVER DOZE
HOW LIKELY ARE YOU TO NOD OFF OR FALL ASLEEP WHEN YOU ARE A PASSENGER IN A CAR FOR AN HOUR WITHOUT A BREAK: SLIGHT CHANCE OF DOZING
HOW LIKELY ARE YOU TO NOD OFF OR FALL ASLEEP WHILE LYING DOWN TO REST IN THE AFTERNOON WHEN CIRCUMSTANCES PERMIT: MODERATE CHANCE OF DOZING
HOW LIKELY ARE YOU TO NOD OFF OR FALL ASLEEP IN A CAR, WHILE STOPPED FOR A FEW MINUTES IN TRAFFIC: SLIGHT CHANCE OF DOZING

## 2022-01-21 NOTE — TELEPHONE ENCOUNTER
RECORDS RECEIVED FROM: Internal   REASON FOR VISIT: Numbness of face   Date of Appt: 5/4/22   NOTES (FOR ALL VISITS) STATUS DETAILS   OFFICE NOTE from referring provider Internal Dr Bishop @ [facility]:  11/15/21 encounter  10/1/21   OFFICE NOTE from other specialist N/A    DISCHARGE SUMMARY from hospital N/A    DISCHARGE REPORT from the ER N/A    OPERATIVE REPORT N/A    MEDICATION LIST Internal    IMAGING  (FOR ALL VISITS)     EMG N/A    EEG N/A    LUMBAR PUNCTURE N/A    MIRELA SCAN N/A    ULTRASOUND (CAROTID BILAT) *VASCULAR* N/A    MRI (HEAD, NECK, SPINE) Internal [facility]:  MRI Brain 10/10/21   CT (HEAD, NECK, SPINE) N/A

## 2022-01-26 ENCOUNTER — VIRTUAL VISIT (OUTPATIENT)
Dept: SLEEP MEDICINE | Facility: CLINIC | Age: 52
End: 2022-01-26
Attending: INTERNAL MEDICINE
Payer: COMMERCIAL

## 2022-01-26 VITALS — BODY MASS INDEX: 43.95 KG/M2 | WEIGHT: 280 LBS | HEIGHT: 67 IN

## 2022-01-26 DIAGNOSIS — G47.33 OSA (OBSTRUCTIVE SLEEP APNEA): Primary | ICD-10-CM

## 2022-01-26 PROCEDURE — 99203 OFFICE O/P NEW LOW 30 MIN: CPT | Mod: 95 | Performed by: INTERNAL MEDICINE

## 2022-01-26 ASSESSMENT — MIFFLIN-ST. JEOR: SCORE: 2073.7

## 2022-01-26 ASSESSMENT — PAIN SCALES - GENERAL: PAINLEVEL: NO PAIN (0)

## 2022-01-26 NOTE — PROGRESS NOTES
Montez is a 51 year old who is being evaluated via a billable video visit.      How would you like to obtain your AVS? MyChart  If the video visit is dropped, the invitation should be resent by: Text to cell phone: [phone]   Will anyone else be joining your video visit? No    Video Start Time: 10:16 AM  Video-Visit Details    Type of service:  Video Visit    Video End Time:10:33 AM    Originating Location (pt. Location): Home    Distant Location (provider location):  [facility] [place]     Platform used for Video Visit: Doximity     Medication and allergies have been reviewed.       MIRANDA Masters     Sleep Consultation:    Date on this visit: 1/26/2022    Montez Valerio is sent by Edna Younger for a sleep consultation regarding sleep apnea.    Primary Physician: Edna Younger     Montez Valerio presents to clinic for management of sleep apnea.     His medical history is significant for HTN & DM-2.     Patient was previously diagnosed with obstructive sleep apnea and evaluation was performed at [facility]. A detailed report of his sleep study is not available. Interpretation summary from 6/25/2004 states that his apnea index was 69/h. This report does not clearly specify apnea-hypopnea index or other details. Patient was started on CPAP therapy after his sleep study and responded well to treatment. Daytime sleepiness resolved on therapy.    Overall, he rates the experience with PAP as ok. The mask is comfortable. The mask is not leaking.  He is not snoring with the mask on. He is not having gasp arousals.  He is not having significant oral/nasal dryness. The pressure settings are comfortable.     He uses nasal pillows.     He does feel rested in the morning.    Respironics    Auto-PAP 10.5-14 cmH2O download:  30/30 total days of use. 0 nonuse days.  Average use 6 hours 58 minutes per day.  100% days with >4 hours use.  Large leak 24 secs per day average.  CPAP 90% pressure 12.2cm. 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     Montez goes to sleep at 1:00 AM during the week. He wakes up at 7:00 AM without an alarm. He falls asleep in 20 minutes.  Montez denies difficulty falling asleep.  He wakes up 0-1 times a night for 5 minutes before falling back to sleep.  Montez wakes up to go to the bathroom.  Patient gets an average of 6 hours of sleep per night.     He denies no morning headaches. Montez denies any sleep walking, sleep talking and dream enactment.    Montez denies difficulty breathing through his nose.      Patient's [place] Sleepiness score 8/24 consistent with no daytime sleepiness.      Montez naps 0 times per week . He takes no inadvertant naps.  He denies closing eyes, dozing and falling asleep while driving. Patient was counseled on the importance of driving while alert, to pull over if drowsy, or nap before getting into the vehicle if sleepy.    Allergies:    Allergies   Allergen Reactions     No Known Drug Allergies        Medications:    Current Outpatient Medications   Medication Sig Dispense Refill     cholecalciferol (VITAMIN  -D) 1000 UNITS capsule Take 1 capsule by mouth daily       glimepiride (AMARYL) 1 MG tablet Take 2 tablets (2 mg) by mouth every morning (before breakfast) 180 tablet 1     hydrochlorothiazide (HYDRODIURIL) 25 MG tablet Take 1 tablet (25 mg) by mouth daily 90 tablet 1     ibuprofen (ADVIL/MOTRIN) 600 MG tablet Take 1 tablet (600 mg) by mouth every 6 hours as needed for pain (mild) 30 tablet 0     losartan (COZAAR) 50 MG tablet Take 1.5 tablets (75 mg) by mouth daily 135 tablet 1     metFORMIN (GLUCOPHAGE-XR) 500 MG 24 hr tablet TAKE 2 TABLETS TWICE A DAY WITH MEALS 360 tablet 1     simvastatin (ZOCOR) 20 MG tablet Take 1 tablet (20 mg) by mouth daily at bedtime. 90 tablet 3     blood glucose monitoring (ACCU-CHEK HOWARD PLUS) test strip by In Vitro route daily 100 each 1     blood glucose  monitoring (ONE TOUCH DELICA) lancets Use to test blood sugars ONE times daily or as directed. 1 Box 3       Problem List:  Patient Active Problem List    Diagnosis Date Noted     SOB (shortness of breath) 04/26/2017     Priority: Medium     Chest pressure 04/26/2017     Priority: Medium     Essential hypertension, benign 11/08/2016     Priority: Medium     Morbid obesity (H) 10/25/2015     Priority: Medium     Type 2 diabetes mellitus without complication (H) 10/31/2010     Priority: Medium     HYPERLIPIDEMIA LDL GOAL <100 10/31/2010     Priority: Medium     Mixed hyperlipidemia 09/27/2007     Priority: Medium     iamdcLUMB/LUMBOSAC DISC DEGEN 01/03/2006     Priority: Medium     Esophageal reflux 04/30/2004     Priority: Medium        Past Medical/Surgical History:  Past Medical History:   Diagnosis Date     Arthritis     Neck     Diabetes mellitus (H)     type 2     Dyspnea on exertion      Gastro-oesophageal reflux disease      Hypertension      Other chronic pain     neck pain     PONV (postoperative nausea and vomiting)      Sleep apnea     CPAP     SOB (shortness of breath) 4/26/2017     Past Surgical History:   Procedure Laterality Date     ARTHROSCOPY KNEE  2/12/2014    Procedure: ARTHROSCOPY KNEE;  Right Knee Arthroscopy, Removal of Loose Body, Partial Lateral Meniscectomy  ;  Surgeon: Amado Rondon MD;  Location: [facility] OR     AXILLARY SURGERY      Mass removed left     HC KNEE SCOPE, DIAGNOSTIC  1984    Arthroscopy, Knee right     HERNIORRHAPHY EPIGASTRIC N/A 7/18/2018    Procedure: HERNIORRHAPHY EPIGASTRIC;  open epigastric hernia repair with mesh;  Surgeon: Chastity Rios MD;  Location: [facility] OR     IR CERVICAL EPIDURAL STEROID INJECTION  12/19/2005     IR CERVICAL EPIDURAL STEROID INJECTION  1/6/2006     IR CERVICAL EPIDURAL STEROID INJECTION  2/20/2006     LAPAROSCOPIC CHOLECYSTECTOMY  12/21/2012    Procedure: LAPAROSCOPIC CHOLECYSTECTOMY;  LAPAROSCOPIC CHOLECYSTECTOMY;  Surgeon: Alton Riggs  "MD MAMIE;  Location: [facility] OR     wisdom teeth[         Social History:  Social History     Socioeconomic History     Marital status: Single     Spouse name: Not on file     Number of children: Not on file     Years of education: Not on file     Highest education level: Not on file   Occupational History     Not on file   Tobacco Use     Smoking status: Former Smoker     Types: Cigars, cigarillos or filtered cigars     Smokeless tobacco: Never Used     Tobacco comment: occ. cigar   Substance and Sexual Activity     Alcohol use: Yes     Comment: 4-6 drinks, 1-2 nights week, beer/liquor     Drug use: No     Sexual activity: Not Currently   Other Topics Concern     Parent/sibling w/ CABG, MI or angioplasty before 65F 55M? No   Social History Narrative     Not on file     Social Determinants of Health     Financial Resource Strain: Not on file   Food Insecurity: Not on file   Transportation Needs: Not on file   Physical Activity: Not on file   Stress: Not on file   Social Connections: Not on file   Intimate Partner Violence: Not on file   Housing Stability: Not on file       Family History:  Family History   Problem Relation Age of Onset     Lipids Mother      Cerebrovascular Disease Mother      Lipids Father      Gallbladder Disease Father      Cerebrovascular Disease Father      C.A.D. Maternal Grandfather         MI     C.A.D. Paternal Grandfather         MI     Cancer Paternal Grandmother         Pancreatic     Alcohol/Drug Sister      Psychotic Disorder Sister      Mental Illness Sister      Gallbladder Disease Sister      Physical Examination:  Vitals: Ht 1.702 m (5' 7\")   Wt 127 kg (280 lb)   BMI 43.85 kg/m    BMI= Body mass index is 43.85 kg/m .         Hunter Total Score 1/20/2022   Total score - Hunter 8       EFREN Total Score: 14 (01/20/22 1219)    GENERAL APPEARANCE: healthy, alert and no distress  RESP: Negative for cough or dyspnea  NEURO: mentation intact and speech normal  PSYCH: mentation appears normal " and affect normal/bright    Impression/Plan:    1. Severe obstructive sleep apnea    Patient has severe obstructive sleep apnea which was diagnosed in 2004 at [facility] [place]. Patient reports that he is at least 50 pounds heavier than his baseline sleep study. He has been on successful auto CPAP therapy with adequate resolution of his sleep apnea symptoms. I reviewed download data from his device which shows regular compliance and normal residual AHI, confirming adequate treatment.    He has a Theo RespirVidiowikis CPAP device which is under  recall. Due to severity of his disease and symptom burden from untreated sleep apnea, patient has decided to continue the affected device. He has registered his device with [email or web address]. Due to age of his device, he is in need of a replacement. I will continue current therapy settings and will provide him with a prescription for replacing his auto CPAP device.    Plan:     - Continue auto PAP therapy     Obstructive sleep apnea reviewed.  CPAP download reviewed.   Complications of untreated sleep apnea were reviewed.    I spent a total of 30 minutes for this appointment on this date of service which include time spent before, during and after the visit for chart review, patient care, counseling and coordination of care.    Dr. Kwabena Mckeon     CC: Edna Younger

## 2022-01-26 NOTE — PATIENT INSTRUCTIONS
Your BMI is Body mass index is 43.85 kg/m .  Weight management is a personal decision.  If you are interested in exploring weight loss strategies, the following discussion covers the approaches that may be successful. Body mass index (BMI) is one way to tell whether you are at a healthy weight, overweight, or obese. It measures your weight in relation to your height.  A BMI of 18.5 to 24.9 is in the healthy range. A person with a BMI of 25 to 29.9 is considered overweight, and someone with a BMI of 30 or greater is considered obese. More than two-thirds of American adults are considered overweight or obese.  Being overweight or obese increases the risk for further weight gain. Excess weight may lead to heart disease and diabetes.  Creating and following plans for healthy eating and physical activity may help you improve your health.  Weight control is part of healthy lifestyle and includes exercise, emotional health, and healthy eating habits. Careful eating habits lifelong are the mainstay of weight control. Though there are significant health benefits from weight loss, long-term weight loss with diet alone may be very difficult to achieve- studies show long-term success with dietary management in less than 10% of people. Attaining a healthy weight may be especially difficult to achieve in those with severe obesity. In some cases, medications, devices and surgical management might be considered.  What can you do?  If you are overweight or obese and are interested in methods for weight loss, you should discuss this with your provider.     Consider reducing daily calorie intake by 500 calories.     Keep a food journal.     Avoiding skipping meals, consider cutting portions instead.    Diet combined with exercise helps maintain muscle while optimizing fat loss. Strength training is particularly important for building and maintaining muscle mass. Exercise helps reduce stress, increase energy, and improves fitness.  Increasing exercise without diet control, however, may not burn enough calories to loose weight.       Start walking three days a week 10-20 minutes at a time    Work towards walking thirty minutes five days a week     Eventually, increase the speed of your walking for 1-2 minutes at time    And look into health and wellness programs that may be available through your health insurance provider, employer, local community center, or frandy club.

## 2022-01-28 NOTE — NURSING NOTE
DME orders have been automatically faxed to [facility].  Kim Barrios, CMA

## 2022-03-23 ENCOUNTER — TRANSFERRED RECORDS (OUTPATIENT)
Dept: HEALTH INFORMATION MANAGEMENT | Facility: CLINIC | Age: 52
End: 2022-03-23
Payer: COMMERCIAL

## 2022-03-23 LAB — RETINOPATHY: NEGATIVE

## 2022-04-09 ENCOUNTER — HEALTH MAINTENANCE LETTER (OUTPATIENT)
Age: 52
End: 2022-04-09

## 2022-05-02 DIAGNOSIS — I10 ESSENTIAL HYPERTENSION, BENIGN: ICD-10-CM

## 2022-05-02 DIAGNOSIS — E11.9 TYPE 2 DIABETES MELLITUS WITHOUT COMPLICATION, WITHOUT LONG-TERM CURRENT USE OF INSULIN (H): ICD-10-CM

## 2022-05-02 NOTE — TELEPHONE ENCOUNTER
[facility] is asking for losartan/hct tab 50-12.5 NOT just losartan as is in the chart.    Arilela Paz [facility] [place]  [phone]

## 2022-05-04 ENCOUNTER — TELEPHONE (OUTPATIENT)
Dept: NEUROLOGY | Facility: CLINIC | Age: 52
End: 2022-05-04

## 2022-05-04 ENCOUNTER — PRE VISIT (OUTPATIENT)
Dept: NEUROLOGY | Facility: CLINIC | Age: 52
End: 2022-05-04

## 2022-05-04 ENCOUNTER — OFFICE VISIT (OUTPATIENT)
Dept: NEUROLOGY | Facility: CLINIC | Age: 52
End: 2022-05-04
Payer: COMMERCIAL

## 2022-05-04 VITALS
BODY MASS INDEX: 44.48 KG/M2 | DIASTOLIC BLOOD PRESSURE: 85 MMHG | OXYGEN SATURATION: 97 % | RESPIRATION RATE: 16 BRPM | WEIGHT: 284 LBS | SYSTOLIC BLOOD PRESSURE: 154 MMHG | HEART RATE: 98 BPM

## 2022-05-04 DIAGNOSIS — M54.2 NECK PAIN: Primary | ICD-10-CM

## 2022-05-04 DIAGNOSIS — R20.0 RIGHT FACIAL NUMBNESS: ICD-10-CM

## 2022-05-04 PROCEDURE — 99205 OFFICE O/P NEW HI 60 MIN: CPT | Mod: GC | Performed by: PSYCHIATRY & NEUROLOGY

## 2022-05-04 NOTE — TELEPHONE ENCOUNTER
Routing refill request to provider for review/approval because:  See message below and advise.  Pended requested medication, but due to dosage, patient would have to take 1.5 tablets.  Please advise, thanks.

## 2022-05-04 NOTE — LETTER
5/4/2022       RE: Montez Valerio  [address] [place] [address]     Dear Colleague,    Thank you for referring your patient, Montez Valerio, to the [facility] [place] [facility]. Please see a copy of my visit note below.    Chief Complaint: numbness of the right face and tongue     History of Present Illness:    Montez Valerio is a 52 year old man who we are seeing at the request of Dr. Younger for evaluation of intermittent numbness of the right lower face, teeth, and tongue. Onset was 15 years ago. He recalls developing a sensation of tightness and numbness over the full face. For many years it improved but then came back about 6 months ago. His face is not completely numb but he feels like he has less sensation in the right face (ear to chin, occasionally in sinus - intermittent), teeth/gums and tongue. The sensation is there at a low level all the time but varies in intensity (did start as intermittent for the first week, then constant). No significant trauma to the face/neck recently (highschool football) or recent colds/infections. Denies pain/tingling/burning.  No dysarthria, dysphagia, diplopia, ptosis, hearing change, vertigo. He reports chronic neck pain. States he has seen orthopedic and neurosurgeons in the past regarding his neck, last about a year ago and stated he had a relatively normal MRI Cpsine. Has a history, since high school, of headaches, stingers, and numbness in his fingers. This improved with activity for the most part, until he developed too much muscle stiffness and symptoms returned. Has not gotten his weekly massages since COVID and his ibuprofen (1200-2400mg daily for 15 years) has not been working as well.     Prior pertinent laboratory work-up:  9/2021: HbA1c 8.2    Prior pertinent radiology work-up:  10/21: MRI Brain w/o con reported as normal    Prior electrophysiologic work-up:  Nerve conduction  studies/EMG : none    Past Medical History:   Past Medical History:   Diagnosis Date     Arthritis     Neck     Diabetes mellitus (H)     type 2     Dyspnea on exertion      Gastro-oesophageal reflux disease      Hypertension      Other chronic pain     neck pain     PONV (postoperative nausea and vomiting)      Sleep apnea     CPAP     SOB (shortness of breath) 4/26/2017     Past Surgical History:  Past Surgical History:   Procedure Laterality Date     ARTHROSCOPY KNEE  2/12/2014    Procedure: ARTHROSCOPY KNEE;  Right Knee Arthroscopy, Removal of Loose Body, Partial Lateral Meniscectomy  ;  Surgeon: Amado Rondon MD;  Location: [facility]     AXILLARY SURGERY      Mass removed left     HC KNEE SCOPE, DIAGNOSTIC  1984    Arthroscopy, Knee right     HERNIORRHAPHY EPIGASTRIC N/A 7/18/2018    Procedure: HERNIORRHAPHY EPIGASTRIC;  open epigastric hernia repair with mesh;  Surgeon: Chastity Rios MD;  Location: [facility] OR     IR CERVICAL EPIDURAL STEROID INJECTION  12/19/2005     IR CERVICAL EPIDURAL STEROID INJECTION  1/6/2006     IR CERVICAL EPIDURAL STEROID INJECTION  2/20/2006     LAPAROSCOPIC CHOLECYSTECTOMY  12/21/2012    Procedure: LAPAROSCOPIC CHOLECYSTECTOMY;  LAPAROSCOPIC CHOLECYSTECTOMY;  Surgeon: Alton Riggs MD;  Location: [facility] OR     wisdom teeth[       Family history:    Younger sister with neck issues (C7) with numbness/tingling    Social History:    He denies tobacco or illicit drug use. There is no known exposure to toxins or heavy metals.   Weekly alcohol use    Medical Allergies:  NKDA    Current Medications:    Current Outpatient Medications   Medication     cholecalciferol (VITAMIN  -D) 1000 UNITS capsule     glimepiride (AMARYL) 1 MG tablet     hydrochlorothiazide (HYDRODIURIL) 25 MG tablet     ibuprofen (ADVIL/MOTRIN) 600 MG tablet     losartan (COZAAR) 50 MG tablet     metFORMIN (GLUCOPHAGE-XR) 500 MG 24 hr tablet     simvastatin (ZOCOR) 20 MG tablet     No current facility-administered  medications for this visit.     Review of Systems: A complete review of systems was obtained and was negative except for what was noted above.    Physical examination:    BP (!) 154/85   Pulse 98   Resp 16   Wt 128.8 kg (284 lb)   SpO2 97%   BMI 44.48 kg/m       General Appearance: NAD    Skin: There are no rashes or other skin lesions.    Musculoskeletal:  There is no scoliosis, lordosis, kyphosis, pes cavus, or hammertoes. Negative Spurlings bilaterally    Neurologic examination:    Mental status:  Patient is alert, attentive, and oriented x 3.  Language is coherent and fluent without aphasia.  Memory, comprehension and ability to follow commands were intact.       Cranial nerves:  Pupils were round and reacted to light.  Extraocular movements were full. There was no face, jaw, palate or tongue weakness or atrophy. No changes in sensation to pin or light touch. Hearing was grossly intact.  Shoulder shrug was normal.       Motor exam: No atrophy or fasciculations.  Manual muscle testing revealed MRC grade 5/5 strength throughout including proximal and distal muscles of the arms and legs.     Complex motor skills: No tremor or ataxia    Sensory exam revealed normal perception of vibration, light touch, and pin proximally and distally in the arms and legs bilaterally except for decreased pin over the right big toe. Romberg sign was absent.    Gait: Narrow and stable.  He was able to walk on his heels, toes and tandem without any difficulty.       Deep tendon reflexes:   Right Left   Triceps 0 0   Biceps 1 1   Brachioradialis 1 1   Knee jerk 1 1   Ankle jerk 1 1   Plantar responses were flexor bilaterally.      Assessment:    Montez Valerio is a 52 year old man with numbness of the right lower face, teeth, and tongue of unclear clinical cause. His exam is normal, which is reassuring. Innervation to the anterior tongue and lower face is by the mandibular/maxillary branch of the  trigeminal nerve. The mandibular  branch also innervates the muscles of mastication, which he does not endorse any problems with. He had an MRI brain without contrast previously that was unremarkable for mass lesions that could be affecting the nerve, if symptoms worsen over time would be beneficial to obtain a repeat MRI brain with contrast to investigate inflammatory processes. Additionally, could have some involvement of C2/3 that could be contributing to his overall symptoms; however, he reports an unremarkable MRI in the last year from orthopedics, will attempt to obtain those images. Discussed that we can obtain an EMG to look at the health of his nerves, specifically the CN5/7 that are responsible for sensation and movement of the face. He does have a history of diabetes, would not be unexpected that we discover a mild length-dependent neuropathy, but this is unlikely to be the cause of his current symptoms. Overall, provided reassurance that while we may not find a cause to his symptoms, would not expect them to significant change or worsen over time.     Plan:      1. Nerve conduction studies/EMG with blink. Will assess trigeminal with blink and also perform a study of the right upper limb to look for cervical radic.  2. Will work on obtaining MR images from outside orthopedics. If face symptoms change may need to obtain MRI with contrast, but considering chronicity of the symptoms will hold off on that for now.   3. Physical therapy referral provided for neck pain  4. Pain management referral provided for management of chronic musculoskeletal neck pain  5. Will discuss results at time of EMG and if further work-up is indicated.     Seen and discussed with Dr. Ch. His addendum follows    Jayne Espinoza MD  Neuromuscular Fellow  ---  ADDENDUM:  I personally interviewed and examined the patient. I agree with the history, physical, assessment, and plan as documented above. Changes to the physical examination, assessment and plan have been  incorporated into the note by myself, as to make it a single cohesive document.    Danny Ch MD

## 2022-05-04 NOTE — TELEPHONE ENCOUNTER
LVM for patient that [place] location for the pain management referral can be called to schedule. They can see the orders and confirmed pt can call [phone] to schedule at his preferred location.

## 2022-05-04 NOTE — PROGRESS NOTES
Chief Complaint: numbness of the right face and tongue     History of Present Illness:    Montez Valerio is a 52 year old man who we are seeing at the request of Dr. Younger for evaluation of intermittent numbness of the right lower face, teeth, and tongue. Onset was 15 years ago. He recalls developing a sensation of tightness and numbness over the full face. For many years it improved but then came back about 6 months ago. His face is not completely numb but he feels like he has less sensation in the right face (ear to chin, occasionally in sinus - intermittent), teeth/gums and tongue. The sensation is there at a low level all the time but varies in intensity (did start as intermittent for the first week, then constant). No significant trauma to the face/neck recently (highschool football) or recent colds/infections. Denies pain/tingling/burning.  No dysarthria, dysphagia, diplopia, ptosis, hearing change, vertigo. He reports chronic neck pain. States he has seen orthopedic and neurosurgeons in the past regarding his neck, last about a year ago and stated he had a relatively normal MRI Cpsine. Has a history, since high school, of headaches, stingers, and numbness in his fingers. This improved with activity for the most part, until he developed too much muscle stiffness and symptoms returned. Has not gotten his weekly massages since COVID and his ibuprofen (1200-2400mg daily for 15 years) has not been working as well.     Prior pertinent laboratory work-up:  9/2021: HbA1c 8.2    Prior pertinent radiology work-up:  10/21: MRI Brain w/o con reported as normal    Prior electrophysiologic work-up:  Nerve conduction studies/EMG : none    Past Medical History:   Past Medical History:   Diagnosis Date     Arthritis     Neck     Diabetes mellitus (H)     type 2     Dyspnea on exertion      Gastro-oesophageal reflux disease      Hypertension      Other chronic pain     neck pain     PONV (postoperative nausea and vomiting)       Sleep apnea     CPAP     SOB (shortness of breath) 4/26/2017     Past Surgical History:  Past Surgical History:   Procedure Laterality Date     ARTHROSCOPY KNEE  2/12/2014    Procedure: ARTHROSCOPY KNEE;  Right Knee Arthroscopy, Removal of Loose Body, Partial Lateral Meniscectomy  ;  Surgeon: Amado Rondon MD;  Location: [facility] OR     AXILLARY SURGERY      Mass removed left     HC KNEE SCOPE, DIAGNOSTIC  1984    Arthroscopy, Knee right     HERNIORRHAPHY EPIGASTRIC N/A 7/18/2018    Procedure: HERNIORRHAPHY EPIGASTRIC;  open epigastric hernia repair with mesh;  Surgeon: Chastity Rios MD;  Location: [facility] OR     IR CERVICAL EPIDURAL STEROID INJECTION  12/19/2005     IR CERVICAL EPIDURAL STEROID INJECTION  1/6/2006     IR CERVICAL EPIDURAL STEROID INJECTION  2/20/2006     LAPAROSCOPIC CHOLECYSTECTOMY  12/21/2012    Procedure: LAPAROSCOPIC CHOLECYSTECTOMY;  LAPAROSCOPIC CHOLECYSTECTOMY;  Surgeon: Alton Riggs MD;  Location: [facility] OR     wisdom teeth[       Family history:    Younger sister with neck issues (C7) with numbness/tingling    Social History:    He denies tobacco or illicit drug use. There is no known exposure to toxins or heavy metals.   Weekly alcohol use    Medical Allergies:  NKDA    Current Medications:    Current Outpatient Medications   Medication     cholecalciferol (VITAMIN  -D) 1000 UNITS capsule     glimepiride (AMARYL) 1 MG tablet     hydrochlorothiazide (HYDRODIURIL) 25 MG tablet     ibuprofen (ADVIL/MOTRIN) 600 MG tablet     losartan (COZAAR) 50 MG tablet     metFORMIN (GLUCOPHAGE-XR) 500 MG 24 hr tablet     simvastatin (ZOCOR) 20 MG tablet     No current facility-administered medications for this visit.     Review of Systems: A complete review of systems was obtained and was negative except for what was noted above.    Physical examination:    BP (!) 154/85   Pulse 98   Resp 16   Wt 128.8 kg (284 lb)   SpO2 97%   BMI 44.48 kg/m       General Appearance: NAD    Skin: There are  no rashes or other skin lesions.    Musculoskeletal:  There is no scoliosis, lordosis, kyphosis, pes cavus, or hammertoes. Negative Spurlings bilaterally    Neurologic examination:    Mental status:  Patient is alert, attentive, and oriented x 3.  Language is coherent and fluent without aphasia.  Memory, comprehension and ability to follow commands were intact.       Cranial nerves:  Pupils were round and reacted to light.  Extraocular movements were full. There was no face, jaw, palate or tongue weakness or atrophy. No changes in sensation to pin or light touch. Hearing was grossly intact.  Shoulder shrug was normal.       Motor exam: No atrophy or fasciculations.  Manual muscle testing revealed MRC grade 5/5 strength throughout including proximal and distal muscles of the arms and legs.     Complex motor skills: No tremor or ataxia    Sensory exam revealed normal perception of vibration, light touch, and pin proximally and distally in the arms and legs bilaterally except for decreased pin over the right big toe. Romberg sign was absent.    Gait: Narrow and stable.  He was able to walk on his heels, toes and tandem without any difficulty.       Deep tendon reflexes:   Right Left   Triceps 0 0   Biceps 1 1   Brachioradialis 1 1   Knee jerk 1 1   Ankle jerk 1 1   Plantar responses were flexor bilaterally.      Assessment:    Montez Valerio is a 52 year old man with numbness of the right lower face, teeth, and tongue of unclear clinical cause. His exam is normal, which is reassuring. Innervation to the anterior tongue and lower face is by the mandibular/maxillary branch of the  trigeminal nerve. The mandibular branch also innervates the muscles of mastication, which he does not endorse any problems with. He had an MRI brain without contrast previously that was unremarkable for mass lesions that could be affecting the nerve, if symptoms worsen over time would be beneficial to obtain a repeat MRI brain with contrast to  investigate inflammatory processes. Additionally, could have some involvement of C2/3 that could be contributing to his overall symptoms; however, he reports an unremarkable MRI in the last year from orthopedics, will attempt to obtain those images. Discussed that we can obtain an EMG to look at the health of his nerves, specifically the CN5/7 that are responsible for sensation and movement of the face. He does have a history of diabetes, would not be unexpected that we discover a mild length-dependent neuropathy, but this is unlikely to be the cause of his current symptoms. Overall, provided reassurance that while we may not find a cause to his symptoms, would not expect them to significant change or worsen over time.     Plan:      1. Nerve conduction studies/EMG with blink. Will assess trigeminal with blink and also perform a study of the right upper limb to look for cervical radic.  2. Will work on obtaining MR images from outside orthopedics. If face symptoms change may need to obtain MRI with contrast, but considering chronicity of the symptoms will hold off on that for now.   3. Physical therapy referral provided for neck pain  4. Pain management referral provided for management of chronic musculoskeletal neck pain  5. Will discuss results at time of EMG and if further work-up is indicated.     Seen and discussed with Dr. Ch. His addendum follows    Jayne Espinoza MD  Neuromuscular Fellow  ---  ADDENDUM:  I personally interviewed and examined the patient. I agree with the history, physical, assessment, and plan as documented above. Changes to the physical examination, assessment and plan have been incorporated into the note by myself, as to make it a single cohesive document.    Danny Ch MD

## 2022-05-05 ENCOUNTER — TELEPHONE (OUTPATIENT)
Dept: INTERNAL MEDICINE | Facility: CLINIC | Age: 52
End: 2022-05-05
Payer: COMMERCIAL

## 2022-05-05 RX ORDER — METFORMIN HCL 500 MG
TABLET, EXTENDED RELEASE 24 HR ORAL
Qty: 360 TABLET | Refills: 1 | OUTPATIENT
Start: 2022-05-05

## 2022-05-05 RX ORDER — LOSARTAN POTASSIUM AND HYDROCHLOROTHIAZIDE 12.5; 5 MG/1; MG/1
1 TABLET ORAL DAILY
OUTPATIENT
Start: 2022-05-05

## 2022-05-05 RX ORDER — GLIMEPIRIDE 1 MG/1
2 TABLET ORAL
Qty: 60 TABLET | Refills: 0 | Status: SHIPPED | OUTPATIENT
Start: 2022-05-05 | End: 2022-05-20 | Stop reason: ALTCHOICE

## 2022-05-05 RX ORDER — GLIMEPIRIDE 1 MG/1
2 TABLET ORAL
Qty: 180 TABLET | Refills: 1 | OUTPATIENT
Start: 2022-05-05

## 2022-05-05 RX ORDER — LOSARTAN POTASSIUM 50 MG/1
75 TABLET ORAL DAILY
Qty: 135 TABLET | Refills: 1 | OUTPATIENT
Start: 2022-05-05

## 2022-05-05 RX ORDER — HYDROCHLOROTHIAZIDE 25 MG/1
25 TABLET ORAL DAILY
Qty: 90 TABLET | Refills: 1 | OUTPATIENT
Start: 2022-05-05

## 2022-05-05 NOTE — TELEPHONE ENCOUNTER
Next 5 appointments (look out 90 days)    May 20, 2022 10:00 AM  (Arrive by 9:40 AM)  Provider Visit with Edna Younger MD  [facility] ([facility] ) [address] [facility] [address]  [phone]

## 2022-05-05 NOTE — TELEPHONE ENCOUNTER
Call to patient. States he has enough medications left except for Glimepiride. He is completely out of this and requesting a 30 day prescription to a local pharmacy.

## 2022-05-05 NOTE — TELEPHONE ENCOUNTER
Pt has appt in 2 wks.  Will refill at his appt.  Please check with pt if he is completely out of meds then will refill for 2 wks.

## 2022-05-17 ENCOUNTER — LAB (OUTPATIENT)
Dept: LAB | Facility: CLINIC | Age: 52
End: 2022-05-17
Payer: COMMERCIAL

## 2022-05-17 DIAGNOSIS — E11.9 TYPE 2 DIABETES MELLITUS WITHOUT COMPLICATION, WITHOUT LONG-TERM CURRENT USE OF INSULIN (H): ICD-10-CM

## 2022-05-17 LAB — HBA1C MFR BLD: 9.1 % (ref 0–5.6)

## 2022-05-17 PROCEDURE — 80048 BASIC METABOLIC PNL TOTAL CA: CPT

## 2022-05-17 PROCEDURE — 83036 HEMOGLOBIN GLYCOSYLATED A1C: CPT

## 2022-05-17 PROCEDURE — 84460 ALANINE AMINO (ALT) (SGPT): CPT

## 2022-05-17 PROCEDURE — 36415 COLL VENOUS BLD VENIPUNCTURE: CPT

## 2022-05-17 PROCEDURE — 84450 TRANSFERASE (AST) (SGOT): CPT

## 2022-05-18 LAB
ALT SERPL W P-5'-P-CCNC: 73 U/L (ref 0–70)
AST SERPL W P-5'-P-CCNC: 37 U/L (ref 0–45)

## 2022-05-20 ENCOUNTER — OFFICE VISIT (OUTPATIENT)
Dept: INTERNAL MEDICINE | Facility: CLINIC | Age: 52
End: 2022-05-20
Payer: COMMERCIAL

## 2022-05-20 VITALS
HEART RATE: 84 BPM | TEMPERATURE: 98.1 F | DIASTOLIC BLOOD PRESSURE: 87 MMHG | WEIGHT: 285 LBS | SYSTOLIC BLOOD PRESSURE: 135 MMHG | HEIGHT: 67 IN | RESPIRATION RATE: 22 BRPM | BODY MASS INDEX: 44.73 KG/M2 | OXYGEN SATURATION: 97 %

## 2022-05-20 DIAGNOSIS — I10 ESSENTIAL HYPERTENSION, BENIGN: ICD-10-CM

## 2022-05-20 DIAGNOSIS — E66.01 MORBID OBESITY (H): ICD-10-CM

## 2022-05-20 DIAGNOSIS — E78.5 HYPERLIPIDEMIA LDL GOAL <100: ICD-10-CM

## 2022-05-20 DIAGNOSIS — R74.8 ELEVATED LIVER ENZYMES: ICD-10-CM

## 2022-05-20 DIAGNOSIS — E11.9 TYPE 2 DIABETES MELLITUS WITHOUT COMPLICATION, WITHOUT LONG-TERM CURRENT USE OF INSULIN (H): Primary | ICD-10-CM

## 2022-05-20 DIAGNOSIS — Z12.11 SCREEN FOR COLON CANCER: ICD-10-CM

## 2022-05-20 LAB
ANION GAP SERPL CALCULATED.3IONS-SCNC: 8 MMOL/L (ref 3–14)
BUN SERPL-MCNC: 9 MG/DL (ref 7–30)
CALCIUM SERPL-MCNC: 9.1 MG/DL (ref 8.5–10.1)
CHLORIDE BLD-SCNC: 100 MMOL/L (ref 94–109)
CO2 SERPL-SCNC: 27 MMOL/L (ref 20–32)
CREAT SERPL-MCNC: 0.59 MG/DL (ref 0.66–1.25)
GFR SERPL CREATININE-BSD FRML MDRD: >90 ML/MIN/1.73M2
GLUCOSE BLD-MCNC: 248 MG/DL (ref 70–99)
POTASSIUM BLD-SCNC: 4.7 MMOL/L (ref 3.4–5.3)
SODIUM SERPL-SCNC: 135 MMOL/L (ref 133–144)

## 2022-05-20 PROCEDURE — 99214 OFFICE O/P EST MOD 30 MIN: CPT | Performed by: INTERNAL MEDICINE

## 2022-05-20 PROCEDURE — 82043 UR ALBUMIN QUANTITATIVE: CPT | Performed by: INTERNAL MEDICINE

## 2022-05-20 RX ORDER — SIMVASTATIN 20 MG
20 TABLET ORAL DAILY
Qty: 90 TABLET | Refills: 1 | Status: SHIPPED | OUTPATIENT
Start: 2022-05-20 | End: 2022-12-27 | Stop reason: ALTCHOICE

## 2022-05-20 RX ORDER — METFORMIN HCL 500 MG
TABLET, EXTENDED RELEASE 24 HR ORAL
Qty: 360 TABLET | Refills: 1 | Status: SHIPPED | OUTPATIENT
Start: 2022-05-20 | End: 2022-10-12

## 2022-05-20 RX ORDER — HYDROCHLOROTHIAZIDE 25 MG/1
25 TABLET ORAL DAILY
Qty: 90 TABLET | Refills: 1 | Status: SHIPPED | OUTPATIENT
Start: 2022-05-20 | End: 2022-10-12

## 2022-05-20 RX ORDER — LOSARTAN POTASSIUM 50 MG/1
75 TABLET ORAL DAILY
Qty: 135 TABLET | Refills: 1 | Status: SHIPPED | OUTPATIENT
Start: 2022-05-20 | End: 2022-12-27

## 2022-05-20 RX ORDER — GLIPIZIDE 10 MG/1
10 TABLET, FILM COATED, EXTENDED RELEASE ORAL DAILY
Qty: 90 TABLET | Refills: 0 | Status: SHIPPED | OUTPATIENT
Start: 2022-05-20 | End: 2022-08-02

## 2022-05-20 NOTE — PROGRESS NOTES
"  Assessment & Plan     (E11.9) Type 2 diabetes mellitus without complication, without long-term current use of insulin (H)  (primary encounter diagnosis)  Plan: A1c high, diabetes not under control, discontinue Amaryl, started on glipiZIDE (GLUCOTROL XL) 10 MG 24 hr tablet, daily as directed, continue  metFORMIN (GLUCOPHAGE XR) 500 MG 24 hr tablet as directed.explained clearly about the medication,insructions and side effects.  Continue ADA diet regular exercise and follow-up in clinic in 3 months  Check Albumin Random Urine Quantitative with Creat         Ratio, Basic metabolic panel,        (E66.01) Morbid obesity (H)  Plan:  -Discussed in detail about Diet,calorie intake,and importance of regular exercise       (I10) Essential hypertension, benign  Comment: Blood pressure stable  Plan: hydrochlorothiazide (HYDRODIURIL) 25 MG tablet,        losartan (COZAAR) 50 MG tablet refilled as directed.explained clearly about the medication,insructions and side effects.            (E78.5) Hyperlipidemia LDL goal <100  Plan: Refilled simvastatin (ZOCOR) 20 MG tablet  as directed.explained clearly about the medication,insructions and side effects.      (Z12.11) Screen for colon cancer  Plan: Adult Gastro Ref - Procedure Only            (R74.8) Elevated liver enzymes  Plan: Reduce alcohol intake, follow low-fat diet, will continue to monitor          Review of the result(s) of each unique test - A1c, microalbumin   Prescription drug management         BMI:   Estimated body mass index is 44.64 kg/m  as calculated from the following:    Height as of this encounter: 1.702 m (5' 7\").    Weight as of this encounter: 129.3 kg (285 lb).        Return in about 3 months (around 8/20/2022).    Edna Younger MD  [facility] JENNIFER Herrmann is a 52 year old who presents for the following health issues   : medication review.  History of Present Illness       Diabetes:   He presents for follow up of " diabetes.  He is checking home blood glucose a few times a week. He checks blood glucose before meals.  Blood glucose is sometimes over 200 and never under 70. He is aware of hypoglycemia symptoms including lethargy. He is concerned about blood sugar frequently over 200.  He is having numbness in feet.     He consumes 1 sweetened beverage(s) daily.He exercises with enough effort to increase his heart rate 20 to 29 minutes per day.  He exercises with enough effort to increase his heart rate 4 days per week.   He is taking medications regularly.       Diabetes Follow-up    How often are you checking your blood sugar? A few times a week BS over 200 at times  What time of day are you checking your blood sugars (select all that apply)?  Before meals  Have you had any blood sugars above 200?  Yes   Have you had any blood sugars below 70?  No    What symptoms do you notice when your blood sugar is low?  None    What concerns do you have today about your diabetes? Blood sugar is often over 200     Do you have any of these symptoms? (Select all that apply)  Numbness in feet      BP Readings from Last 2 Encounters:   05/20/22 135/87   05/04/22 (!) 154/85     Hemoglobin A1C POCT (%)   Date Value   03/23/2021 8.0 (H)   10/15/2020 7.4 (H)     Hemoglobin A1C (%)   Date Value   05/17/2022 9.1 (H)   09/22/2021 8.2 (H)     LDL Cholesterol Calculated (mg/dL)   Date Value   09/22/2021 91   10/15/2020 87   01/28/2020 117 (H)       Hyperlipidemia Follow-Up      Are you regularly taking any medication or supplement to lower your cholesterol?   Yes- simvastaitn     Are you having muscle aches or other side effects that you think could be caused by your cholesterol lowering medication?  No    Hypertension Follow-up      Do you check your blood pressure regularly outside of the clinic? Yes     Are you following a low salt diet? Yes    Are your blood pressures ever more than 140 on the top number (systolic) OR more   than 90 on the bottom  "number (diastolic), for example 140/90? Yes    Past Medical History:   Diagnosis Date     Arthritis     Neck     Diabetes mellitus (H)     type 2     Dyspnea on exertion      Gastro-oesophageal reflux disease      Hypertension      Other chronic pain     neck pain     PONV (postoperative nausea and vomiting)      Sleep apnea     CPAP     SOB (shortness of breath) 4/26/2017       Current Outpatient Medications   Medication Sig Dispense Refill     cholecalciferol (VITAMIN  -D) 1000 UNITS capsule Take 1 capsule by mouth daily       glipiZIDE (GLUCOTROL XL) 10 MG 24 hr tablet Take 1 tablet (10 mg) by mouth daily 90 tablet 0     hydrochlorothiazide (HYDRODIURIL) 25 MG tablet Take 1 tablet (25 mg) by mouth daily 90 tablet 1     ibuprofen (ADVIL/MOTRIN) 600 MG tablet Take 1 tablet (600 mg) by mouth every 6 hours as needed for pain (mild) 30 tablet 0     losartan (COZAAR) 50 MG tablet Take 1.5 tablets (75 mg) by mouth daily 135 tablet 1     metFORMIN (GLUCOPHAGE-XR) 500 MG 24 hr tablet TAKE 2 TABLETS TWICE A DAY WITH MEALS 360 tablet 1     simvastatin (ZOCOR) 20 MG tablet Take 1 tablet (20 mg) by mouth daily at bedtime. 90 tablet 3         Review of Systems   CONSTITUTIONAL: NEGATIVE for fever, chills, change in weight  EYES: NEGATIVE for vision changes or irritation  RESP: NEGATIVE for significant cough or SOB  CV: NEGATIVE for chest pain, palpitations or peripheral edema  ENDOCRINE: NEGATIVE for temperature intolerance, skin/hair changes  PSYCHIATRIC: NEGATIVE for changes in mood or affect      Objective    /87   Pulse 84   Temp 98.1  F (36.7  C)   Resp 22   Ht 1.702 m (5' 7\")   Wt 129.3 kg (285 lb)   SpO2 97%   BMI 44.64 kg/m    Body mass index is 44.64 kg/m .  Physical Exam   GENERAL: , alert and no distress  EYES: Eyes grossly normal to inspection, PERRL and conjunctivae and sclerae normal  RESP: lungs clear to auscultation - no rales, rhonchi or wheezes  CV: regular rate and rhythm, normal S1 S2,    MS: " no edema, no calf tenderness

## 2022-05-21 LAB
CREAT UR-MCNC: 42 MG/DL
MICROALBUMIN UR-MCNC: 6 MG/L
MICROALBUMIN/CREAT UR: 14.29 MG/G CR (ref 0–17)

## 2022-06-04 ENCOUNTER — HEALTH MAINTENANCE LETTER (OUTPATIENT)
Age: 52
End: 2022-06-04

## 2022-06-08 ENCOUNTER — OFFICE VISIT (OUTPATIENT)
Dept: NEUROLOGY | Facility: CLINIC | Age: 52
End: 2022-06-08
Payer: COMMERCIAL

## 2022-06-08 DIAGNOSIS — R20.0 RIGHT FACIAL NUMBNESS: ICD-10-CM

## 2022-06-08 DIAGNOSIS — M54.2 NECK PAIN: ICD-10-CM

## 2022-06-08 PROCEDURE — 95886 MUSC TEST DONE W/N TEST COMP: CPT | Mod: GC | Performed by: PSYCHIATRY & NEUROLOGY

## 2022-06-08 PROCEDURE — 95908 NRV CNDJ TST 3-4 STUDIES: CPT | Mod: GC | Performed by: PSYCHIATRY & NEUROLOGY

## 2022-06-08 NOTE — PROGRESS NOTES
[facility]  Electrodiagnostic Laboratory                 Department of Neurology                                                                                                         Test Date:  2022    Patient: Montez Valerio : 1970 Physician: Danny Ch MD   Sex: Male AGE: 52 year Ref Phys: Danny Ch MD   ID#: [id number]   Technician: JOSE Rodrigues     Clinical Information:  52 year old male with chronic history of intermittent numbness of the right lower face, teeth, and tongue. Query cranial nerve neuropathy vs radiculopathy      Techniques:  Motor and sensory conduction studies were done with surface recording electrodes. EMG was done with a concentric needle electrode.     Results:  Nerve conduction studies:  1. Right  median-D2 and ulnar-D5 sensory responses are normal.   2. Right median-APB and ulnar-ADM motor responses are normal.   3. Bilateral blink reflex responses are normal.     Needle EMG of selected proximal and distal right upper limb muscles was performed as tabulated below. No abnormal spontaneous activity was observed in the sampled muscles. Motor unit potential morphology and recruitment patterns were normal.     Interpretation:  This is a normal study. There is no electrophysiologic evidence of a focal neuropathy or cervical radiculopathy affecting the right upper limb or of a cranial neuropathy involving the Vth or VIIth cranial nerves on the basis of this study.     __________________________  Jayne Espinoza MD  Neuromuscular Fellow      I was present for all key portions of the NCS/EMG study. All data and waveforms personally reviewed. I agree with the results and interpretation as above.    Danny Ch MD  Department of Neurology          Nerve Conduction Studies  Motor Sites      Latency Amplitude Neg. Amp Diff Segment Distance Velocity Neg. Dur Neg Area Diff Temperature Comment   Site (ms) Norm (mV) Norm %  cm m/s Norm ms %   C    Right Median (APB) Motor   Wrist 3.2  < 4.4 10.4  > 5.0  Wrist-APB 8   5.3  32.8    Elbow 8.1 - 9.1 - -12.5 Elbow-Wrist 24 49  > 48 5.7 -10.8 32.7    Right Ulnar (ADM) Motor   Wrist 3.3  < 3.5 9.9  > 5.0  Wrist-ADM 8   5.2  32.7    Bel Elbow 7.3 - 9.5 - -4.0 Bel Elbow-Wrist 23 58  > 48 5.3 -5.3 32.7    Abv Elbow 8.9 - 9.3 - -2.1 Abv Elbow-Bel Elbow 10 63  > 48 5.3 -1.39 32.7      Sensory Sites      Onset Lat Peak Lat Amp (O-P) Amp (P-P) Segment Distance Velocity Temperature Comment   Site ms ms  V Norm  V  cm m/s Norm  C    Right Median Sensory   Wrist-Dig II 2.7 3.3 22  > 10 25 Wrist-Dig II 14 52  > 48 32.8    Right Ulnar Sensory   Wrist-Dig V 2.3 3.0 17  > 8 25 Wrist-Dig V 12.5 54  > 48 32.8      Blink     Trial NR R1 (ms) R2i (ms) R2c (ms) [id number]-G0i (ms) Comments   Norm  <13 <40 <41     Trial6 - R    11.7 37.8 39 2.2    Trial6 - L  11.4 39.4 40.2 0.6        Electromyography     Side Muscle Ins Act Fibs/PSW Fasc HF Amp Dur Poly Recrt Int Pat   Right Deltoid Nml None Nml 0 Nml Nml 0 Nml Nml   Right Biceps Nml None Nml 0 Nml Nml 0 Nml Nml   Right Triceps Nml None Nml 0 Nml Nml 0 Nml Nml   Right Pronator Teres Nml None Nml 0 Nml Nml 0 Nml Nml   Right FDI Nml None Nml 0 Nml Nml 0 Nml Nml         NCS Waveforms:    Motor         Sensory

## 2022-06-08 NOTE — LETTER
2022       RE: Montez Valerio  [address] [place] [address]     Dear Colleague,    Thank you for referring your patient, Montez Valerio, to the [facility]G [facility] [place] at [facility]. Please see a copy of my visit note below.                            [facility]  Electrodiagnostic Laboratory                 Department of Neurology                                                                                                         Test Date:  2022    Patient: Montez Valerio : 1970 Physician: Danny Ch MD   Sex: Male AGE: 52 year Ref Phys: Danny Ch MD   ID#: [id number]   Technician: JOSE Rodrigues     Clinical Information:  52 year old male with chronic history of intermittent numbness of the right lower face, teeth, and tongue. Query cranial nerve neuropathy vs radiculopathy      Techniques:  Motor and sensory conduction studies were done with surface recording electrodes. EMG was done with a concentric needle electrode.     Results:  Nerve conduction studies:  1. Right  median-D2 and ulnar-D5 sensory responses are normal.   2. Right median-APB and ulnar-ADM motor responses are normal.   3. Bilateral blink reflex responses are normal.     Needle EMG of selected proximal and distal right upper limb muscles was performed as tabulated below. No abnormal spontaneous activity was observed in the sampled muscles. Motor unit potential morphology and recruitment patterns were normal.     Interpretation:  This is a normal study. There is no electrophysiologic evidence of a focal neuropathy or cervical radiculopathy affecting the right upper limb or of a cranial neuropathy involving the Vth or VIIth cranial nerves on the basis of this study.     __________________________  Jayne Espinoza MD  Neuromuscular Fellow      I was present for all key portions of the NCS/EMG study. All data and waveforms personally  reviewed. I agree with the results and interpretation as above.    Danny Ch MD  Department of Neurology          Nerve Conduction Studies  Motor Sites      Latency Amplitude Neg. Amp Diff Segment Distance Velocity Neg. Dur Neg Area Diff Temperature Comment   Site (ms) Norm (mV) Norm %  cm m/s Norm ms %  C    Right Median (APB) Motor   Wrist 3.2  < 4.4 10.4  > 5.0  Wrist-APB 8   5.3  32.8    Elbow 8.1 - 9.1 - -12.5 Elbow-Wrist 24 49  > 48 5.7 -10.8 32.7    Right Ulnar (ADM) Motor   Wrist 3.3  < 3.5 9.9  > 5.0  Wrist-ADM 8   5.2  32.7    Bel Elbow 7.3 - 9.5 - -4.0 Bel Elbow-Wrist 23 58  > 48 5.3 -5.3 32.7    Abv Elbow 8.9 - 9.3 - -2.1 Abv Elbow-Bel Elbow 10 63  > 48 5.3 -1.39 32.7      Sensory Sites      Onset Lat Peak Lat Amp (O-P) Amp (P-P) Segment Distance Velocity Temperature Comment   Site ms ms  V Norm  V  cm m/s Norm  C    Right Median Sensory   Wrist-Dig II 2.7 3.3 22  > 10 25 Wrist-Dig II 14 52  > 48 32.8    Right Ulnar Sensory   Wrist-Dig V 2.3 3.0 17  > 8 25 Wrist-Dig V 12.5 54  > 48 32.8      Blink     Trial NR R1 (ms) R2i (ms) R2c (ms) [id number] (ms) Comments   Norm  <13 <40 <41     Trial6 - R    11.7 37.8 39 2.2    Trial6 - L  11.4 39.4 40.2 0.6        Electromyography     Side Muscle Ins Act Fibs/PSW Fasc HF Amp Dur Poly Recrt Int Pat   Right Deltoid Nml None Nml 0 Nml Nml 0 Nml Nml   Right Biceps Nml None Nml 0 Nml Nml 0 Nml Nml   Right Triceps Nml None Nml 0 Nml Nml 0 Nml Nml   Right Pronator Teres Nml None Nml 0 Nml Nml 0 Nml Nml   Right FDI Nml None Nml 0 Nml Nml 0 Nml Nml         NCS Waveforms:    Motor         Sensory                              Sincerely,    Danny Ch MD

## 2022-07-15 ENCOUNTER — HOSPITAL ENCOUNTER (OUTPATIENT)
Facility: CLINIC | Age: 52
Discharge: HOME | End: 2022-07-15
Attending: INTERNAL MEDICINE | Admitting: INTERNAL MEDICINE
Payer: COMMERCIAL

## 2022-07-15 VITALS
SYSTOLIC BLOOD PRESSURE: 137 MMHG | TEMPERATURE: 98.1 F | HEART RATE: 93 BPM | RESPIRATION RATE: 16 BRPM | WEIGHT: 285 LBS | OXYGEN SATURATION: 95 % | BODY MASS INDEX: 44.73 KG/M2 | HEIGHT: 67 IN | DIASTOLIC BLOOD PRESSURE: 82 MMHG

## 2022-07-15 LAB
COLONOSCOPY: NORMAL
GLUCOSE BLDC GLUCOMTR-MCNC: 169 MG/DL (ref 70–99)

## 2022-07-15 PROCEDURE — 45378 DIAGNOSTIC COLONOSCOPY: CPT | Performed by: INTERNAL MEDICINE

## 2022-07-15 PROCEDURE — 82962 GLUCOSE BLOOD TEST: CPT

## 2022-07-15 PROCEDURE — G0500 MOD SEDAT ENDO SERVICE >5YRS: HCPCS | Performed by: INTERNAL MEDICINE

## 2022-07-15 PROCEDURE — G0121 COLON CA SCRN NOT HI RSK IND: HCPCS | Performed by: INTERNAL MEDICINE

## 2022-07-15 PROCEDURE — 250N000011 HC RX IP 250 OP 636: Performed by: INTERNAL MEDICINE

## 2022-07-15 RX ORDER — NALOXONE HYDROCHLORIDE 0.4 MG/ML
0.4 INJECTION, SOLUTION INTRAMUSCULAR; INTRAVENOUS; SUBCUTANEOUS
Status: DISCONTINUED | OUTPATIENT
Start: 2022-07-15 | End: 2022-07-15 | Stop reason: HOSPADM

## 2022-07-15 RX ORDER — LIDOCAINE 40 MG/G
CREAM TOPICAL
Status: DISCONTINUED | OUTPATIENT
Start: 2022-07-15 | End: 2022-07-15 | Stop reason: HOSPADM

## 2022-07-15 RX ORDER — ATROPINE SULFATE 0.1 MG/ML
1 INJECTION INTRAVENOUS
Status: DISCONTINUED | OUTPATIENT
Start: 2022-07-15 | End: 2022-07-15 | Stop reason: HOSPADM

## 2022-07-15 RX ORDER — EPINEPHRINE 1 MG/ML
0.1 INJECTION, SOLUTION INTRAMUSCULAR; SUBCUTANEOUS
Status: DISCONTINUED | OUTPATIENT
Start: 2022-07-15 | End: 2022-07-15 | Stop reason: HOSPADM

## 2022-07-15 RX ORDER — ONDANSETRON 2 MG/ML
4 INJECTION INTRAMUSCULAR; INTRAVENOUS EVERY 6 HOURS PRN
Status: DISCONTINUED | OUTPATIENT
Start: 2022-07-15 | End: 2022-07-15 | Stop reason: HOSPADM

## 2022-07-15 RX ORDER — FLUMAZENIL 0.1 MG/ML
0.2 INJECTION, SOLUTION INTRAVENOUS
Status: DISCONTINUED | OUTPATIENT
Start: 2022-07-15 | End: 2022-07-15 | Stop reason: HOSPADM

## 2022-07-15 RX ORDER — ONDANSETRON 2 MG/ML
4 INJECTION INTRAMUSCULAR; INTRAVENOUS
Status: DISCONTINUED | OUTPATIENT
Start: 2022-07-15 | End: 2022-07-15 | Stop reason: HOSPADM

## 2022-07-15 RX ORDER — DIPHENHYDRAMINE HYDROCHLORIDE 50 MG/ML
25-50 INJECTION INTRAMUSCULAR; INTRAVENOUS
Status: DISCONTINUED | OUTPATIENT
Start: 2022-07-15 | End: 2022-07-15 | Stop reason: HOSPADM

## 2022-07-15 RX ORDER — NALOXONE HYDROCHLORIDE 0.4 MG/ML
0.2 INJECTION, SOLUTION INTRAMUSCULAR; INTRAVENOUS; SUBCUTANEOUS
Status: DISCONTINUED | OUTPATIENT
Start: 2022-07-15 | End: 2022-07-15 | Stop reason: HOSPADM

## 2022-07-15 RX ORDER — PROCHLORPERAZINE MALEATE 10 MG
10 TABLET ORAL EVERY 6 HOURS PRN
Status: DISCONTINUED | OUTPATIENT
Start: 2022-07-15 | End: 2022-07-15 | Stop reason: HOSPADM

## 2022-07-15 RX ORDER — FENTANYL CITRATE 0.05 MG/ML
50-100 INJECTION, SOLUTION INTRAMUSCULAR; INTRAVENOUS EVERY 5 MIN PRN
Status: DISCONTINUED | OUTPATIENT
Start: 2022-07-15 | End: 2022-07-15 | Stop reason: HOSPADM

## 2022-07-15 RX ORDER — ONDANSETRON 4 MG/1
4 TABLET, ORALLY DISINTEGRATING ORAL EVERY 6 HOURS PRN
Status: DISCONTINUED | OUTPATIENT
Start: 2022-07-15 | End: 2022-07-15 | Stop reason: HOSPADM

## 2022-07-15 RX ORDER — SIMETHICONE 40MG/0.6ML
133 SUSPENSION, DROPS(FINAL DOSAGE FORM)(ML) ORAL
Status: DISCONTINUED | OUTPATIENT
Start: 2022-07-15 | End: 2022-07-15 | Stop reason: HOSPADM

## 2022-07-15 RX ADMIN — FENTANYL CITRATE 100 MCG: 50 INJECTION INTRAMUSCULAR; INTRAVENOUS at 10:22

## 2022-07-15 RX ADMIN — MIDAZOLAM 2 MG: 1 INJECTION INTRAMUSCULAR; INTRAVENOUS at 10:22

## 2022-07-15 NOTE — H&P
Pre-Endoscopy History and Physical     Montez Valerio MRN# [id number]   YOB: 1970 Age: 52 year old     Date of Procedure: 7/15/2022  Primary care provider: Edna Younger  Type of Endoscopy: Colonoscopy with possible biopsy, possible polypectomy  Reason for Procedure: screen  Type of Anesthesia Anticipated: Conscious Sedation    HPI:    Montez is a 52 year old male who will be undergoing the above procedure.      A history and physical has been performed. The patient's medications and allergies have been reviewed. The risks and benefits of the procedure and the sedation options and risks were discussed with the patient.  All questions were answered and informed consent was obtained.      He denies a personal or family history of anesthesia complications or bleeding disorders.     Patient Active Problem List   Diagnosis     Esophageal reflux     iamdcLUMB/LUMBOSAC DISC DEGEN     Mixed hyperlipidemia     Type 2 diabetes mellitus without complication (H)     HYPERLIPIDEMIA LDL GOAL <100     Morbid obesity (H)     Essential hypertension, benign     SOB (shortness of breath)     Chest pressure        Past Medical History:   Diagnosis Date     Arthritis     Neck     Diabetes mellitus (H)     type 2     Dyspnea on exertion      Gastro-oesophageal reflux disease      Hypertension      Other chronic pain     neck pain     PONV (postoperative nausea and vomiting)      Sleep apnea     CPAP     SOB (shortness of breath) 4/26/2017        Past Surgical History:   Procedure Laterality Date     ARTHROSCOPY KNEE  2/12/2014    Procedure: ARTHROSCOPY KNEE;  Right Knee Arthroscopy, Removal of Loose Body, Partial Lateral Meniscectomy  ;  Surgeon: Amado Rondon MD;  Location: [facility] OR     AXILLARY SURGERY      Mass removed left     HC KNEE SCOPE, DIAGNOSTIC  1984    Arthroscopy, Knee right     HERNIORRHAPHY EPIGASTRIC N/A 7/18/2018    Procedure: HERNIORRHAPHY EPIGASTRIC;  open epigastric hernia repair with mesh;   Surgeon: Chastity Rios MD;  Location: [facility] OR     IR CERVICAL EPIDURAL STEROID INJECTION  12/19/2005     IR CERVICAL EPIDURAL STEROID INJECTION  1/6/2006     IR CERVICAL EPIDURAL STEROID INJECTION  2/20/2006     LAPAROSCOPIC CHOLECYSTECTOMY  12/21/2012    Procedure: LAPAROSCOPIC CHOLECYSTECTOMY;  LAPAROSCOPIC CHOLECYSTECTOMY;  Surgeon: Alton Riggs MD;  Location: [facility] OR     wisdom teeth[         Social History     Tobacco Use     Smoking status: Former Smoker     Types: Cigars, cigarillos or filtered cigars     Smokeless tobacco: Never Used     Tobacco comment: occ. cigar   Substance Use Topics     Alcohol use: Yes     Comment: 4-6 drinks, 1-2 nights week, beer/liquor       Family History   Problem Relation Age of Onset     Lipids Mother      Cerebrovascular Disease Mother      Lipids Father      Gallbladder Disease Father      Cerebrovascular Disease Father      C.A.D. Maternal Grandfather         MI     C.A.D. Paternal Grandfather         MI     Cancer Paternal Grandmother         Pancreatic     Alcohol/Drug Sister      Psychotic Disorder Sister      Mental Illness Sister      Gallbladder Disease Sister        Prior to Admission medications    Medication Sig Start Date End Date Taking? Authorizing Provider   cholecalciferol (VITAMIN  -D) 1000 UNITS capsule Take 1 capsule by mouth daily    Reported, Patient   glipiZIDE (GLUCOTROL XL) 10 MG 24 hr tablet Take 1 tablet (10 mg) by mouth daily 5/20/22   Edna Younger MD   hydrochlorothiazide (HYDRODIURIL) 25 MG tablet Take 1 tablet (25 mg) by mouth daily 5/20/22   Edna Younger MD   ibuprofen (ADVIL/MOTRIN) 600 MG tablet Take 1 tablet (600 mg) by mouth every 6 hours as needed for pain (mild) 7/18/18   Chastity Rios MD   losartan (COZAAR) 50 MG tablet Take 1.5 tablets (75 mg) by mouth daily 5/20/22   Edna Younger MD   metFORMIN (GLUCOPHAGE XR) 500 MG 24 hr tablet TAKE 2 TABLETS TWICE A DAY WITH MEALS 5/20/22   " Edna Younger MD   simvastatin (ZOCOR) 20 MG tablet Take 1 tablet (20 mg) by mouth daily at bedtime. 5/20/22   Edna Younger MD       Allergies   Allergen Reactions     No Known Drug Allergies         REVIEW OF SYSTEMS:   5 point ROS negative except as noted above in HPI, including Gen., Resp., CV, GI &  system review.    PHYSICAL EXAM:   There were no vitals taken for this visit. Estimated body mass index is 44.64 kg/m  as calculated from the following:    Height as of 5/20/22: 1.702 m (5' 7\").    Weight as of 5/20/22: 129.3 kg (285 lb).   GENERAL APPEARANCE: alert, and oriented  MENTAL STATUS: alert  AIRWAY EXAM: Mallampatti Class I (visualization of the soft palate, fauces, uvula, anterior and posterior pillars)  RESP: lungs clear to auscultation - no rales, rhonchi or wheezes  CV: regular rates and rhythm  DIAGNOSTICS:    Not indicated    IMPRESSION   ASA Class 2 - Mild systemic disease    PLAN:   Plan for Colonoscopy with possible biopsy, possible polypectomy. We discussed the risks, benefits and alternatives and the patient wished to proceed.    The above has been forwarded to the consulting provider.      Signed Electronically by: Epifanio Loaiza MD  July 15, 2022          "

## 2022-07-15 NOTE — LETTER
June 30, 2022      Montez Gaviriamichelle  [address] [place] [address]         .Please be aware that coverage of these services is subject to the terms and limitations of your health insurance plan.  Call member services at your health plan with any benefit or coverage questions.    Thank you for choosing [facility]. You are scheduled for the following service(s):    Date:  Friday July 15, 2022             Procedure:  COLONOSCOPY  Doctor:        Dr Loaiza   Arrival Time:  0945 Enter and check in at the Main Hospital Entrance  Procedure Time:  1030      Location:   [facility]        Endoscopy Department, First Floor         [address] [place], [place] [address]      [phone] or [phone] (Formerly [facility]) to reschedule      MIRALAX -GATORADE  PREP  Colonoscopy is the most accurate test to detect colon polyps and colon cancer; and the only test where polyps can be removed. During this procedure, a doctor examines the lining of your large intestine and rectum through a flexible tube.   Transportation  You must arrange for a ride for the day of your procedure with a responsible adult. A taxi , Uber, etc, is not an option unless you are accompanied by a responsible adult. If you fail to arrange transportation with a responsible adult, your procedure will be cancelled and rescheduled.    Purchase the  following supplies at your local pharmacy:  - 2 (two) bisacodyl tablets: each tablet contains 5 mg.  (Dulcolax  laxative NOT Dulcolax  stool softener)   - 1 (one) 8.3 oz bottle of Polyethylene Glycol (PEG) 3350 Powder   (MiraLAX , Smooth LAX , ClearLAX  or equivalent)  - 64 oz Gatorade    Regular Gatorade, Gatorade G2 , Powerade , Powerade Zero  or Pedialyte  is acceptable. Red colored flavors are not allowed; all other colors (yellow, green, orange, purple and blue) are okay. It is also okay to buy two 2.12 oz packets of powdered Gatorade that can be mixed with  water to a total volume of 64 oz of liquid.  - 1 (one) 10 oz bottle of Magnesium Citrate (Red colored flavors are not allowed)  It is also okay for you to use a 0.5 oz package of powdered magnesium citrate (17 g) mixed with 10 oz of water.      PREPARATION FOR COLONOSCOPY    7 days before:    Discontinue fiber supplements and medications containing iron. This includes Metamucil  and Fibercon ; and multivitamins with iron.    3 days before:    Begin a low-fiber diet. A low-fiber diet helps making the cleanout more effective.     Examples of a low-fiber diet include (but are not limited to): white bread, white rice, pasta, crackers, fish, chicken, eggs, ground beef, creamy peanut butter, cooked/steamed/boiled vegetables, canned fruit, bananas, melons, milk, plain yogurt cheese, salad dressing and other condiments.     The following are not allowed on a low-fiber diet: seeds, nuts, popcorn, bran, whole wheat, corn, quinoa, raw fruits and vegetables, berries and dried fruit, beans and lentils.    For additional details on low-fiber diet, please refer to the table on the last page.    2 days before:    Continue the low-fiber diet.     Drink at least 8 glasses of water throughout the day.     Stop eating solid foods at 11:45 pm.    1 day before:    In the morning: begin a clear liquid diet (liquids you can see through).     Examples of a clear liquid diet include: water, clear broth or bouillon, Gatorade, Pedialyte or Powerade, carbonated and non-carbonated soft drinks (Sprite , 7-Up , ginger ale), strained fruit juices without pulp (apple, white grape, white cranberry), Jell-O  and popsicles.     The following are not allowed on a clear liquid diet: red liquids, alcoholic beverages, dairy products (milk, creamer, and yogurt), protein shakes, creamy broths, juice with pulp and chewing tobacco.    At noon: take 2 (two) bisacodyl tablets     At 4 (and no later than 6pm): start drinking the Miralax-Gatorade preparation (8.3  oz of Miralax mixed with 64 oz of Gatorade in a large pitcher). Drink 1(one) 8 oz glass every 15 minutes thereafter, until the mixture is gone.    COLON CLEANSING TIPS: drink adequate amounts of fluids before and after your colon cleansing to prevent dehydration. Stay near a toilet because you will have diarrhea. Even if you are sitting on the toilet, continue to drink the cleansing solution every 15 minutes. If you feel nauseous or vomit, rinse your mouth with water, take a 15 to 30-minute-break and then continue drinking the solution. You will be uncomfortable until the stool has flushed from your colon (in about 2 to 4 hours). You may feel chilled.    Day of your procedure  You may take your morning medications including blood pressure medications, methadone, anti-seizure medications with sips of water 3 hours prior to your procedure or earlier. Do not take insulin, blood thinners (unless specifically told by your primary care provider) or vitamins prior to your procedure. Continue the clear liquid diet.       4 hours prior: drink 10 oz of magnesium citrate. It may be easier to drink it with a straw.    STOP consuming all liquids after that.     Do not take anything by mouth during this time.     Allow extra time to travel to your procedure as you may need to stop and use a restroom along the way.    You are ready for the procedure, if you followed all instructions and your stool is no longer formed, but clear or yellow liquid. If you are unsure whether your colon is clean, please call our office at [phone] before you leave for your appointment.    Bring the following to your procedure:  - Insurance Card/Photo ID.   - List of current medications including over-the-counter medications and supplements.   - Your rescue inhaler if you currently use one to control asthma.    Canceling or rescheduling your appointment:   If you must cancel or reschedule your appointment, please call [phone] as soon as  possible.      COLONOSCOPY PRE-PROCEDURE CHECKLIST    If you have diabetes, ask your regular doctor for diet and medication restrictions.  If you take an anticoagulant or anti-platelet medication (such as Coumadin , Lovenox , Pradaxa , Xarelto , Eliquis , etc.), please call your primary doctor for advice on holding this medication.  If you take aspirin you may continue to do so.  If you are or may be pregnant, please discuss the risks and benefits of this procedure with your doctor.        What happens during a colonoscopy?    Plan to spend up to two hours, starting at registration time, at the endoscopy center the day of your procedure. The colonoscopy takes an average of 15 to 30 minutes. Recovery time is about 30 minutes.      Before the exam:    You will change into a gown.    Your medical history and medication list will be reviewed with you, unless that has been done over the phone prior to the procedure.     A nurse will insert an intravenous (IV) line into your hand or arm.    The doctor will meet with you and will give you a consent form to sign.  During the exam:     Medicine will be given through the IV line to help you relax.     Your heart rate and oxygen levels will be monitored. If your blood pressure is low, you may be given fluids through the IV line.     The doctor will insert a flexible hollow tube, called a colonoscope, into your rectum. The scope will be advanced slowly through the large intestine (colon).    You may have a feeling of fullness or pressure.     If an abnormal tissue or a polyp is found, the doctor may remove it through the endoscope for closer examination, or biopsy. Tissue removal is painless    After the exam:           Any tissue samples removed during the exam will be sent to a lab for evaluation. It may take 5-7 working days for you to be notified of the results.     A nurse will provide you with complete discharge instructions before you leave the endoscopy center. Be sure  to ask the nurse for specific instructions if you take blood thinners such as Aspirin, Coumadin or Plavix.     The doctor will prepare a full report for you and for the physician who referred you for the procedure.     Your doctor will talk with you about the initial results of your exam.      Medication given during the exam will prohibit you from driving for the rest of the day.     Following the exam, you may resume your normal diet. Your first meal should be light, no greasy foods. Avoid alcohol until the next day.     You may resume your regular activities the day after the procedure.         LOW-FIBER DIET    Foods RECOMMENDED Foods to AVOID   Breads, Cereal, Rice and Pasta:   White bread, rolls, biscuits, croissant and dylan toast.   Waffles, Persian toast and pancakes.   White rice, noodles, pasta, macaroni and peeled cooked potatoes.   Plain crackers and saltines.   Cooked cereals: farina, cream of rice.   Cold cereals: Puffed Rice , Rice Krispies , Corn Flakes  and Special K    Breads, Cereal, Rice and Pasta:   Breads or rolls with nuts, seeds or fruit.   Whole wheat, pumpernickel, rye breads and cornbread.   Potatoes with skin, brown or wild rice, and kasha (buckwheat).     Vegetables:   Tender cooked and canned vegetables without seeds: carrots, asparagus tips, green or wax beans, pumpkin, spinach, lima beans. Vegetables:   Raw or steamed vegetables.   Vegetables with seeds.   Sauerkraut.   Winter squash, peas, broccoli, Brussel sprouts, cabbage, onions, cauliflower, baked beans, peas and corn.   Fruits:   Strained fruit juice.   Canned fruit, except pineapple.   Ripe bananas and melon. Fruits:   Prunes and prune juice.   Raw fruits.   Dried fruits: figs, dates and raisins.   Milk/Dairy:   Milk: plain or flavored.   Yogurt, custard and ice cream.   Cheese and cottage cheese Milk/Dairy:     Meat and other proteins:   ground, well-cooked tender beef, lamb, ham, veal, pork, fish, poultry and organ  meats.   Eggs.   Peanut butter without nuts. Meat and other proteins:   Tough, fibrous meats with gristle.   Dry beans, peas and lentils.   Peanut butter with nuts.   Tofu.   Fats, Snack, Sweets, Condiments and Beverages:   Margarine, butter, oils, mayonnaise, sour cream and salad dressing, plain gravy.   Sugar, hard candy, clear jelly, honey and syrup.   Spices, cooked herbs, bouillon, broth and soups made with allowed vegetable, ketchup and mustard.   Coffee, tea and carbonated drinks.   Plain cakes, cookies and pretzels.   Gelatin, plain puddings, custard, ice cream, sherbet and popsicles. Fats, Snack, Sweets, Condiments and Beverages:   Nuts, seeds and coconut.   Jam, marmalade and preserves.   Pickles, olives, relish and horseradish.   All desserts containing nuts, seeds, dried fruit and coconut; or made from whole grains or bran.   Candy made with nuts or seeds.   Popcorn.         DIRECTIONS TO THE ENDOSCOPY DEPARTMENT    From the north ([facility])  Take [address] South, exit on [address] [address]. Get into the left hand leo, turn left (east), go one-half mile to [address] and turn left. Go north to the second stoplight, take a right on [address] and follow it to the Main Hospital entrance.    From the south ([facility])  Take 35N to the 35E split and exit on [address] [address]. On [address] [address], turn left (west) to [address]. Turn right (north) on [address]. Go north to the second stoplight, take a right on [address] and follow it to the Main Hospital entrance.    From the east via 35E ([facility])  Take 35[address] south to [address] [address] exit. Turn right on [address] [address]. Go west to [address]. Turn right (north) on [address]. Go to the second stoplight, take a right on [address] to the Main Hospital entrance.    From the east via [address] ([facility])  Take [address] to [address]. Turn left (south) on [address] to [address], turn left (east) on [address] and follow it to the Main Hospital entrance.    From the west via [address] ([place])  Take [address] east to [address]. Turn right (south) on [address] to [address], turn left (east) on [address] and follow it to the Main Hospital entrance.

## 2022-07-31 DIAGNOSIS — E11.9 TYPE 2 DIABETES MELLITUS WITHOUT COMPLICATION, WITHOUT LONG-TERM CURRENT USE OF INSULIN (H): ICD-10-CM

## 2022-08-02 RX ORDER — GLIPIZIDE 10 MG/1
TABLET, FILM COATED, EXTENDED RELEASE ORAL
Qty: 90 TABLET | Refills: 0 | Status: SHIPPED | OUTPATIENT
Start: 2022-08-02 | End: 2022-10-12

## 2022-08-02 NOTE — TELEPHONE ENCOUNTER
Glipizide (Glucotrol) 10 mg  Prescription approved per [facility] Refill Protocol.  LOV 05/2022  Provider requested follow up appointment in Aug 2022. "[employer] message sent to patient.

## 2022-10-10 ENCOUNTER — HEALTH MAINTENANCE LETTER (OUTPATIENT)
Age: 52
End: 2022-10-10

## 2022-11-27 ENCOUNTER — HEALTH MAINTENANCE LETTER (OUTPATIENT)
Age: 52
End: 2022-11-27

## 2022-12-15 ENCOUNTER — TELEPHONE (OUTPATIENT)
Dept: INTERNAL MEDICINE | Facility: CLINIC | Age: 52
End: 2022-12-15

## 2022-12-15 DIAGNOSIS — E11.9 DIABETES MELLITUS (H): Primary | ICD-10-CM

## 2022-12-15 NOTE — TELEPHONE ENCOUNTER
Patient Quality Outreach    Patient is due for the following:   Diabetes -  Diabetic Follow-Up Visit    Next Steps:   Schedule a office visit for diabetes follow up.    Type of outreach:    Phone, left message for patient/parent to call back.    Next Steps:  Reach out within 90 days via Letter.    Max number of attempts reached: No. Will try again in 90 days if patient still on fail list.    Questions for provider review:    None     Lyly Butcher

## 2022-12-21 ENCOUNTER — LAB (OUTPATIENT)
Dept: LAB | Facility: CLINIC | Age: 52
End: 2022-12-21
Payer: COMMERCIAL

## 2022-12-21 DIAGNOSIS — Z12.5 SCREENING FOR PROSTATE CANCER: Primary | ICD-10-CM

## 2022-12-21 DIAGNOSIS — E11.9 TYPE 2 DIABETES MELLITUS WITHOUT COMPLICATION, WITHOUT LONG-TERM CURRENT USE OF INSULIN (H): ICD-10-CM

## 2022-12-21 DIAGNOSIS — E11.9 DIABETES MELLITUS (H): ICD-10-CM

## 2022-12-21 LAB
ALBUMIN SERPL BCG-MCNC: 4.4 G/DL (ref 3.5–5.2)
ALP SERPL-CCNC: 59 U/L (ref 40–129)
ALT SERPL W P-5'-P-CCNC: 25 U/L (ref 10–50)
ALT SERPL W P-5'-P-CCNC: 25 U/L (ref 10–50)
ANION GAP SERPL CALCULATED.3IONS-SCNC: 13 MMOL/L (ref 7–15)
ANION GAP SERPL CALCULATED.3IONS-SCNC: 13 MMOL/L (ref 7–15)
AST SERPL W P-5'-P-CCNC: 23 U/L (ref 10–50)
BILIRUB SERPL-MCNC: 0.4 MG/DL
BUN SERPL-MCNC: 7.2 MG/DL (ref 6–20)
BUN SERPL-MCNC: 7.2 MG/DL (ref 6–20)
CALCIUM SERPL-MCNC: 9.6 MG/DL (ref 8.6–10)
CALCIUM SERPL-MCNC: 9.6 MG/DL (ref 8.6–10)
CHLORIDE SERPL-SCNC: 100 MMOL/L (ref 98–107)
CHLORIDE SERPL-SCNC: 100 MMOL/L (ref 98–107)
CHOLEST SERPL-MCNC: 175 MG/DL
CREAT SERPL-MCNC: 0.83 MG/DL (ref 0.67–1.17)
CREAT SERPL-MCNC: 0.83 MG/DL (ref 0.67–1.17)
DEPRECATED HCO3 PLAS-SCNC: 26 MMOL/L (ref 22–29)
DEPRECATED HCO3 PLAS-SCNC: 26 MMOL/L (ref 22–29)
GFR SERPL CREATININE-BSD FRML MDRD: >90 ML/MIN/1.73M2
GFR SERPL CREATININE-BSD FRML MDRD: >90 ML/MIN/1.73M2
GLUCOSE SERPL-MCNC: 108 MG/DL (ref 70–99)
GLUCOSE SERPL-MCNC: 108 MG/DL (ref 70–99)
HBA1C MFR BLD: 6.8 % (ref 0–5.6)
HDLC SERPL-MCNC: 46 MG/DL
LDLC SERPL CALC-MCNC: 114 MG/DL
NONHDLC SERPL-MCNC: 129 MG/DL
POTASSIUM SERPL-SCNC: 3.9 MMOL/L (ref 3.4–5.3)
POTASSIUM SERPL-SCNC: 3.9 MMOL/L (ref 3.4–5.3)
PROT SERPL-MCNC: 7.1 G/DL (ref 6.4–8.3)
PSA SERPL-MCNC: 1.4 NG/ML (ref 0–3.5)
SODIUM SERPL-SCNC: 139 MMOL/L (ref 136–145)
SODIUM SERPL-SCNC: 139 MMOL/L (ref 136–145)
TRIGL SERPL-MCNC: 76 MG/DL

## 2022-12-21 PROCEDURE — 80053 COMPREHEN METABOLIC PANEL: CPT | Performed by: INTERNAL MEDICINE

## 2022-12-21 PROCEDURE — G0103 PSA SCREENING: HCPCS | Performed by: INTERNAL MEDICINE

## 2022-12-21 PROCEDURE — 84295 ASSAY OF SERUM SODIUM: CPT | Mod: 59 | Performed by: INTERNAL MEDICINE

## 2022-12-21 PROCEDURE — 83036 HEMOGLOBIN GLYCOSYLATED A1C: CPT

## 2022-12-21 PROCEDURE — 36415 COLL VENOUS BLD VENIPUNCTURE: CPT

## 2022-12-21 PROCEDURE — 80061 LIPID PANEL: CPT | Performed by: INTERNAL MEDICINE

## 2022-12-22 DIAGNOSIS — E11.9 TYPE 2 DIABETES MELLITUS WITHOUT COMPLICATION, WITHOUT LONG-TERM CURRENT USE OF INSULIN (H): ICD-10-CM

## 2022-12-22 DIAGNOSIS — E78.5 HYPERLIPIDEMIA LDL GOAL <100: ICD-10-CM

## 2022-12-22 DIAGNOSIS — I10 ESSENTIAL HYPERTENSION, BENIGN: ICD-10-CM

## 2022-12-27 ENCOUNTER — OFFICE VISIT (OUTPATIENT)
Dept: INTERNAL MEDICINE | Facility: CLINIC | Age: 52
End: 2022-12-27
Payer: COMMERCIAL

## 2022-12-27 VITALS
TEMPERATURE: 98.9 F | RESPIRATION RATE: 16 BRPM | HEART RATE: 103 BPM | OXYGEN SATURATION: 98 % | SYSTOLIC BLOOD PRESSURE: 138 MMHG | BODY MASS INDEX: 43 KG/M2 | WEIGHT: 274 LBS | DIASTOLIC BLOOD PRESSURE: 77 MMHG | HEIGHT: 67 IN

## 2022-12-27 DIAGNOSIS — I10 ESSENTIAL HYPERTENSION, BENIGN: ICD-10-CM

## 2022-12-27 DIAGNOSIS — R79.89 LOW TESTOSTERONE: ICD-10-CM

## 2022-12-27 DIAGNOSIS — E11.9 TYPE 2 DIABETES MELLITUS WITHOUT COMPLICATION, WITHOUT LONG-TERM CURRENT USE OF INSULIN (H): ICD-10-CM

## 2022-12-27 DIAGNOSIS — E66.01 MORBID OBESITY (H): ICD-10-CM

## 2022-12-27 DIAGNOSIS — Z23 HIGH PRIORITY FOR 2019-NCOV VACCINE: ICD-10-CM

## 2022-12-27 DIAGNOSIS — E78.5 HYPERLIPIDEMIA LDL GOAL <100: Primary | ICD-10-CM

## 2022-12-27 DIAGNOSIS — Z00.00 ROUTINE HISTORY AND PHYSICAL EXAMINATION OF ADULT: ICD-10-CM

## 2022-12-27 DIAGNOSIS — R79.89 LOW VITAMIN B12 LEVEL: ICD-10-CM

## 2022-12-27 DIAGNOSIS — R53.83 OTHER FATIGUE: ICD-10-CM

## 2022-12-27 LAB
ERYTHROCYTE [DISTWIDTH] IN BLOOD BY AUTOMATED COUNT: 12.3 % (ref 10–15)
HCT VFR BLD AUTO: 45.3 % (ref 40–53)
HGB BLD-MCNC: 16.1 G/DL (ref 13.3–17.7)
MCH RBC QN AUTO: 30.6 PG (ref 26.5–33)
MCHC RBC AUTO-ENTMCNC: 35.5 G/DL (ref 31.5–36.5)
MCV RBC AUTO: 86 FL (ref 78–100)
PLATELET # BLD AUTO: 307 10E3/UL (ref 150–450)
RBC # BLD AUTO: 5.26 10E6/UL (ref 4.4–5.9)
TSH SERPL DL<=0.005 MIU/L-ACNC: 2.05 UIU/ML (ref 0.3–4.2)
VIT B12 SERPL-MCNC: 255 PG/ML (ref 232–1245)
WBC # BLD AUTO: 9.3 10E3/UL (ref 4–11)

## 2022-12-27 PROCEDURE — 36415 COLL VENOUS BLD VENIPUNCTURE: CPT | Performed by: INTERNAL MEDICINE

## 2022-12-27 PROCEDURE — 84443 ASSAY THYROID STIM HORMONE: CPT | Performed by: INTERNAL MEDICINE

## 2022-12-27 PROCEDURE — 84403 ASSAY OF TOTAL TESTOSTERONE: CPT | Performed by: INTERNAL MEDICINE

## 2022-12-27 PROCEDURE — 99214 OFFICE O/P EST MOD 30 MIN: CPT | Mod: 25 | Performed by: INTERNAL MEDICINE

## 2022-12-27 PROCEDURE — 0124A COVID-19 VACCINE BIVALENT BOOSTER 12+ (PFIZER): CPT | Performed by: INTERNAL MEDICINE

## 2022-12-27 PROCEDURE — 99396 PREV VISIT EST AGE 40-64: CPT | Performed by: INTERNAL MEDICINE

## 2022-12-27 PROCEDURE — 85027 COMPLETE CBC AUTOMATED: CPT | Performed by: INTERNAL MEDICINE

## 2022-12-27 PROCEDURE — 82607 VITAMIN B-12: CPT | Performed by: INTERNAL MEDICINE

## 2022-12-27 PROCEDURE — 91312 COVID-19 VACCINE BIVALENT BOOSTER 12+ (PFIZER): CPT | Performed by: INTERNAL MEDICINE

## 2022-12-27 RX ORDER — LOSARTAN POTASSIUM 50 MG/1
TABLET ORAL
Qty: 135 TABLET | Refills: 1 | OUTPATIENT
Start: 2022-12-27

## 2022-12-27 RX ORDER — HYDROCHLOROTHIAZIDE 25 MG/1
TABLET ORAL
Qty: 90 TABLET | Refills: 0 | OUTPATIENT
Start: 2022-12-27

## 2022-12-27 RX ORDER — METFORMIN HCL 500 MG
TABLET, EXTENDED RELEASE 24 HR ORAL
Qty: 360 TABLET | Refills: 1 | Status: SHIPPED | OUTPATIENT
Start: 2022-12-27 | End: 2023-09-19

## 2022-12-27 RX ORDER — GLIPIZIDE 10 MG/1
TABLET, FILM COATED, EXTENDED RELEASE ORAL
Qty: 90 TABLET | Refills: 0 | OUTPATIENT
Start: 2022-12-27

## 2022-12-27 RX ORDER — SIMVASTATIN 20 MG
TABLET ORAL
Qty: 90 TABLET | Refills: 1 | OUTPATIENT
Start: 2022-12-27

## 2022-12-27 RX ORDER — HYDROCHLOROTHIAZIDE 25 MG/1
25 TABLET ORAL DAILY
Qty: 90 TABLET | Refills: 1 | Status: SHIPPED | OUTPATIENT
Start: 2022-12-27 | End: 2023-09-20

## 2022-12-27 RX ORDER — GLIPIZIDE 10 MG/1
10 TABLET, FILM COATED, EXTENDED RELEASE ORAL DAILY
Qty: 90 TABLET | Refills: 1 | Status: SHIPPED | OUTPATIENT
Start: 2022-12-27 | End: 2023-09-20

## 2022-12-27 RX ORDER — METFORMIN HCL 500 MG
TABLET, EXTENDED RELEASE 24 HR ORAL
Qty: 360 TABLET | Refills: 0 | OUTPATIENT
Start: 2022-12-27

## 2022-12-27 RX ORDER — LOSARTAN POTASSIUM 50 MG/1
50 TABLET ORAL DAILY
Qty: 90 TABLET | Refills: 1 | Status: SHIPPED | OUTPATIENT
Start: 2022-12-27 | End: 2023-09-19

## 2022-12-27 RX ORDER — ROSUVASTATIN CALCIUM 20 MG/1
20 TABLET, COATED ORAL DAILY
Qty: 90 TABLET | Refills: 1 | Status: SHIPPED | OUTPATIENT
Start: 2022-12-27 | End: 2023-09-19

## 2022-12-27 ASSESSMENT — ENCOUNTER SYMPTOMS
DYSURIA: 0
PALPITATIONS: 0
HEARTBURN: 1
HEMATURIA: 0
MYALGIAS: 1
PARESTHESIAS: 1
ABDOMINAL PAIN: 0
COUGH: 0
JOINT SWELLING: 0
CHILLS: 0
WEAKNESS: 1
NAUSEA: 0
FEVER: 0
FREQUENCY: 0
HEMATOCHEZIA: 0
SHORTNESS OF BREATH: 0
NERVOUS/ANXIOUS: 0
HEADACHES: 0
CONSTIPATION: 0
SORE THROAT: 0
FATIGUE: 1
DIARRHEA: 0
EYE PAIN: 0
ARTHRALGIAS: 1

## 2022-12-27 NOTE — PROGRESS NOTES
SUBJECTIVE:   CC: Montez is an 52 year old who presents for preventative health visit.     Patient has been advised of split billing requirements and indicates understanding: Yes  Healthy Habits:     Getting at least 3 servings of Calcium per day:  Yes    Bi-annual eye exam:  Yes    Dental care twice a year:  Yes    Sleep apnea or symptoms of sleep apnea:  Sleep apnea    Diet:  Regular (no restrictions)    Frequency of exercise:  2-3 days/week    Duration of exercise:  30-45 minutes    Taking medications regularly:  Yes    Medication side effects:  None    PHQ-2 Total Score: 2     Diabetes Follow-up    How often are you checking your blood sugar? One time daily- 100  What time of day are you checking your blood sugars (select all that apply)?  Before meals  Have you had any blood sugars above 200?  No  Have you had any blood sugars below 70?  No    What symptoms do you notice when your blood sugar is low?  None    What concerns do you have today about your diabetes? None     Do you have any of these symptoms? (Select all that apply)  No numbness or tingling in feet.  No redness, sores or blisters on feet.  No complaints of excessive thirst.  No reports of blurry vision.  No significant changes to weight.      BP Readings from Last 2 Encounters:   12/27/22 138/77   07/15/22 137/82     Hemoglobin A1C (%)   Date Value   12/21/2022 6.8 (H)   05/17/2022 9.1 (H)   03/23/2021 8.0 (H)   10/15/2020 7.4 (H)     LDL Cholesterol Calculated (mg/dL)   Date Value   12/21/2022 114 (H)   09/22/2021 91   10/15/2020 87   01/28/2020 117 (H)         Hyperlipidemia Follow-Up      Are you regularly taking any medication or supplement to lower your cholesterol?   Yes- simvastatin     Are you having muscle aches or other side effects that you think could be caused by your cholesterol lowering medication?  No    Hypertension Follow-up      Do you check your blood pressure regularly outside of the clinic? No     Are you following a low salt  diet? Yes    Are your blood pressures ever more than 140 on the top number (systolic) OR more   than 90 on the bottom number (diastolic), for example 140/90? No    Patient also complains of fatigue since several months, has history of sleep apnea and is on CPAP.  Would like to get testosterone levels checked.  No history of thyroid disease.      Today's PHQ-2 Score:   PHQ-2 ( 1999 [employer]) 12/27/2022   Q1: Little interest or pleasure in doing things 1   Q2: Feeling down, depressed or hopeless 1   PHQ-2 Score 2   PHQ-2 Total Score (12-17 Years)- Positive if 3 or more points; Administer PHQ-A if positive -   Q1: Little interest or pleasure in doing things Several days   Q2: Feeling down, depressed or hopeless Several days   PHQ-2 Score 2       Past Medical History:   Diagnosis Date     Arthritis     Neck     Diabetes mellitus (H)     type 2     Dyspnea on exertion      Gastro-oesophageal reflux disease      Hypertension      Other chronic pain     neck pain     PONV (postoperative nausea and vomiting)      Sleep apnea     CPAP     SOB (shortness of breath) 4/26/2017       Past Surgical History:   Procedure Laterality Date     ARTHROSCOPY KNEE  2/12/2014    Procedure: ARTHROSCOPY KNEE;  Right Knee Arthroscopy, Removal of Loose Body, Partial Lateral Meniscectomy  ;  Surgeon: Amado Rondon MD;  Location: [facility] OR     AXILLARY SURGERY      Mass removed left     COLONOSCOPY N/A 7/15/2022    Procedure: COLONOSCOPY (Fv);  Surgeon: Epifanio Loaiza MD;  Location:  GI     HC KNEE SCOPE, DIAGNOSTIC  1984    Arthroscopy, Knee right     HERNIORRHAPHY EPIGASTRIC N/A 7/18/2018    Procedure: HERNIORRHAPHY EPIGASTRIC;  open epigastric hernia repair with mesh;  Surgeon: Chastity Rios MD;  Location: [facility] OR     IR CERVICAL EPIDURAL STEROID INJECTION  12/19/2005     IR CERVICAL EPIDURAL STEROID INJECTION  1/6/2006     IR CERVICAL EPIDURAL STEROID INJECTION  2/20/2006     LAPAROSCOPIC CHOLECYSTECTOMY  12/21/2012    Procedure:  LAPAROSCOPIC CHOLECYSTECTOMY;  LAPAROSCOPIC CHOLECYSTECTOMY;  Surgeon: Alton Riggs MD;  Location: [facility] OR     wisdom teeth[         Current Outpatient Medications   Medication Sig Dispense Refill     cholecalciferol (VITAMIN  -D) 1000 UNITS capsule Take 1 capsule by mouth daily       glipiZIDE (GLUCOTROL XL) 10 MG 24 hr tablet Take 1 tablet (10 mg) by mouth daily 90 tablet 1     hydrochlorothiazide (HYDRODIURIL) 25 MG tablet Take 1 tablet (25 mg) by mouth daily 90 tablet 1     losartan (COZAAR) 50 MG tablet Take 1 tablet (50 mg) by mouth daily 90 tablet 1     metFORMIN (GLUCOPHAGE XR) 500 MG 24 hr tablet TAKE 2 TABLETS 2 TIMES  DAILY WITH MEALS 360 tablet 1     rosuvastatin (CRESTOR) 20 MG tablet Take 1 tablet (20 mg) by mouth daily 90 tablet 1     ibuprofen (ADVIL/MOTRIN) 600 MG tablet Take 1 tablet (600 mg) by mouth every 6 hours as needed for pain (mild) (Patient not taking: Reported on 12/27/2022) 30 tablet 0     Family History   Problem Relation Age of Onset     Lipids Mother      Cerebrovascular Disease Mother      Lipids Father      Gallbladder Disease Father      Cerebrovascular Disease Father         Recent years     Hypertension Father         Recent years     Hyperlipidemia Father         Recent years     C.A.D. Maternal Grandfather         MI     Cancer Paternal Grandmother         Pancreatic     C.A.D. Paternal Grandfather         MI     Alcohol/Drug Sister      Psychotic Disorder Sister      Mental Illness Sister      Gallbladder Disease Sister      Colon Cancer No family hx of          Social History     Tobacco Use     Smoking status: Former     Types: Cigars, cigarillos or filtered cigars     Smokeless tobacco: Never     Tobacco comments:     occ. cigar   Substance Use Topics     Alcohol use: Yes     Comment: 4-6 drinks, 1-2 nights week, beer/liquor     If you drink alcohol do you typically have >3 drinks per day or >7 drinks per week? No    Alcohol Use 12/27/2022   Prescreen: >3 drinks/day or  ">7 drinks/week? No   Prescreen: >3 drinks/day or >7 drinks/week? -   AUDIT SCORE  -         Last PSA:   PSA   Date Value Ref Range Status   03/23/2021 1.61 0 - 4 ug/L Final     Comment:     Assay Method:  Chemiluminescence using Siemens Vista analyzer     Prostate Specific Antigen Screen   Date Value Ref Range Status   12/21/2022 1.40 0.00 - 3.50 ng/mL Final       Reviewed orders with patient. Reviewed health maintenance and updated orders accordingly - Yes  Labs reviewed in EPIC    Reviewed and updated as needed this visit by clinical staff                  Reviewed and updated as needed this visit by Provider                     Review of Systems   Constitutional: Positive for fatigue. Negative for chills and fever.   HENT: Negative for congestion, ear pain, hearing loss and sore throat.    Eyes: Negative for pain and visual disturbance.   Respiratory: Negative for cough and shortness of breath.    Cardiovascular: Negative for chest pain and palpitations.   Gastrointestinal: Negative for abdominal pain, constipation, diarrhea, hematochezia and nausea.   Genitourinary: Negative for dysuria, frequency, genital sores, hematuria, impotence, penile discharge and urgency.   Musculoskeletal: Positive for arthralgias and myalgias. Negative for joint swelling.   Skin: Negative for rash.   Neurological: Negative for headaches.   Psychiatric/Behavioral: Negative for mood changes. The patient is not nervous/anxious.      OBJECTIVE:   /77   Pulse 103   Temp 98.9  F (37.2  C) (Tympanic)   Resp 16   Ht 1.702 m (5' 7\")   Wt 124.3 kg (274 lb)   SpO2 98%   BMI 42.91 kg/m      Physical Exam  GENERAL: healthy, alert and no distress  EYES: Eyes grossly normal to inspection, PERRL and conjunctivae and sclerae normal  RESP: lungs clear to auscultation - no rales, rhonchi or wheezes  CV: regular rate and rhythm, normal S1 S2,    ABDOMEN: soft, nontender,  and bowel sounds normal  MS: no  Edema, no calf tenderness " bilaterally  NEURO: Normal strength and tone, mentation intact and speech normal  PSYCH: mentation appears normal, affect normal/bright  Diabetic foot exam: normal DP and PT pulses, no trophic changes or ulcerative lesions, normal sensory exam and normal monofilament exam    Diagnostic Test Results:  Labs reviewed in Epic    ASSESSMENT/PLAN:        (Z00.00) Routine history and physical examination of adult  Plan: Reviewed all lab results with patient, up-to-date on colonoscopy, immunizations updated, discussed Shingrix vaccine      (E11.9) Type 2 diabetes mellitus without complication, without long-term current use of insulin (H)  Comment: A1c stable  Plan: Refilled glipiZIDE (GLUCOTROL XL) 10 MG 24 hr tablet, metFORMIN (GLUCOPHAGE XR) 500 MG 24 hr tablet as directed.explained clearly about the medication,insructions and side effects.           (E78.5) Hyperlipidemia LDL goal <100    Plan: Reviewed lipids with patient,   above goal, discontinued simvastatin and started on rosuvastatin (CRESTOR) 20 MG tablet 1 tablet daily as directed, repeat lipids in 3 months             (I10) Essential hypertension, benign  Comment: Blood pressure stable  Plan: hydrochlorothiazide (HYDRODIURIL) 25 MG tablet, losartan (COZAAR) 50 MG tablet refilled as directed.explained clearly about the medication,insructions and side effects.       (R53.83) Other fatigue  Plan: CBC with platelets, TSH with free T4 reflex,         Vitamin B12, Testosterone total            (Z23) High priority for 2019-nCoV vaccine  Plan: COVID-19,PF,PFIZER BOOSTER BIVALENT 12+Yrs                  (E66.01) Morbid obesity (H)  Plan: -Discussed in detail about Diet,calorie intake,and importance of regular exercise,     Patient has been advised of split billing requirements and indicates understanding: Yes      COUNSELING:   Reviewed preventive health counseling, as reflected in patient instructions       Regular exercise       Healthy diet/nutrition        "Immunizations    Vaccinated for: Covid-19 booster          BMI:   Estimated body mass index is 42.91 kg/m  as calculated from the following:    Height as of this encounter: 1.702 m (5' 7\").    Weight as of this encounter: 124.3 kg (274 lb).   Weight management plan: Discussed healthy diet and exercise guidelines      He reports that he has quit smoking. His smoking use included cigars, cigarillos or filtered cigars. He has never used smokeless tobacco.            Edna Younger MD  [facility]  "

## 2022-12-27 NOTE — TELEPHONE ENCOUNTER
Metformin 500 mg    Glipizide 10 mg    Hydrochlorothiazide 25 mg    Losartan 50 mg    Simvastatin 20 mg    All medications pass protocol.    Patient has appointment scheduled for today.    Appointments in Next Year      Dec 27, 2022 10:30 AM  (Arrive by 10:10 AM)  Adult Preventative Visit with Edna Younger MD  [facility] ([facility] ) [phone]

## 2022-12-27 NOTE — NURSING NOTE
"/77   Pulse 103   Temp 98.9  F (37.2  C) (Tympanic)   Resp 16   Ht 1.702 m (5' 7\")   Wt 124.3 kg (274 lb)   SpO2 98%   BMI 42.91 kg/m    Patient in for Male Px.  "

## 2022-12-29 LAB — TESTOST SERPL-MCNC: 298 NG/DL (ref 240–950)

## 2023-04-14 ASSESSMENT — ENCOUNTER SYMPTOMS
HALLUCINATIONS: 0
HEADACHES: 0
SPEECH CHANGE: 0
PARALYSIS: 0
FATIGUE: 1
POLYPHAGIA: 0
MEMORY LOSS: 0
DECREASED APPETITE: 0
MYALGIAS: 1
WEAKNESS: 1
NECK PAIN: 1
POLYDIPSIA: 0
NUMBNESS: 1
MUSCLE WEAKNESS: 1
BACK PAIN: 1
TREMORS: 0
STIFFNESS: 1
MUSCLE CRAMPS: 0
WEIGHT GAIN: 0
DISTURBANCES IN COORDINATION: 0
FEVER: 0
ALTERED TEMPERATURE REGULATION: 0
TINGLING: 1
WEIGHT LOSS: 0
ARTHRALGIAS: 1
DIZZINESS: 0
INCREASED ENERGY: 1
LOSS OF CONSCIOUSNESS: 0
JOINT SWELLING: 0
SEIZURES: 0
CHILLS: 0

## 2023-04-17 ENCOUNTER — VIRTUAL VISIT (OUTPATIENT)
Dept: ENDOCRINOLOGY | Facility: CLINIC | Age: 53
End: 2023-04-17
Attending: INTERNAL MEDICINE
Payer: COMMERCIAL

## 2023-04-17 DIAGNOSIS — R79.89 LOW TESTOSTERONE: Primary | ICD-10-CM

## 2023-04-17 DIAGNOSIS — E66.01 MORBID OBESITY (H): ICD-10-CM

## 2023-04-17 PROCEDURE — 99203 OFFICE O/P NEW LOW 30 MIN: CPT | Mod: VID | Performed by: INTERNAL MEDICINE

## 2023-04-17 NOTE — NURSING NOTE
Is the patient currently in the state of [place]? YES    Visit mode:VIDEO    If the visit is dropped, the patient can be reconnected by: VIDEO VISIT: Text to cell phone: [phone]    Will anyone else be joining the visit? NO      How would you like to obtain your AVS? MyChart    Are changes needed to the allergy or medication list? NO    Reason for visit: New Patient

## 2023-04-17 NOTE — PATIENT INSTRUCTIONS
-[facility]  Dr Aguilera, Endocrinology Department    [place] [facility]  [place] [address]  Appointment [phone]  Fax: [phone]  [place]: Monday - Thursday      Fasting morning labs needed.  Further work up based on that.  Follow up after that.

## 2023-04-17 NOTE — PROGRESS NOTES
"THIS IS A VIDEO VISIT:    Phone call visit/virtual visit encounter:    Name of patient: Montez Valerio    Date of encounter: 4/17/2023    Time of start of video visit: 9:59    Video started: 10:08    Video ended: 10:17    Provider location: working from home/ [facility]    Patient location: patients home.    Mode of transmission: [employer] video/ [employer]    Verbal consent: obtained before starting visit. Pt is agreeable.      The patient has been notified of following:      \"This VIDEO visit will be conducted via a call between you and your physician/provider. We have found that certain health care needs can be provided without the need for a physical exam.  This service lets us provide the care you need with a short phone conversation.  If a prescription is necessary we can send it directly to your pharmacy.  If lab work is needed we can place an order for that and you can then stop by our lab to have the test done at a later time.     With new updates with corona virus patient might be billed as clinic visit.     If during the course of the call the physician/provider feels a telephone visit is not appropriate, you will not be charged for this service.\"      Past medical history, social history, family history, allergy and medications were reviewed and updated as appropriate.  Reviewed pertinent labs, notes, imaging studies personally.      Name: Montez Valerio  Seen in consultation with Edna Younger for Low Testosterone/hypogonadism.  HPI:  Montez Valerio is a 53 year old male who presents for the evaluation of low testosterone.   has a past medical history of Arthritis, Diabetes mellitus (H), Dyspnea on exertion, Gastro-oesophageal reflux disease, Hypertension, Other chronic pain, PONV (postoperative nausea and vomiting), Sleep apnea, and SOB (shortness of breath) (4/26/2017).  No h/o CAD or DVT.    12/2022- low normal testosterone. At that time he was complaining of tiredness, low libido and " weak.  These s/s are ongoing X 1-2 years.    + tired.    Shave-No  Hair Growth/Changes-No  Muscle strength-decreased in last few years.  OTC Herbal-No  Children-No. Never tried.  Erectile dysfunction-Yes: X 5-6 months  Decreased libido- present X 5-6 months.  Radiation Exposure, Mumps Orchitis, Cryptorchidism:No  H/o Torsions or Pelvic Trauma: No  Previous use of testosterone: No  Weight: stable. Gradually loosing weight- trying to loose weight.  BMI > 40.0.  Patient feels well at this time and denies any tachycardia, palpitations, heat intolerance, tremor, insomnia, diarrhea, or unexplained weight loss.  Patient also denies  cold intolerance, constipation, or unexplained weight gain.   PMH/PSH:  Past Medical History:   Diagnosis Date     Arthritis     Neck     Diabetes mellitus (H)     type 2     Dyspnea on exertion      Gastro-oesophageal reflux disease      Hypertension      Other chronic pain     neck pain     PONV (postoperative nausea and vomiting)      Sleep apnea     CPAP     SOB (shortness of breath) 4/26/2017     Past Surgical History:   Procedure Laterality Date     ARTHROSCOPY KNEE  2/12/2014    Procedure: ARTHROSCOPY KNEE;  Right Knee Arthroscopy, Removal of Loose Body, Partial Lateral Meniscectomy  ;  Surgeon: Amado Rondon MD;  Location: [facility] OR     AXILLARY SURGERY      Mass removed left     COLONOSCOPY N/A 7/15/2022    Procedure: COLONOSCOPY (Fv);  Surgeon: Epifanio Loaiza MD;  Location:  GI     HC KNEE SCOPE, DIAGNOSTIC  1984    Arthroscopy, Knee right     HERNIORRHAPHY EPIGASTRIC N/A 7/18/2018    Procedure: HERNIORRHAPHY EPIGASTRIC;  open epigastric hernia repair with mesh;  Surgeon: Chastity Rios MD;  Location: [facility] OR     IR CERVICAL EPIDURAL STEROID INJECTION  12/19/2005     IR CERVICAL EPIDURAL STEROID INJECTION  1/6/2006     IR CERVICAL EPIDURAL STEROID INJECTION  2/20/2006     LAPAROSCOPIC CHOLECYSTECTOMY  12/21/2012    Procedure: LAPAROSCOPIC CHOLECYSTECTOMY;  LAPAROSCOPIC  CHOLECYSTECTOMY;  Surgeon: Alton Riggs MD;  Location: [facility] OR     wisdom teeth[       Family Hx:  Family History   Problem Relation Age of Onset     Lipids Mother      Cerebrovascular Disease Mother      Lipids Father      Gallbladder Disease Father      Cerebrovascular Disease Father         Recent years     Hypertension Father         Recent years     Hyperlipidemia Father         Recent years     C.A.D. Maternal Grandfather         MI     Cancer Paternal Grandmother         Pancreatic     C.A.D. Paternal Grandfather         MI     Alcohol/Drug Sister      Psychotic Disorder Sister      Mental Illness Sister      Gallbladder Disease Sister      Colon Cancer No family hx of        Social Hx:  Social History     Socioeconomic History     Marital status: Single     Spouse name: Not on file     Number of children: Not on file     Years of education: Not on file     Highest education level: Not on file   Occupational History     Not on file   Tobacco Use     Smoking status: Passive Smoke Exposure - Never Smoker     Smokeless tobacco: Never     Tobacco comments:     occasional cigar and cigarettes'   Vaping Use     Vaping status: Not on file   Substance and Sexual Activity     Alcohol use: Yes     Comment: 4-6 drinks, 1-2 nights week, beer/liquor     Drug use: No     Sexual activity: Not Currently   Other Topics Concern     Parent/sibling w/ CABG, MI or angioplasty before 65F 55M? No   Social History Narrative     Not on file     Social Determinants of Health     Financial Resource Strain: Not on file   Food Insecurity: Not on file   Transportation Needs: Not on file   Physical Activity: Not on file   Stress: Not on file   Social Connections: Not on file   Intimate Partner Violence: Not on file   Housing Stability: Not on file          MEDICATIONS:  has a current medication list which includes the following prescription(s): cholecalciferol, glipizide, hydrochlorothiazide, losartan, metformin, rosuvastatin, and  ibuprofen.    ROS     ROS: 10 point ROS neg other than the symptoms noted above in the HPI.    Physical Exam   VS: There were no vitals taken for this visit.  GENERAL: healthy, alert and no distress  EYES: Eyes grossly normal to inspection, conjunctivae and sclerae normal  ENT: no nose swelling, nasal discharge.  Thyroid: no apparent thyroid nodules  RESP: no audible wheeze, cough, or visible cyanosis.  No visible retractions or increased work of breathing.  Able to speak fully in complete sentences.  ABDO: not evaluated.  EXTREMITIES: no hand tremors.  NEURO: Cranial nerves grossly intact, mentation intact and speech normal  SKIN: No apparent skin lesions, rash or edema seen   PSYCH: mentation appears normal, affect normal/bright, judgement and insight intact, normal speech and appearance well-groomed    LABS:  TFTs:  Lab Results   Component Value Date    TSH 2.05 12/27/2022    TSH 2.40 01/28/2020       Testosterone:   Latest Ref Rng 12/27/2022  11:10 AM   [facility]     Testosterone Total 240 - 950 ng/dL 298        FSH/LH:    CBC:  Hemoglobin   Date Value Ref Range Status   12/27/2022 16.1 13.3 - 17.7 g/dL Final   03/23/2021 14.9 13.3 - 17.7 g/dL Final       PSA:   Latest Ref Rng 12/21/2022  9:40 AM   [facility]     PSA Tumor Marker 0.00 - 3.50 ng/mL 1.40            All pertinent notes, labs, and images personally reviewed by me.     A/P  Mr.Nick HENOK Valerio is a 53 year old here for the evaluation of hypogonadism.    1. Low Testosterone:  Pulsatile secretion of GnRH from the hypothalamus is required for both the initiation and maintenance of the reproductive axis.  GnRH stimulates the synthesis of LH and FSH.  FSH/LH  are responsible for testosterone production and spermatogenesis as well as systemic testosterone secretion and virilization.   Low normal testosteorne X 1.  Plan:  Discussed diagnosis, pathophysiology, management and treatment options of condition with pt.  Discussed that  fatigue can be multifactorial.  Plan to recheck labs and get baseline pituitary labs.  Consider further work up like brainMRI based on that.  Consider testosterone replacement based on albs.  Follow up after above.    Plan: Follicle stimulating hormone, Prolactin,         Testosterone Free and Total, TSH with free T4         reflex, LH ([facility])        Primary hypogonadism (Klinefelter's syndrome) is characterized by a low serum testosterone level/oligo- or azoospermia with elevated serum LH and FSH concentrations. Secondary hypogonadism is diagnosed in the setting of a low testosterone level and sperm count with  low or inappropriately normal serum LH and FSH concentrations.    Depending on laboratory studies will consider MRI of the pituitary and DEXA scan to assess bone mineral density.     More than 50% of the time spent with Mr. Valerio on counseling / coordinating his care.      All questions were answered.  The patient indicates understanding of the above issues and agrees with the plan set forth.      Follow-up:  As noted in AVS.    Rachael Aguilera MD  Endocrinology   [facility]    CC: Edna Younger  Answers for HPI/ROS submitted by the patient on 4/14/2023  General Symptoms: Yes  Skin Symptoms: No  HENT Symptoms: No  EYE SYMPTOMS: No  HEART SYMPTOMS: No  LUNG SYMPTOMS: No  INTESTINAL SYMPTOMS: No  URINARY SYMPTOMS: No  REPRODUCTIVE SYMPTOMS: Yes  SKELETAL SYMPTOMS: Yes  BLOOD SYMPTOMS: No  NERVOUS SYSTEM SYMPTOMS: Yes  MENTAL HEALTH SYMPTOMS: No  Fever: No  Loss of appetite: No  Weight loss: No  Weight gain: No  Fatigue: Yes  Chills: No  Increased stress: No  Excessive hunger: No  Excessive thirst: No  Feeling hot or cold when others believe the temperature is normal: No  Loss of height: No  Post-operative complications: No  Surgical site pain: No  Hallucinations: No  Change in or Loss of Energy: Yes  Hyperactivity: No  Confusion: No  Scrotal pain or swelling: No  Erectile  dysfunction: Yes  Penile discharge: No  Genital ulcers: No  Reduced libido: Yes  Back pain: Yes  Muscle aches: Yes  Neck pain: Yes  Swollen joints: No  Joint pain: Yes  Bone pain: No  Muscle cramps: No  Muscle weakness: Yes  Joint stiffness: Yes  Bone fracture: No  Trouble with coordination: No  Dizziness or trouble with balance: No  Fainting or black-out spells: No  Memory loss: No  Headache: No  Seizures: No  Speech problems: No  Tingling: Yes  Tremor: No  Weakness: Yes  Difficulty walking: No  Paralysis: No  Numbness: Yes

## 2023-04-17 NOTE — LETTER
"    4/17/2023         RE: Montez Valerio  [address] [place] [address]        Dear Colleague,    Thank you for referring your patient, Montez Valerio, to the [facility]. Please see a copy of my visit note below.    THIS IS A VIDEO VISIT:    Phone call visit/virtual visit encounter:    Name of patient: Montez Valerio    Date of encounter: 4/17/2023    Time of start of video visit: 9:59    Video started: 10:08    Video ended: 10:17    Provider location: working from home/ [facility]    Patient location: patients home.    Mode of transmission: [employer] video/ [employer]    Verbal consent: obtained before starting visit. Pt is agreeable.      The patient has been notified of following:      \"This VIDEO visit will be conducted via a call between you and your physician/provider. We have found that certain health care needs can be provided without the need for a physical exam.  This service lets us provide the care you need with a short phone conversation.  If a prescription is necessary we can send it directly to your pharmacy.  If lab work is needed we can place an order for that and you can then stop by our lab to have the test done at a later time.     With new updates with corona virus patient might be billed as clinic visit.     If during the course of the call the physician/provider feels a telephone visit is not appropriate, you will not be charged for this service.\"      Past medical history, social history, family history, allergy and medications were reviewed and updated as appropriate.  Reviewed pertinent labs, notes, imaging studies personally.      Name: Montez Valerio  Seen in consultation with Edna Younger for Low Testosterone/hypogonadism.  HPI:  Montez Valerio is a 53 year old male who presents for the evaluation of low testosterone.   has a past medical history of Arthritis, Diabetes mellitus (H), Dyspnea on exertion, Gastro-oesophageal reflux disease, " Hypertension, Other chronic pain, PONV (postoperative nausea and vomiting), Sleep apnea, and SOB (shortness of breath) (4/26/2017).  No h/o CAD or DVT.    12/2022- low normal testosterone. At that time he was complaining of tiredness, low libido and weak.  These s/s are ongoing X 1-2 years.    + tired.    Shave-No  Hair Growth/Changes-No  Muscle strength-decreased in last few years.  OTC Herbal-No  Children-No. Never tried.  Erectile dysfunction-Yes: X 5-6 months  Decreased libido- present X 5-6 months.  Radiation Exposure, Mumps Orchitis, Cryptorchidism:No  H/o Torsions or Pelvic Trauma: No  Previous use of testosterone: No  Weight: stable. Gradually loosing weight- trying to loose weight.  BMI > 40.0.  Patient feels well at this time and denies any tachycardia, palpitations, heat intolerance, tremor, insomnia, diarrhea, or unexplained weight loss.  Patient also denies  cold intolerance, constipation, or unexplained weight gain.   PMH/PSH:  Past Medical History:   Diagnosis Date     Arthritis     Neck     Diabetes mellitus (H)     type 2     Dyspnea on exertion      Gastro-oesophageal reflux disease      Hypertension      Other chronic pain     neck pain     PONV (postoperative nausea and vomiting)      Sleep apnea     CPAP     SOB (shortness of breath) 4/26/2017     Past Surgical History:   Procedure Laterality Date     ARTHROSCOPY KNEE  2/12/2014    Procedure: ARTHROSCOPY KNEE;  Right Knee Arthroscopy, Removal of Loose Body, Partial Lateral Meniscectomy  ;  Surgeon: Amado Rondon MD;  Location:  OR     AXILLARY SURGERY      Mass removed left     COLONOSCOPY N/A 7/15/2022    Procedure: COLONOSCOPY (Fv);  Surgeon: Epifanio Loaiza MD;  Location:  GI     HC KNEE SCOPE, DIAGNOSTIC  1984    Arthroscopy, Knee right     HERNIORRHAPHY EPIGASTRIC N/A 7/18/2018    Procedure: HERNIORRHAPHY EPIGASTRIC;  open epigastric hernia repair with mesh;  Surgeon: Chastity Rios MD;  Location:  OR     IR CERVICAL  EPIDURAL STEROID INJECTION  12/19/2005     IR CERVICAL EPIDURAL STEROID INJECTION  1/6/2006     IR CERVICAL EPIDURAL STEROID INJECTION  2/20/2006     LAPAROSCOPIC CHOLECYSTECTOMY  12/21/2012    Procedure: LAPAROSCOPIC CHOLECYSTECTOMY;  LAPAROSCOPIC CHOLECYSTECTOMY;  Surgeon: Alton Riggs MD;  Location: [facility] OR     wisdom teeth[       Family Hx:  Family History   Problem Relation Age of Onset     Lipids Mother      Cerebrovascular Disease Mother      Lipids Father      Gallbladder Disease Father      Cerebrovascular Disease Father         Recent years     Hypertension Father         Recent years     Hyperlipidemia Father         Recent years     C.A.D. Maternal Grandfather         MI     Cancer Paternal Grandmother         Pancreatic     C.A.D. Paternal Grandfather         MI     Alcohol/Drug Sister      Psychotic Disorder Sister      Mental Illness Sister      Gallbladder Disease Sister      Colon Cancer No family hx of        Social Hx:  Social History     Socioeconomic History     Marital status: Single     Spouse name: Not on file     Number of children: Not on file     Years of education: Not on file     Highest education level: Not on file   Occupational History     Not on file   Tobacco Use     Smoking status: Passive Smoke Exposure - Never Smoker     Smokeless tobacco: Never     Tobacco comments:     occasional cigar and cigarettes'   Vaping Use     Vaping status: Not on file   Substance and Sexual Activity     Alcohol use: Yes     Comment: 4-6 drinks, 1-2 nights week, beer/liquor     Drug use: No     Sexual activity: Not Currently   Other Topics Concern     Parent/sibling w/ CABG, MI or angioplasty before 65F 55M? No   Social History Narrative     Not on file     Social Determinants of Health     Financial Resource Strain: Not on file   Food Insecurity: Not on file   Transportation Needs: Not on file   Physical Activity: Not on file   Stress: Not on file   Social Connections: Not on file   Intimate  Partner Violence: Not on file   Housing Stability: Not on file          MEDICATIONS:  has a current medication list which includes the following prescription(s): cholecalciferol, glipizide, hydrochlorothiazide, losartan, metformin, rosuvastatin, and ibuprofen.    ROS     ROS: 10 point ROS neg other than the symptoms noted above in the HPI.    Physical Exam   VS: There were no vitals taken for this visit.  GENERAL: healthy, alert and no distress  EYES: Eyes grossly normal to inspection, conjunctivae and sclerae normal  ENT: no nose swelling, nasal discharge.  Thyroid: no apparent thyroid nodules  RESP: no audible wheeze, cough, or visible cyanosis.  No visible retractions or increased work of breathing.  Able to speak fully in complete sentences.  ABDO: not evaluated.  EXTREMITIES: no hand tremors.  NEURO: Cranial nerves grossly intact, mentation intact and speech normal  SKIN: No apparent skin lesions, rash or edema seen   PSYCH: mentation appears normal, affect normal/bright, judgement and insight intact, normal speech and appearance well-groomed    LABS:  TFTs:  Lab Results   Component Value Date    TSH 2.05 12/27/2022    TSH 2.40 01/28/2020       Testosterone:   Latest Ref Rng 12/27/2022  11:10 AM   [facility]     Testosterone Total 240 - 950 ng/dL 298        FSH/LH:    CBC:  Hemoglobin   Date Value Ref Range Status   12/27/2022 16.1 13.3 - 17.7 g/dL Final   03/23/2021 14.9 13.3 - 17.7 g/dL Final       PSA:   Latest Ref Rng 12/21/2022  9:40 AM   [facility]     PSA Tumor Marker 0.00 - 3.50 ng/mL 1.40            All pertinent notes, labs, and images personally reviewed by me.     A/P  Mr.Nick HENOK Valerio is a 53 year old here for the evaluation of hypogonadism.    1. Low Testosterone:  Pulsatile secretion of GnRH from the hypothalamus is required for both the initiation and maintenance of the reproductive axis.  GnRH stimulates the synthesis of LH and FSH.  FSH/LH  are responsible for  testosterone production and spermatogenesis as well as systemic testosterone secretion and virilization.   Low normal testosteorne X 1.  Plan:  Discussed diagnosis, pathophysiology, management and treatment options of condition with pt.  Discussed that fatigue can be multifactorial.  Plan to recheck labs and get baseline pituitary labs.  Consider further work up like brainMRI based on that.  Consider testosterone replacement based on albs.  Follow up after above.    Plan: Follicle stimulating hormone, Prolactin,         Testosterone Free and Total, TSH with free T4         reflex, LH ([facility])        Primary hypogonadism (Klinefelter's syndrome) is characterized by a low serum testosterone level/oligo- or azoospermia with elevated serum LH and FSH concentrations. Secondary hypogonadism is diagnosed in the setting of a low testosterone level and sperm count with  low or inappropriately normal serum LH and FSH concentrations.    Depending on laboratory studies will consider MRI of the pituitary and DEXA scan to assess bone mineral density.     More than 50% of the time spent with Mr. Valerio on counseling / coordinating his care.      All questions were answered.  The patient indicates understanding of the above issues and agrees with the plan set forth.      Follow-up:  As noted in AVS.    Rachael Aguilera MD  Endocrinology   [facility]    CC: Edna Younger  Answers for HPI/ROS submitted by the patient on 4/14/2023  General Symptoms: Yes  Skin Symptoms: No  HENT Symptoms: No  EYE SYMPTOMS: No  HEART SYMPTOMS: No  LUNG SYMPTOMS: No  INTESTINAL SYMPTOMS: No  URINARY SYMPTOMS: No  REPRODUCTIVE SYMPTOMS: Yes  SKELETAL SYMPTOMS: Yes  BLOOD SYMPTOMS: No  NERVOUS SYSTEM SYMPTOMS: Yes  MENTAL HEALTH SYMPTOMS: No  Fever: No  Loss of appetite: No  Weight loss: No  Weight gain: No  Fatigue: Yes  Chills: No  Increased stress: No  Excessive hunger: No  Excessive thirst: No  Feeling hot or cold  when others believe the temperature is normal: No  Loss of height: No  Post-operative complications: No  Surgical site pain: No  Hallucinations: No  Change in or Loss of Energy: Yes  Hyperactivity: No  Confusion: No  Scrotal pain or swelling: No  Erectile dysfunction: Yes  Penile discharge: No  Genital ulcers: No  Reduced libido: Yes  Back pain: Yes  Muscle aches: Yes  Neck pain: Yes  Swollen joints: No  Joint pain: Yes  Bone pain: No  Muscle cramps: No  Muscle weakness: Yes  Joint stiffness: Yes  Bone fracture: No  Trouble with coordination: No  Dizziness or trouble with balance: No  Fainting or black-out spells: No  Memory loss: No  Headache: No  Seizures: No  Speech problems: No  Tingling: Yes  Tremor: No  Weakness: Yes  Difficulty walking: No  Paralysis: No  Numbness: Yes          Again, thank you for allowing me to participate in the care of your patient.        Sincerely,        Rachael Aguilera MD

## 2023-08-20 ENCOUNTER — HEALTH MAINTENANCE LETTER (OUTPATIENT)
Age: 53
End: 2023-08-20

## 2023-09-19 DIAGNOSIS — E11.9 TYPE 2 DIABETES MELLITUS WITHOUT COMPLICATION, WITHOUT LONG-TERM CURRENT USE OF INSULIN (H): ICD-10-CM

## 2023-09-19 DIAGNOSIS — E78.5 HYPERLIPIDEMIA LDL GOAL <100: ICD-10-CM

## 2023-09-19 DIAGNOSIS — I10 ESSENTIAL HYPERTENSION, BENIGN: ICD-10-CM

## 2023-09-19 RX ORDER — METFORMIN HCL 500 MG
1000 TABLET, EXTENDED RELEASE 24 HR ORAL 2 TIMES DAILY WITH MEALS
Qty: 120 TABLET | Refills: 0 | Status: SHIPPED | OUTPATIENT
Start: 2023-09-19 | End: 2023-10-04

## 2023-09-19 RX ORDER — LOSARTAN POTASSIUM 50 MG/1
50 TABLET ORAL DAILY
Qty: 30 TABLET | Refills: 0 | Status: SHIPPED | OUTPATIENT
Start: 2023-09-19 | End: 2023-10-04

## 2023-09-19 RX ORDER — ROSUVASTATIN CALCIUM 20 MG/1
20 TABLET, COATED ORAL DAILY
Qty: 30 TABLET | Refills: 0 | Status: SHIPPED | OUTPATIENT
Start: 2023-09-19 | End: 2023-10-04

## 2023-09-20 NOTE — TELEPHONE ENCOUNTER
Pt is over due for appt, overdue for labs, pt needs appt , please schedule pt with me on my same day or approval spots. I have refilled for 1 month only

## 2023-10-04 ENCOUNTER — OFFICE VISIT (OUTPATIENT)
Dept: INTERNAL MEDICINE | Facility: CLINIC | Age: 53
End: 2023-10-04
Payer: COMMERCIAL

## 2023-10-04 VITALS
WEIGHT: 276.4 LBS | BODY MASS INDEX: 43.38 KG/M2 | DIASTOLIC BLOOD PRESSURE: 82 MMHG | RESPIRATION RATE: 16 BRPM | HEIGHT: 67 IN | TEMPERATURE: 97.9 F | HEART RATE: 90 BPM | OXYGEN SATURATION: 97 % | SYSTOLIC BLOOD PRESSURE: 132 MMHG

## 2023-10-04 DIAGNOSIS — E78.5 HYPERLIPIDEMIA LDL GOAL <100: ICD-10-CM

## 2023-10-04 DIAGNOSIS — R79.89 LOW TESTOSTERONE: ICD-10-CM

## 2023-10-04 DIAGNOSIS — I10 ESSENTIAL HYPERTENSION, BENIGN: Primary | ICD-10-CM

## 2023-10-04 DIAGNOSIS — R79.89 LOW VITAMIN B12 LEVEL: ICD-10-CM

## 2023-10-04 DIAGNOSIS — E11.9 TYPE 2 DIABETES MELLITUS WITHOUT COMPLICATION, WITHOUT LONG-TERM CURRENT USE OF INSULIN (H): ICD-10-CM

## 2023-10-04 LAB — HBA1C MFR BLD: 7.8 % (ref 0–5.6)

## 2023-10-04 PROCEDURE — 82570 ASSAY OF URINE CREATININE: CPT | Performed by: INTERNAL MEDICINE

## 2023-10-04 PROCEDURE — 99214 OFFICE O/P EST MOD 30 MIN: CPT | Performed by: INTERNAL MEDICINE

## 2023-10-04 PROCEDURE — 36415 COLL VENOUS BLD VENIPUNCTURE: CPT | Performed by: INTERNAL MEDICINE

## 2023-10-04 PROCEDURE — 80053 COMPREHEN METABOLIC PANEL: CPT | Performed by: INTERNAL MEDICINE

## 2023-10-04 PROCEDURE — 80061 LIPID PANEL: CPT | Performed by: INTERNAL MEDICINE

## 2023-10-04 PROCEDURE — 82043 UR ALBUMIN QUANTITATIVE: CPT | Performed by: INTERNAL MEDICINE

## 2023-10-04 PROCEDURE — 83002 ASSAY OF GONADOTROPIN (LH): CPT | Performed by: INTERNAL MEDICINE

## 2023-10-04 PROCEDURE — 84270 ASSAY OF SEX HORMONE GLOBUL: CPT | Performed by: INTERNAL MEDICINE

## 2023-10-04 PROCEDURE — 82607 VITAMIN B-12: CPT | Performed by: INTERNAL MEDICINE

## 2023-10-04 PROCEDURE — 84146 ASSAY OF PROLACTIN: CPT | Performed by: INTERNAL MEDICINE

## 2023-10-04 PROCEDURE — 83036 HEMOGLOBIN GLYCOSYLATED A1C: CPT | Performed by: INTERNAL MEDICINE

## 2023-10-04 PROCEDURE — 83001 ASSAY OF GONADOTROPIN (FSH): CPT | Performed by: INTERNAL MEDICINE

## 2023-10-04 PROCEDURE — 84403 ASSAY OF TOTAL TESTOSTERONE: CPT | Performed by: INTERNAL MEDICINE

## 2023-10-04 RX ORDER — LOSARTAN POTASSIUM 50 MG/1
50 TABLET ORAL DAILY
Qty: 90 TABLET | Refills: 1 | Status: SHIPPED | OUTPATIENT
Start: 2023-10-04 | End: 2024-03-14

## 2023-10-04 RX ORDER — ROSUVASTATIN CALCIUM 20 MG/1
20 TABLET, COATED ORAL DAILY
Qty: 90 TABLET | Refills: 1 | Status: SHIPPED | OUTPATIENT
Start: 2023-10-04 | End: 2024-03-06

## 2023-10-04 RX ORDER — HYDROCHLOROTHIAZIDE 25 MG/1
25 TABLET ORAL DAILY
Qty: 90 TABLET | Refills: 1 | Status: SHIPPED | OUTPATIENT
Start: 2023-10-04 | End: 2024-03-06

## 2023-10-04 RX ORDER — METFORMIN HCL 500 MG
1000 TABLET, EXTENDED RELEASE 24 HR ORAL 2 TIMES DAILY WITH MEALS
Qty: 180 TABLET | Refills: 1 | Status: SHIPPED | OUTPATIENT
Start: 2023-10-04 | End: 2023-12-29

## 2023-10-04 RX ORDER — GLIPIZIDE 10 MG/1
10 TABLET, FILM COATED, EXTENDED RELEASE ORAL DAILY
Qty: 90 TABLET | Refills: 1 | Status: SHIPPED | OUTPATIENT
Start: 2023-10-04 | End: 2024-03-14

## 2023-10-04 NOTE — PROGRESS NOTES
"  Assessment & Plan     (I10) Essential hypertension, benign  (primary encounter diagnosis)  Comment: Blood pressure stable  Plan: losartan (COZAAR) 50 MG tablet, hydrochlorothiazide (HYDRODIURIL) 25 MG tablet refilled as directed.explained clearly about the medication,insructions and side effects.            (E11.9) Type 2 diabetes mellitus without complication, without long-term current use of insulin (H)  Plan: Check A1c, CMP, refilled glipiZIDE (GLUCOTROL XL) 10 MG 24 hr tablet,         metFORMIN (GLUCOPHAGE XR) 500 MG 24 hr tablet as directed.explained clearly about the medication,insructions and side effects.            (E78.5) HYPERLIPIDEMIA LDL GOAL <100  Plan: Check lipid panel, patient was advised to start taking rosuvastatin (CRESTOR) 20 MG tablet as directed.explained clearly about the medication,insructions and side effects.             (R79.89) Low vitamin B12 level  Plan: Repeat B12 level today .pt was told I will contact after results and proceed accordingly.      Review of the result(s) of each unique test - A1c CMP  Prescription drug management    BMI:   Estimated body mass index is 43.29 kg/m  as calculated from the following:    Height as of this encounter: 1.702 m (5' 7\").    Weight as of this encounter: 125.4 kg (276 lb 6.4 oz).   Weight management plan: Discussed healthy diet and exercise guidelines        Edna Younger MD  [facility] JENNIFER Herrmann is a 53 year old, presenting for the following health issues:  Diabetes, Lipids, and Hypertension      10/4/2023    11:19 AM   Additional Questions   Roomed by di   Accompanied by self         10/4/2023    11:19 AM   Patient Reported Additional Medications   Patient reports taking the following new medications none       HPI       Diabetes Follow-up    How often are you checking your blood sugar? One time daily BS- 120-150   What time of day are you checking your blood sugars (select all that apply)?  " Before meals  Have you had any blood sugars above 200?  No  Have you had any blood sugars below 70?  No  What symptoms do you notice when your blood sugar is low?  None  What concerns do you have today about your diabetes? None   Do you have any of these symptoms? (Select all that apply)  Numbness in feet and Burning in feet  Have you had a diabetic eye exam in the last 12 months? No      Hyperlipidemia Follow-Up    Are you regularly taking any medication or supplement to lower your cholesterol?   Yes- rosuvastatin , patient states that he thinks he is still using simvastatin.  Are you having muscle aches or other side effects that you think could be caused by your cholesterol lowering medication?  No    Hypertension Follow-up    Do you check your blood pressure regularly outside of the clinic? Yes   Are you following a low salt diet? No  Are your blood pressures ever more than 140 on the top number (systolic) OR more   than 90 on the bottom number (diastolic), for example 140/90? Yes    BP Readings from Last 2 Encounters:   10/04/23 132/82   12/27/22 138/77     Hemoglobin A1C (%)   Date Value   12/21/2022 6.8 (H)   05/17/2022 9.1 (H)   03/23/2021 8.0 (H)   10/15/2020 7.4 (H)     LDL Cholesterol Calculated (mg/dL)   Date Value   12/21/2022 114 (H)   09/22/2021 91   10/15/2020 87   01/28/2020 117 (H)     Follows up with endocrinologist for low testosterone    How many servings of fruits and vegetables do you eat daily?  2-3  On average, how many sweetened beverages do you drink each day (Examples: soda, juice, sweet tea, etc.  Do NOT count diet or artificially sweetened beverages)?   2  How many days per week do you exercise enough to make your heart beat faster? 3 or less  How many minutes a day do you exercise enough to make your heart beat faster? 9 or less  How many days per week do you miss taking your medication? 0      Past Medical History:   Diagnosis Date    Arthritis     Neck    Diabetes mellitus (H)      "type 2    Dyspnea on exertion     Gastro-oesophageal reflux disease     Hypertension     Other chronic pain     neck pain    PONV (postoperative nausea and vomiting)     Sleep apnea     CPAP    SOB (shortness of breath) 4/26/2017       Current Outpatient Medications   Medication Sig Dispense Refill    cholecalciferol (VITAMIN  -D) 1000 UNITS capsule Take 1 capsule by mouth daily      glipiZIDE (GLUCOTROL XL) 10 MG 24 hr tablet Take 1 tablet (10 mg) by mouth daily 30 tablet 0    hydrochlorothiazide (HYDRODIURIL) 25 MG tablet Take 1 tablet (25 mg) by mouth daily 30 tablet 0    losartan (COZAAR) 50 MG tablet Take 1 tablet (50 mg) by mouth daily Needs appointment for further refills 30 tablet 0    metFORMIN (GLUCOPHAGE XR) 500 MG 24 hr tablet Take 2 tablets (1,000 mg) by mouth 2 times daily (with meals) Needs appointment for further refills 120 tablet 0    rosuvastatin (CRESTOR) 20 MG tablet Take 1 tablet (20 mg) by mouth daily Needs appointment for further refills 30 tablet 0    ibuprofen (ADVIL/MOTRIN) 600 MG tablet Take 1 tablet (600 mg) by mouth every 6 hours as needed for pain (mild) (Patient not taking: Reported on 12/27/2022) 30 tablet 0           Review of Systems   CONSTITUTIONAL: NEGATIVE for fever, chills, change in weight  RESP: NEGATIVE for significant cough or SOB  CV: NEGATIVE for chest pain, palpitations or peripheral edema  MUSCULOSKELETAL: NEGATIVE for significant arthralgias or myalgia  NEURO: NEGATIVE for weakness, dizziness or paresthesias  ENDOCRINE: NEGATIVE for temperature intolerance, skin/hair changes      Objective    /82   Pulse 90   Temp 97.9  F (36.6  C) (Tympanic)   Resp 16   Ht 1.702 m (5' 7\")   Wt 125.4 kg (276 lb 6.4 oz)   SpO2 97%   BMI 43.29 kg/m    Body mass index is 43.29 kg/m .  Physical Exam   GENERAL:   alert and no distress  EYES: Eyes grossly normal to inspection, PERRL and conjunctivae and sclerae normal  RESP: lungs clear to auscultation - no rales, rhonchi or " wheezes  CV: regular rate and rhythm, normal S1 S2,    MS: no gross musculoskeletal defects noted, no edema  NEURO: Normal strength and tone, mentation intact and speech normal  PSYCH: mentation appears normal, affect normal/bright  Diabetic foot exam: normal DP and PT pulses, no trophic changes or ulcerative lesions, normal sensory exam, and normal monofilament exam

## 2023-10-05 LAB
ALBUMIN SERPL BCG-MCNC: 4.7 G/DL (ref 3.5–5.2)
ALP SERPL-CCNC: 60 U/L (ref 40–129)
ALT SERPL W P-5'-P-CCNC: 30 U/L (ref 0–70)
ANION GAP SERPL CALCULATED.3IONS-SCNC: 13 MMOL/L (ref 7–15)
AST SERPL W P-5'-P-CCNC: 24 U/L (ref 0–45)
BILIRUB SERPL-MCNC: 0.6 MG/DL
BUN SERPL-MCNC: 10.3 MG/DL (ref 6–20)
CALCIUM SERPL-MCNC: 9.9 MG/DL (ref 8.6–10)
CHLORIDE SERPL-SCNC: 100 MMOL/L (ref 98–107)
CHOLEST SERPL-MCNC: 120 MG/DL
CREAT SERPL-MCNC: 0.78 MG/DL (ref 0.67–1.17)
CREAT UR-MCNC: 23.7 MG/DL
DEPRECATED HCO3 PLAS-SCNC: 24 MMOL/L (ref 22–29)
EGFRCR SERPLBLD CKD-EPI 2021: >90 ML/MIN/1.73M2
FSH SERPL IRP2-ACNC: 3.9 MIU/ML (ref 1.5–12.4)
GLUCOSE SERPL-MCNC: 96 MG/DL (ref 70–99)
HDLC SERPL-MCNC: 39 MG/DL
LDLC SERPL CALC-MCNC: 69 MG/DL
LH SERPL-ACNC: 2.5 MIU/ML (ref 1.7–8.6)
MICROALBUMIN UR-MCNC: <12 MG/L
MICROALBUMIN/CREAT UR: NORMAL MG/G{CREAT}
NONHDLC SERPL-MCNC: 81 MG/DL
POTASSIUM SERPL-SCNC: 3.8 MMOL/L (ref 3.4–5.3)
PROLACTIN SERPL 3RD IS-MCNC: 6 NG/ML (ref 4–15)
PROT SERPL-MCNC: 7.5 G/DL (ref 6.4–8.3)
SHBG SERPL-SCNC: 22 NMOL/L (ref 11–80)
SODIUM SERPL-SCNC: 137 MMOL/L (ref 135–145)
TRIGL SERPL-MCNC: 62 MG/DL
VIT B12 SERPL-MCNC: 524 PG/ML (ref 232–1245)

## 2023-10-06 LAB
TESTOST FREE SERPL-MCNC: 4.75 NG/DL
TESTOST SERPL-MCNC: 201 NG/DL (ref 240–950)

## 2023-10-06 NOTE — RESULT ENCOUNTER NOTE
Montez    Recently done endocrinology lab test/ imaging test showed:  Noted slightly low testosterone levels. Recommend follow up appointment to discuss in details.    Here is a copy for your records.  Follow up as discussed in last clinic visit.    Please call endocrinology clinic ( [phone]) if questions.    Rachael Aguilera MD  Endocrinology   [facility]  October 6, 2023

## 2023-11-27 ENCOUNTER — PATIENT OUTREACH (OUTPATIENT)
Dept: CARE COORDINATION | Facility: CLINIC | Age: 53
End: 2023-11-27
Payer: COMMERCIAL

## 2023-11-27 NOTE — TELEPHONE ENCOUNTER
Medication is being filled for 1 time refill only due to:  patient is overdue for an appointment     Next 5 appointments (look out 90 days)    Jan 28, 2020  9:40 AM CST  [facility] [address]  [facility] [address]  [phone]             Patient went to the ER rt foot pain. Patient left food hurt but the right foot is the worst. Patient said it is hurting, burning, tingling and inflamed. Patient can barely walk on it or put pressure on it. Patient was given Naproxen to take 2 times a day when needed. Xray and u/s was done. Patient call back # [phone]

## 2023-12-11 ENCOUNTER — PATIENT OUTREACH (OUTPATIENT)
Dept: CARE COORDINATION | Facility: CLINIC | Age: 53
End: 2023-12-11
Payer: COMMERCIAL

## 2023-12-29 DIAGNOSIS — E11.9 TYPE 2 DIABETES MELLITUS WITHOUT COMPLICATION, WITHOUT LONG-TERM CURRENT USE OF INSULIN (H): ICD-10-CM

## 2023-12-29 RX ORDER — METFORMIN HCL 500 MG
TABLET, EXTENDED RELEASE 24 HR ORAL
Qty: 180 TABLET | Refills: 1 | Status: SHIPPED | OUTPATIENT
Start: 2023-12-29 | End: 2024-03-14

## 2024-03-06 DIAGNOSIS — I10 ESSENTIAL HYPERTENSION, BENIGN: ICD-10-CM

## 2024-03-06 DIAGNOSIS — E78.5 HYPERLIPIDEMIA LDL GOAL <100: ICD-10-CM

## 2024-03-06 RX ORDER — HYDROCHLOROTHIAZIDE 25 MG/1
25 TABLET ORAL DAILY
Qty: 90 TABLET | Refills: 1 | Status: SHIPPED | OUTPATIENT
Start: 2024-03-06 | End: 2024-09-30

## 2024-03-06 RX ORDER — ROSUVASTATIN CALCIUM 20 MG/1
20 TABLET, COATED ORAL DAILY
Qty: 90 TABLET | Refills: 1 | Status: SHIPPED | OUTPATIENT
Start: 2024-03-06 | End: 2024-09-30 | Stop reason: DRUGHIGH

## 2024-03-14 DIAGNOSIS — I10 ESSENTIAL HYPERTENSION, BENIGN: ICD-10-CM

## 2024-03-14 DIAGNOSIS — E11.9 TYPE 2 DIABETES MELLITUS WITHOUT COMPLICATION, WITHOUT LONG-TERM CURRENT USE OF INSULIN (H): ICD-10-CM

## 2024-03-14 RX ORDER — LOSARTAN POTASSIUM 50 MG/1
50 TABLET ORAL DAILY
Qty: 90 TABLET | Refills: 1 | Status: SHIPPED | OUTPATIENT
Start: 2024-03-14 | End: 2024-09-30

## 2024-03-14 RX ORDER — METFORMIN HCL 500 MG
TABLET, EXTENDED RELEASE 24 HR ORAL
Qty: 180 TABLET | Refills: 0 | Status: SHIPPED | OUTPATIENT
Start: 2024-03-14 | End: 2024-05-20

## 2024-03-14 RX ORDER — GLIPIZIDE 10 MG/1
10 TABLET, FILM COATED, EXTENDED RELEASE ORAL DAILY
Qty: 90 TABLET | Refills: 0 | Status: SHIPPED | OUTPATIENT
Start: 2024-03-14 | End: 2024-07-08

## 2024-03-17 ENCOUNTER — HEALTH MAINTENANCE LETTER (OUTPATIENT)
Age: 54
End: 2024-03-17

## 2024-05-19 DIAGNOSIS — E11.9 TYPE 2 DIABETES MELLITUS WITHOUT COMPLICATION, WITHOUT LONG-TERM CURRENT USE OF INSULIN (H): ICD-10-CM

## 2024-05-20 RX ORDER — METFORMIN HCL 500 MG
TABLET, EXTENDED RELEASE 24 HR ORAL
Qty: 180 TABLET | Refills: 0 | Status: SHIPPED | OUTPATIENT
Start: 2024-05-20 | End: 2024-07-08

## 2024-05-26 ENCOUNTER — HEALTH MAINTENANCE LETTER (OUTPATIENT)
Age: 54
End: 2024-05-26

## 2024-07-03 DIAGNOSIS — E11.9 TYPE 2 DIABETES MELLITUS WITHOUT COMPLICATION, WITHOUT LONG-TERM CURRENT USE OF INSULIN (H): ICD-10-CM

## 2024-07-08 RX ORDER — GLIPIZIDE 10 MG/1
10 TABLET, FILM COATED, EXTENDED RELEASE ORAL DAILY
Qty: 24 TABLET | Refills: 0 | Status: SHIPPED | OUTPATIENT
Start: 2024-07-08 | End: 2024-09-30

## 2024-07-08 RX ORDER — METFORMIN HCL 500 MG
TABLET, EXTENDED RELEASE 24 HR ORAL
Qty: 56 TABLET | Refills: 0 | Status: SHIPPED | OUTPATIENT
Start: 2024-07-08 | End: 2024-09-30

## 2024-07-08 NOTE — TELEPHONE ENCOUNTER
Last seen since more than 6 months, last labs more than 6 months .refilled until his appt on 07/22/24

## 2024-09-30 ENCOUNTER — OFFICE VISIT (OUTPATIENT)
Dept: INTERNAL MEDICINE | Facility: CLINIC | Age: 54
End: 2024-09-30
Payer: COMMERCIAL

## 2024-09-30 VITALS
BODY MASS INDEX: 41.18 KG/M2 | HEIGHT: 67 IN | OXYGEN SATURATION: 99 % | RESPIRATION RATE: 14 BRPM | DIASTOLIC BLOOD PRESSURE: 82 MMHG | HEART RATE: 82 BPM | WEIGHT: 262.4 LBS | SYSTOLIC BLOOD PRESSURE: 124 MMHG | TEMPERATURE: 99 F

## 2024-09-30 DIAGNOSIS — E78.2 MIXED HYPERLIPIDEMIA: ICD-10-CM

## 2024-09-30 DIAGNOSIS — Z12.5 SCREENING FOR PROSTATE CANCER: ICD-10-CM

## 2024-09-30 DIAGNOSIS — M24.549 CONTRACTURE OF HAND: ICD-10-CM

## 2024-09-30 DIAGNOSIS — I10 ESSENTIAL HYPERTENSION, BENIGN: Primary | ICD-10-CM

## 2024-09-30 DIAGNOSIS — E11.9 TYPE 2 DIABETES MELLITUS WITHOUT COMPLICATION, WITHOUT LONG-TERM CURRENT USE OF INSULIN (H): ICD-10-CM

## 2024-09-30 DIAGNOSIS — Z00.00 ROUTINE HISTORY AND PHYSICAL EXAMINATION OF ADULT: ICD-10-CM

## 2024-09-30 LAB
ALBUMIN SERPL BCG-MCNC: 4.6 G/DL (ref 3.5–5.2)
ALP SERPL-CCNC: 65 U/L (ref 40–150)
ALT SERPL W P-5'-P-CCNC: 19 U/L (ref 0–70)
ANION GAP SERPL CALCULATED.3IONS-SCNC: 11 MMOL/L (ref 7–15)
AST SERPL W P-5'-P-CCNC: 21 U/L (ref 0–45)
BILIRUB SERPL-MCNC: 0.6 MG/DL
BUN SERPL-MCNC: 7.5 MG/DL (ref 6–20)
CALCIUM SERPL-MCNC: 9.9 MG/DL (ref 8.8–10.4)
CHLORIDE SERPL-SCNC: 100 MMOL/L (ref 98–107)
CHOLEST SERPL-MCNC: 186 MG/DL
CREAT SERPL-MCNC: 0.85 MG/DL (ref 0.67–1.17)
CREAT UR-MCNC: 79.5 MG/DL
EGFRCR SERPLBLD CKD-EPI 2021: >90 ML/MIN/1.73M2
EST. AVERAGE GLUCOSE BLD GHB EST-MCNC: 157 MG/DL
FASTING STATUS PATIENT QL REPORTED: YES
FASTING STATUS PATIENT QL REPORTED: YES
GLUCOSE SERPL-MCNC: 118 MG/DL (ref 70–99)
HBA1C MFR BLD: 7.1 % (ref 0–5.6)
HCO3 SERPL-SCNC: 24 MMOL/L (ref 22–29)
HDLC SERPL-MCNC: 56 MG/DL
HGB BLD-MCNC: 15.6 G/DL (ref 13.3–17.7)
LDLC SERPL CALC-MCNC: 116 MG/DL
MICROALBUMIN UR-MCNC: <12 MG/L
MICROALBUMIN/CREAT UR: NORMAL MG/G{CREAT}
NONHDLC SERPL-MCNC: 130 MG/DL
POTASSIUM SERPL-SCNC: 3.9 MMOL/L (ref 3.4–5.3)
PROT SERPL-MCNC: 7.5 G/DL (ref 6.4–8.3)
PSA SERPL DL<=0.01 NG/ML-MCNC: 2.18 NG/ML (ref 0–3.5)
SODIUM SERPL-SCNC: 135 MMOL/L (ref 135–145)
TRIGL SERPL-MCNC: 71 MG/DL

## 2024-09-30 PROCEDURE — 80053 COMPREHEN METABOLIC PANEL: CPT | Performed by: INTERNAL MEDICINE

## 2024-09-30 PROCEDURE — 36415 COLL VENOUS BLD VENIPUNCTURE: CPT | Performed by: INTERNAL MEDICINE

## 2024-09-30 PROCEDURE — 99396 PREV VISIT EST AGE 40-64: CPT | Performed by: INTERNAL MEDICINE

## 2024-09-30 PROCEDURE — 82043 UR ALBUMIN QUANTITATIVE: CPT | Performed by: INTERNAL MEDICINE

## 2024-09-30 PROCEDURE — 99214 OFFICE O/P EST MOD 30 MIN: CPT | Mod: 25 | Performed by: INTERNAL MEDICINE

## 2024-09-30 PROCEDURE — 83036 HEMOGLOBIN GLYCOSYLATED A1C: CPT | Performed by: INTERNAL MEDICINE

## 2024-09-30 PROCEDURE — 80061 LIPID PANEL: CPT | Performed by: INTERNAL MEDICINE

## 2024-09-30 PROCEDURE — 82570 ASSAY OF URINE CREATININE: CPT | Performed by: INTERNAL MEDICINE

## 2024-09-30 PROCEDURE — G0103 PSA SCREENING: HCPCS | Performed by: INTERNAL MEDICINE

## 2024-09-30 PROCEDURE — 85018 HEMOGLOBIN: CPT | Performed by: INTERNAL MEDICINE

## 2024-09-30 RX ORDER — ROSUVASTATIN CALCIUM 10 MG/1
10 TABLET, COATED ORAL DAILY
Qty: 90 TABLET | Refills: 1 | Status: SHIPPED | OUTPATIENT
Start: 2024-09-30

## 2024-09-30 RX ORDER — LOSARTAN POTASSIUM 50 MG/1
50 TABLET ORAL DAILY
Qty: 90 TABLET | Refills: 1 | Status: SHIPPED | OUTPATIENT
Start: 2024-09-30

## 2024-09-30 RX ORDER — METFORMIN HCL 500 MG
1000 TABLET, EXTENDED RELEASE 24 HR ORAL 2 TIMES DAILY WITH MEALS
Qty: 360 TABLET | Refills: 1 | Status: SHIPPED | OUTPATIENT
Start: 2024-09-30

## 2024-09-30 RX ORDER — HYDROCHLOROTHIAZIDE 25 MG/1
25 TABLET ORAL DAILY
Qty: 90 TABLET | Refills: 1 | Status: SHIPPED | OUTPATIENT
Start: 2024-09-30

## 2024-09-30 SDOH — HEALTH STABILITY: PHYSICAL HEALTH: ON AVERAGE, HOW MANY DAYS PER WEEK DO YOU ENGAGE IN MODERATE TO STRENUOUS EXERCISE (LIKE A BRISK WALK)?: 3 DAYS

## 2024-09-30 SDOH — HEALTH STABILITY: PHYSICAL HEALTH: ON AVERAGE, HOW MANY MINUTES DO YOU ENGAGE IN EXERCISE AT THIS LEVEL?: 30 MIN

## 2024-09-30 ASSESSMENT — SOCIAL DETERMINANTS OF HEALTH (SDOH): HOW OFTEN DO YOU GET TOGETHER WITH FRIENDS OR RELATIVES?: ONCE A WEEK

## 2024-09-30 NOTE — PROGRESS NOTES
"Preventive Care Visit  [facility]  Edna Younger MD, Internal Medicine  Sep 30, 2024      Assessment & Plan       (Z00.00) Routine history and physical examination of adult  Plan: Comprehensive metabolic panel, Hemoglobin            (I10) Essential hypertension, benign    Plan: BP stable , refilled hydrochlorothiazide (HYDRODIURIL) 25 MG tablet,        losartan (COZAAR) 50 MG tablet as directed.explained clearly about the medication,insructions and side effects. Advised to follow low salt diet and exercise and f/u in 6 mths.           (E11.9) Type 2 diabetes mellitus without complication, without long-term current use of insulin (H)  Plan: OPTOMETRY REFERRAL, HEMOGLOBIN A1C, Albumin         Random Urine Quantitative with Creat Ratio,  pt has stopped glipizide,  A1c 7.1 today  , ok to discontinue glipizide and refilled  metFORMIN (GLUCOPHAGE XR) 500 MG 24 hr tablet as directed.explained clearly about the medication,insructions and side effects.            (E78.2) Mixed hyperlipidemia  Plan: Lipid panel reflex to direct LDL Non-fasting, reviewed previous lipids - well controlled - reduced rosuvastatin (CRESTOR) to 10 MG tablet as directed.explained clearly about the medication,insructions and side effects.             (M24.549) Contracture of hand  Plan: Orthopedic  Referral            (Z12.5) Screening for prostate cancer  Plan: Prostate Specific Antigen Screen             Patient has been advised of split billing requirements and indicates understanding: Yes        BMI  Estimated body mass index is 41.1 kg/m  as calculated from the following:    Height as of this encounter: 1.702 m (5' 7\").    Weight as of this encounter: 119 kg (262 lb 6.4 oz).   Weight management plan: Discussed healthy diet and exercise guidelines    Counseling  Appropriate preventive services were addressed with this patient via screening, questionnaire, or discussion as appropriate . The patient " reports drinking more than 3 alcoholic drinks per day and/or more than 7 drhnks per week. The patient was counseled and given information about possible harmful effects of excessive alcohol intake.        Nasima Herrmann is a 54 year old, presenting for the following:  Physical        9/30/2024     2:16 PM   Additional Questions   Roomed by di   Accompanied by self         9/30/2024     2:16 PM   Patient Reported Additional Medications   Patient reports taking the following new medications none        Health Care Directive  Patient does not have a Health Care Directive or Living Will: Discussed advance care planning with patient; information given to patient to review.    HPI     Diabetes Follow-up    How often are you checking your blood sugar? One time daily,  out of glipizide since 2-3 weeks , only on metformin   What time of day are you checking your blood sugars (select all that apply)?  Before meals  Have you had any blood sugars above 200?  Occ   Have you had any blood sugars below 70?  No  What symptoms do you notice when your blood sugar is low?  None  What concerns do you have today about your diabetes? None   Do you have any of these symptoms? (Select all that apply)  No numbness or tingling in feet.  No redness, sores or blisters on feet.  No complaints of excessive thirst.  No reports of blurry vision.  No significant changes to weight.  Have you had a diabetic eye exam in the last 12 months? No      BP Readings from Last 2 Encounters:   09/30/24 124/82   10/04/23 132/82     Hemoglobin A1C (%)   Date Value   10/04/2023 7.8 (H)   12/21/2022 6.8 (H)   03/23/2021 8.0 (H)   10/15/2020 7.4 (H)     LDL Cholesterol Calculated (mg/dL)   Date Value   10/04/2023 69   12/21/2022 114 (H)   10/15/2020 87   01/28/2020 117 (H)        Hyperlipidemia Follow-Up    Are you regularly taking any medication or supplement to lower your cholesterol?   Yes- crestor   Are you having muscle aches or other side effects that  you think could be caused by your cholesterol lowering medication?  No    Hypertension Follow-up    Do you check your blood pressure regularly outside of the clinic? Yes   Are you following a low salt diet? Yes  Are your blood pressures ever more than 140 on the top number (systolic) OR more   than 90 on the bottom number (diastolic), for example 140/90? No      Pt also c/o tendon contracture of rt hand, no pain           9/30/2024   General Health   How would you rate your overall physical health? (!) FAIR   Feel stress (tense, anxious, or unable to sleep) Only a little      (!) STRESS CONCERN      9/30/2024   Nutrition   Three or more servings of calcium each day? (!) I DON'T KNOW   Diet: Regular (no restrictions)   How many servings of fruit and vegetables per day? (!) 2-3   How many sweetened beverages each day? 0-1            9/30/2024   Exercise   Days per week of moderate/strenous exercise 3 days   Average minutes spent exercising at this level 30 min            9/30/2024   Social Factors   Frequency of gathering with friends or relatives Once a week   Worry food won't last until get money to buy more No   Food not last or not have enough money for food? No   Do you have housing? (Housing is defined as stable permanent housing and does not include staying ouside in a car, in a tent, in an abandoned building, in an overnight shelter, or couch-surfing.) Yes   Are you worried about losing your housing? No   Lack of transportation? No   Unable to get utilities (heat,electricity)? No            9/30/2024   Fall Risk   Fallen 2 or more times in the past year? No    No   Trouble with walking or balance? No    No       Multiple values from one day are sorted in reverse-chronological order          9/30/2024   Dental   Dentist two times every year? Yes            9/30/2024   TB Screening   Were you born outside of the [place]? No            Today's PHQ-2 Score:       9/30/2024    11:06 AM   PHQ-2 ( 1999 [employer])   Q1:  Little interest or pleasure in doing things 1   Q2: Feeling down, depressed or hopeless 1   PHQ-2 Score 2   Q1: Little interest or pleasure in doing things Several days   Q2: Feeling down, depressed or hopeless Several days   PHQ-2 Score 2           9/30/2024   Substance Use   Alcohol more than 3/day or more than 7/wk Yes   How often do you have a drink containing alcohol 2 to 3 times a week   How many alcohol drinks on typical day 5 or 6   How often do you have 5+ drinks at one occasion Weekly   Audit 2/3 Score 5   How often not able to stop drinking once started Never   How often failed to do what normally expected Never   How often needed first drink in am after a heavy drinking session Never   How often feeling of guilt or remorse after drinking Less than monthly   How often unable to remember what happened the night before Never   Have you or someone else been injured because of your drinking No   Has anyone been concerned or suggested you cut down on drinking No   TOTAL SCORE - AUDIT 9   Do you use any other substances recreationally? No        Social History     Tobacco Use    Smoking status: Passive Smoke Exposure - Never Smoker     Passive exposure: Yes    Smokeless tobacco: Never    Tobacco comments:     occasional cigar and cigarettes'   Substance Use Topics    Alcohol use: Yes     Comment: 4-6 drinks, 1-2 nights week, beer/liquor    Drug use: No             9/30/2024   One time HIV Screening   Previous HIV test? Yes          9/30/2024   STI Screening   New sexual partner(s) since last STI/HIV test? No      Last PSA:   PSA   Date Value Ref Range Status   03/23/2021 1.61 0 - 4 ug/L Final     Comment:     Assay Method:  Chemiluminescence using Siemens Vista analyzer     Prostate Specific Antigen Screen   Date Value Ref Range Status   12/21/2022 1.40 0.00 - 3.50 ng/mL Final          Reviewed and updated as needed this visit by Provider                    Past Medical History:   Diagnosis Date    Arthritis      Neck    Diabetes mellitus (H)     type 2    Dyspnea on exertion     Gastro-oesophageal reflux disease     Hypertension     Other chronic pain     neck pain    PONV (postoperative nausea and vomiting)     Sleep apnea     CPAP    SOB (shortness of breath) 4/26/2017     Past Surgical History:   Procedure Laterality Date    ARTHROSCOPY KNEE  2/12/2014    Procedure: ARTHROSCOPY KNEE;  Right Knee Arthroscopy, Removal of Loose Body, Partial Lateral Meniscectomy  ;  Surgeon: Amado Rondon MD;  Location: [facility] OR    AXILLARY SURGERY      Mass removed left    COLONOSCOPY N/A 7/15/2022    Procedure: COLONOSCOPY (Fv);  Surgeon: Epifanio Loaiza MD;  Location: [facility] GI    HC KNEE SCOPE, DIAGNOSTIC  1984    Arthroscopy, Knee right    HERNIORRHAPHY EPIGASTRIC N/A 7/18/2018    Procedure: HERNIORRHAPHY EPIGASTRIC;  open epigastric hernia repair with mesh;  Surgeon: Chastity Rios MD;  Location: [facility] OR    IR CERVICAL EPIDURAL STEROID INJECTION  12/19/2005    IR CERVICAL EPIDURAL STEROID INJECTION  1/6/2006    IR CERVICAL EPIDURAL STEROID INJECTION  2/20/2006    LAPAROSCOPIC CHOLECYSTECTOMY  12/21/2012    Procedure: LAPAROSCOPIC CHOLECYSTECTOMY;  LAPAROSCOPIC CHOLECYSTECTOMY;  Surgeon: Alton Riggs MD;  Location: [facility] OR    wisdom teeth[       Current Outpatient Medications   Medication Sig Dispense Refill    cholecalciferol (VITAMIN  -D) 1000 UNITS capsule Take 1 capsule by mouth daily      hydrochlorothiazide (HYDRODIURIL) 25 MG tablet Take 1 tablet (25 mg) by mouth daily. 90 tablet 1    losartan (COZAAR) 50 MG tablet Take 1 tablet (50 mg) by mouth daily. 90 tablet 1    metFORMIN (GLUCOPHAGE XR) 500 MG 24 hr tablet Take 2 tablets (1,000 mg) by mouth 2 times daily (with meals). 360 tablet 1    rosuvastatin (CRESTOR) 10 MG tablet Take 1 tablet (10 mg) by mouth daily. 90 tablet 1         Review of Systems  CONSTITUTIONAL: NEGATIVE for fever, chills,    EYES: NEGATIVE for vision changes or irritation  ENT/MOUTH: NEGATIVE  "for ear, mouth and throat problems  RESP: NEGATIVE for significant cough or SOB  CV: NEGATIVE for chest pain, palpitations or peripheral edema  GI: NEGATIVE for nausea, abdominal pain, heartburn, or change in bowel habits  : NEGATIVE for frequency, dysuria, or hematuria  MUSCULOSKELETAL: NEGATIVE for significant arthralgias or myalgia  NEURO: NEGATIVE for weakness, dizziness or paresthesias  ENDOCRINE: NEGATIVE for temperature intolerance, skin/hair changes  HEME: NEGATIVE for bleeding problems  PSYCHIATRIC: NEGATIVE for changes in mood or affect     Objective    Exam  /82   Pulse 82   Temp 99  F (37.2  C) (Tympanic)   Resp 14   Ht 1.702 m (5' 7\")   Wt 119 kg (262 lb 6.4 oz)   SpO2 99%   BMI 41.10 kg/m     Estimated body mass index is 41.1 kg/m  as calculated from the following:    Height as of this encounter: 1.702 m (5' 7\").    Weight as of this encounter: 119 kg (262 lb 6.4 oz).    Physical Exam  GENERAL: alert and no distress  EYES: Eyes grossly normal to inspection, PERRL and conjunctivae and sclerae normal  HENT: ear canals and TM's normal, nose and mouth without ulcers or lesions  RESP: lungs clear to auscultation - no rales, rhonchi or wheezes  CV: regular rate and rhythm, normal S1 S2,    ABDOMEN: soft, nontender,  and bowel sounds normal  MS: no gross musculoskeletal defects noted, no edema  NEURO: Normal strength and tone, mentation intact and speech normal  PSYCH: mentation appears normal, affect normal/bright  Diabetic foot exam: normal DP and PT pulses, no trophic changes or ulcerative lesions, normal sensory exam, and normal monofilament exam        Signed Electronically by: Edna Younger MD    "

## 2024-09-30 NOTE — NURSING NOTE
"/82   Pulse 82   Temp 99  F (37.2  C) (Tympanic)   Resp 14   Ht 1.702 m (5' 7\")   Wt 119 kg (262 lb 6.4 oz)   SpO2 99%   BMI 41.10 kg/m      "

## 2024-10-01 ENCOUNTER — PATIENT OUTREACH (OUTPATIENT)
Dept: CARE COORDINATION | Facility: CLINIC | Age: 54
End: 2024-10-01
Payer: COMMERCIAL

## 2024-10-03 ENCOUNTER — PATIENT OUTREACH (OUTPATIENT)
Dept: CARE COORDINATION | Facility: CLINIC | Age: 54
End: 2024-10-03
Payer: COMMERCIAL

## 2024-11-23 ENCOUNTER — MYC REFILL (OUTPATIENT)
Dept: INTERNAL MEDICINE | Facility: CLINIC | Age: 54
End: 2024-11-23
Payer: COMMERCIAL

## 2024-11-26 NOTE — TELEPHONE ENCOUNTER
Attempt # 1  Called Phone # [phone]      Was Call answered? No     Non-detailed voicemail left on November 26, 2024 9:12 AM to call clinic at: [phone].     On Call Back:     Please relay Dr. Younger's message - Vitamin D can be purchased over the counter.      Thank you,  Ed Grace, Triage RN [facility]  9:12 AM 11/26/2024

## 2024-11-26 NOTE — TELEPHONE ENCOUNTER
Pt called back. Relayed provider's message. Pt verbalized understanding.    Thank you for visiting the [facility], [place].    Please understand this is a provisional diagnosis that can and may change. The diagnosis that you are discharged with is based on the symptoms you described and the diagnostic information available today. If any new symptoms occur or worsen, you should seek immediate re-evaluation at the nearest emergency department. If any symptoms persist, please follow up for reassessment with your primary care provider.     It is difficult to recognize all elements of any illness or injury in a single visit. The examination, treatment, and x-rays received are on a preliminary basis only. A radiologist will also review your x-rays for final reading. Call your primary care provider if you have questions or problems before your next appointment. If you are unable to  reach your Primary Care Provider (PCP), please call or return to the Walk-In Clinic.  If symptoms worsen or do not resolve please follow-up with your Primary Care Provider (PCP), walk-in or the nearest  emergency room for emergency symptoms.  If you are unsure of whom to follow up with, call [phone] and ask to speak with either your PCP, the walk-In nurse or the on-call provider if you are calling after hours.      If you are referred to a specialist or scheduled for a test, our referrals department will call you with your appointment date and time within 3 business days. If you have not heard from them in this time frame, please call [phone] and ask for the referrals department.     Test results: Unless otherwise instructed, you should be notified of test results within one week. Please call our office if you do not hear from us within this time frame at [phone].     Clinic Hours:  Monday through Friday: 7:00 am to 7:00 pm  Saturday: 7:00 am to 3:00 pm  Sunday: 7:00 am to 1:00 pm  Holiday hours may vary, please call [phone] on Holidays.  Walk-in is open 365 days a year!!    Help us  to grow our quality of service! We want to improve - and you can help!  You may receive a survey in the mail.  This is your opportunity to tell us what excellent service you received, and where we could use improvement.  We value your input!     Interested in decreasing your wait time in the Walk-in/Urgent Care Clinic?  Same day reservations can now be made on line.  Go to [email or web address] to see the wait times at each [facility] and to make a reservation at the site that is most convenient for you. We will do our best to honor your reservation time but please understand that wait times are subject to change once you arrive at the clinic.    Thank you again for visiting [facility], [place].  [facility]        Tips to Control Acid Reflux  To control acid reflux, you’ll need to make some basic diet and lifestyle changes. The simple steps outlined below may be all you’ll need to relieve discomfort.  Watch What You Eat    · Avoid fatty foods and spicy foods.  · Eat fewer acidic foods, such as citrus and tomato-based foods. These can increase symptoms.  · Limit drinking alcohol, caffeine, and fizzy beverages. All increase acid reflux.  · Try limiting chocolate, peppermint, and spearmint. These can worsen acid reflux in some people.  Watch When You Eat  · Avoid lying down for 3 hours after eating.  · Do not snack before going to bed.  Raise Your Head    Raising your head and upper body by 4 inches to 6 inches helps limit reflux when you’re lying down. Put blocks under the head of the bed frame to raise it.  Other Changes  · Lose weight, if you need to.  · Don’t work out near bedtime.  · Avoid tight-fitting clothes.  · Limit aspirin and ibuprofen.  · Stop smoking.   © 2138-9955 [employer]. [address] [place] [address], [place] [address]. All rights reserved. This information is not intended as a substitute for professional medical care. Always follow your healthcare  professional's instructions.          Uncertain Causes of Abdominal Pain (Male)  Based on your visit today, the exact cause of your abdominal pain is not clear. Your exam and tests do not suggest a dangerous cause at this time. However, the signs of a serious problem may take more time to appear. Although your evaluation was reassuring today, sometimes early in the course of many conditions, exam and lab tests can appear normal. Therefore, it is important for you to watch for any new symptoms or worsening of your condition.  It may not be obvious what caused your symptoms. Pay attention to things that do seem to make your symptoms worse or better and discuss this with your doctor when you follow up.  The evaluation of abdominal pain in the emergency department may only require an exam by the doctor or it may include blood, urine or imaging studies, depending on many factors. Sometimes exams and tests can identify a cause but in many cases, a clear cause is not found. Further testing at follow up visits may help to suggest a clear diagnosis.  Home care  · Rest as much as you can until your next exam.  · Try to avoid any medicines (unless otherwise directed by your doctor), foods, activities, or other factors that may have contributed to your symptoms.  · Try to eat foods that you know that you have tolerated well in the past. Certain diets may be recommended for some conditions that cause abdominal pain. However, since the cause of your symptoms may not be clear, discuss your diet more with your healthcare provider or specialist for further recommendations.   · If you have diarrhea, it may help to avoid dairy (lactose) for the time being. A low fat, low fiber diet can also help.  · Eating several small meals per day as opposed to 2 or 3 larger meals may help.  · Avoid dehydration. Make sure to drink plenty of water. Other options include broth, soup, gelatin, sports drinks, or other clear liquids.  · Watch closely  for anything that may make your symptoms worse or better. Pay close attention to symptoms below that may mean your condition is getting worse.  Follow-up care  Follow up with your healthcare provider if your symptoms are not improving, or as advised. In some cases, you may need more testing.  When to seek medical advice  Call your healthcare provider right away if any of these occur:  · Pain is becoming worse  · You are unable to take your medicines or can't keep water down due to excessive vomiting  · Swelling of the abdomen  · Fever of 100.4ºF (38ºC) or higher, or as directed by your healthcare provider  · Blood in vomit or bowel movements (dark red or black color)  · Jaundice (yellow color of eyes and skin)  · New onset of weakness, dizziness or fainting  · New onset of chest, arm, back, neck or jaw pain  © 5612-6859 [employer]. [address] [place] [address]. All rights reserved. This information is not intended as a substitute for professional medical care. Always follow your healthcare professional's instructions.

## 2024-12-05 ENCOUNTER — OFFICE VISIT (OUTPATIENT)
Dept: OPTOMETRY | Facility: CLINIC | Age: 54
End: 2024-12-05
Attending: INTERNAL MEDICINE
Payer: COMMERCIAL

## 2024-12-05 DIAGNOSIS — H52.13 HIGH MYOPIA, BILATERAL: Primary | ICD-10-CM

## 2024-12-05 DIAGNOSIS — H52.203 ASTIGMATISM OF BOTH EYES, UNSPECIFIED TYPE: ICD-10-CM

## 2024-12-05 DIAGNOSIS — H52.4 PRESBYOPIA: ICD-10-CM

## 2024-12-05 DIAGNOSIS — E11.9 TYPE 2 DIABETES MELLITUS WITHOUT COMPLICATION, WITHOUT LONG-TERM CURRENT USE OF INSULIN (H): ICD-10-CM

## 2024-12-05 DIAGNOSIS — H31.103: ICD-10-CM

## 2024-12-05 ASSESSMENT — REFRACTION_MANIFEST
OS_AXIS: 110
OS_CYLINDER: +1.25
OD_SPHERE: -8.50
OS_SPHERE: -7.25
OD_SPHERE: -7.75
OS_SPHERE: -7.00
OD_CYLINDER: +0.25
OD_AXIS: 097
OS_AXIS: 120
OD_ADD: +2.50
OD_CYLINDER: +1.50
OS_CYLINDER: +1.00
METHOD_AUTOREFRACTION: 1
OS_ADD: +2.50
OD_AXIS: 081

## 2024-12-05 ASSESSMENT — KERATOMETRY
OS_AXISANGLE_DEGREES: 85
OD_AXISANGLE2_DEGREES: 154
OD_K2POWER_DIOPTERS: 45.87
OD_AXISANGLE_DEGREES: 64
OS_AXISANGLE2_DEGREES: 175
OD_K1POWER_DIOPTERS: 44.12
OS_K2POWER_DIOPTERS: 45.37
OS_K1POWER_DIOPTERS: 44.62

## 2024-12-05 ASSESSMENT — SLIT LAMP EXAM - LIDS
COMMENTS: LOWER LID HORDEOLUM
COMMENTS: NORMAL

## 2024-12-05 ASSESSMENT — REFRACTION_WEARINGRX
OS_SPHERE: -7.25
OD_ADD: +2.25
OD_SPHERE: -8.50
OS_CYLINDER: +1.25
OS_AXIS: 145
OS_ADD: +2.25
SPECS_TYPE: PAL
OD_CYLINDER: +1.25
OD_AXIS: 079

## 2024-12-05 ASSESSMENT — VISUAL ACUITY
OD_CC: 20/20
OD_CC: 20/20
OS_CC: 20/30
CORRECTION_TYPE: GLASSES
METHOD: SNELLEN - LINEAR
OD_CC+: -1
OS_CC: 20/20

## 2024-12-05 ASSESSMENT — TONOMETRY
OS_IOP_MMHG: 17
IOP_METHOD: APPLANATION
OD_IOP_MMHG: 17

## 2024-12-05 ASSESSMENT — CONF VISUAL FIELD
OD_INFERIOR_NASAL_RESTRICTION: 0
OD_SUPERIOR_NASAL_RESTRICTION: 0
OS_SUPERIOR_TEMPORAL_RESTRICTION: 0
OD_SUPERIOR_TEMPORAL_RESTRICTION: 0
OS_NORMAL: 1
OD_INFERIOR_TEMPORAL_RESTRICTION: 0
OS_INFERIOR_TEMPORAL_RESTRICTION: 0
OS_INFERIOR_NASAL_RESTRICTION: 0
METHOD: COUNTING FINGERS
OD_NORMAL: 1
OS_SUPERIOR_NASAL_RESTRICTION: 0

## 2024-12-05 ASSESSMENT — CUP TO DISC RATIO
OD_RATIO: 0.1
OS_RATIO: 0.1

## 2024-12-05 ASSESSMENT — EXTERNAL EXAM - LEFT EYE: OS_EXAM: NORMAL

## 2024-12-05 ASSESSMENT — EXTERNAL EXAM - RIGHT EYE: OD_EXAM: NORMAL

## 2024-12-05 NOTE — LETTER
12/5/2024      Montez Gaviriamichelle  [address] [place] [address]      Dear Colleague,    Thank you for referring your patient, Montez Valerio, to the [facility]. Please see a copy of my visit note below.    Chief Complaint   Patient presents with     Diabetic Eye Exam       Lab Results   Component Value Date    A1C 7.1 09/30/2024    A1C 7.8 10/04/2023    A1C 6.8 12/21/2022    A1C 9.1 05/17/2022    A1C 8.2 09/22/2021    A1C 8.0 03/23/2021    A1C 7.4 10/15/2020    A1C 8.6 01/28/2020    A1C 7.1 02/01/2019    A1C 8.5 07/17/2018            Last Eye Exam: 2 years ago   Dilated Previously: Yes, side effects of dilation explained today    What are you currently using to see?  Glasses and contacts - does not want a contact lens eval today     Distance Vision Acuity: Satisfied with vision    Near Vision Acuity: Not satisfied in glasses     Eye Comfort: dry  Do you use eye drops? : Yes: Systane when needed       Mayela Madsen - Optometric Assistant      Medical, surgical and family histories reviewed and updated 12/5/2024.       OBJECTIVE: See Ophthalmology exam    ASSESSMENT:    ICD-10-CM    1. High myopia, bilateral  H52.13 REFRACTION      2. Type 2 diabetes mellitus without complication, without long-term current use of insulin (H)  E11.9 OPTOMETRY REFERRAL     EYE EXAM (SIMPLE-NONBILLABLE)      3. Astigmatism of both eyes, unspecified type  H52.203 REFRACTION      4. Presbyopia  H52.4 REFRACTION      5. Chorioretinal degeneration, bilateral  H31.103           PLAN:  No change needed  Ongoing glucose control to prevent retinopathy   Yearly visits unless new floaters then within a day    Montez Valerio aware  eye exam results will be sent to Edna Younger OD        Again, thank you for allowing me to participate in the care of your patient.        Sincerely,        Aida Espana, OD

## 2024-12-05 NOTE — PATIENT INSTRUCTIONS
Patient Education   Diabetes weakens the blood vessels all over the body, including the eyes. Damage to the blood vessels in the eyes can cause swelling or bleeding into part of the eye (called the retina). This is called diabetic retinopathy (ELIZABETH-tin-AH-puh-thee). If not treated, this disease can cause vision loss or blindness.   Symptoms may include blurred or distorted vision, but many people have no symptoms. It's important to see your eye doctor regularly to check for problems.   Early treatment and good control can help protect your vision. Here are the things you can do to help prevent vision loss:      1. Keep your blood sugar levels under tight control.      2. Bring high blood pressure under control.      3. No smoking.      4. Have yearly dilated eye exams.  No retinopathy   In order to treat a stye, use warm compresses 4x daily for 5-10 minutes until it drains.   It is also recommended you also use a lid cleanser daily with an antibacterial product such as Ocusoft, or Sterilid cleanser at bedtime, which will help decrease the bacterial jigna on your eye lids that cause styes.      Directions for warm soaks  There are few methods for hot compresses. Moisten a washcloth with hot water, or microwave for 10 seconds, being careful to not get the cloth too hot.   Then put the washcloth onto your eyelids for 5 minutes. It will cool quickly so a rice pack or eyemask that can be heated and laid on top of the washcloth will help retain the heat.     The signs of a retinal detachment are flashes of light or a curtain covering the vision.  If you should notice any of these changes let me know right away.  If I am not available you should be seen immediately for an eye evaluation.   .

## 2024-12-05 NOTE — PROGRESS NOTES
Chief Complaint   Patient presents with    Diabetic Eye Exam       Lab Results   Component Value Date    A1C 7.1 09/30/2024    A1C 7.8 10/04/2023    A1C 6.8 12/21/2022    A1C 9.1 05/17/2022    A1C 8.2 09/22/2021    A1C 8.0 03/23/2021    A1C 7.4 10/15/2020    A1C 8.6 01/28/2020    A1C 7.1 02/01/2019    A1C 8.5 07/17/2018            Last Eye Exam: 2 years ago   Dilated Previously: Yes, side effects of dilation explained today    What are you currently using to see?  Glasses and contacts - does not want a contact lens eval today     Distance Vision Acuity: Satisfied with vision    Near Vision Acuity: Not satisfied in glasses     Eye Comfort: dry  Do you use eye drops? : Yes: Systane when needed       Mayela Madsen - Optometric Assistant      Medical, surgical and family histories reviewed and updated 12/5/2024.       OBJECTIVE: See Ophthalmology exam    ASSESSMENT:    ICD-10-CM    1. High myopia, bilateral  H52.13 REFRACTION      2. Type 2 diabetes mellitus without complication, without long-term current use of insulin (H)  E11.9 OPTOMETRY REFERRAL     EYE EXAM (SIMPLE-NONBILLABLE)      3. Astigmatism of both eyes, unspecified type  H52.203 REFRACTION      4. Presbyopia  H52.4 REFRACTION      5. Chorioretinal degeneration, bilateral  H31.103           PLAN:  No change needed  Ongoing glucose control to prevent retinopathy   Yearly visits unless new floaters then within a day    Montez Valerio aware  eye exam results will be sent to Edna Younger OD

## 2025-03-24 ENCOUNTER — OFFICE VISIT (OUTPATIENT)
Dept: INTERNAL MEDICINE | Facility: CLINIC | Age: 55
End: 2025-03-24
Payer: COMMERCIAL

## 2025-03-24 VITALS
OXYGEN SATURATION: 96 % | BODY MASS INDEX: 41.48 KG/M2 | HEART RATE: 100 BPM | RESPIRATION RATE: 14 BRPM | WEIGHT: 264.3 LBS | DIASTOLIC BLOOD PRESSURE: 82 MMHG | HEIGHT: 67 IN | SYSTOLIC BLOOD PRESSURE: 138 MMHG | TEMPERATURE: 98.9 F

## 2025-03-24 DIAGNOSIS — E11.9 TYPE 2 DIABETES MELLITUS WITHOUT COMPLICATION, WITHOUT LONG-TERM CURRENT USE OF INSULIN (H): ICD-10-CM

## 2025-03-24 DIAGNOSIS — I10 ESSENTIAL HYPERTENSION, BENIGN: Primary | ICD-10-CM

## 2025-03-24 DIAGNOSIS — E66.01 MORBID OBESITY (H): ICD-10-CM

## 2025-03-24 DIAGNOSIS — E78.2 MIXED HYPERLIPIDEMIA: ICD-10-CM

## 2025-03-24 LAB
EST. AVERAGE GLUCOSE BLD GHB EST-MCNC: 169 MG/DL
HBA1C MFR BLD: 7.5 % (ref 0–5.6)

## 2025-03-24 PROCEDURE — G2211 COMPLEX E/M VISIT ADD ON: HCPCS | Performed by: INTERNAL MEDICINE

## 2025-03-24 PROCEDURE — 83036 HEMOGLOBIN GLYCOSYLATED A1C: CPT | Performed by: INTERNAL MEDICINE

## 2025-03-24 PROCEDURE — 36415 COLL VENOUS BLD VENIPUNCTURE: CPT | Performed by: INTERNAL MEDICINE

## 2025-03-24 PROCEDURE — 99214 OFFICE O/P EST MOD 30 MIN: CPT | Performed by: INTERNAL MEDICINE

## 2025-03-24 PROCEDURE — 3075F SYST BP GE 130 - 139MM HG: CPT | Performed by: INTERNAL MEDICINE

## 2025-03-24 PROCEDURE — 80061 LIPID PANEL: CPT | Performed by: INTERNAL MEDICINE

## 2025-03-24 PROCEDURE — 3079F DIAST BP 80-89 MM HG: CPT | Performed by: INTERNAL MEDICINE

## 2025-03-24 PROCEDURE — 80053 COMPREHEN METABOLIC PANEL: CPT | Performed by: INTERNAL MEDICINE

## 2025-03-24 RX ORDER — HYDROCHLOROTHIAZIDE 25 MG/1
25 TABLET ORAL DAILY
Qty: 90 TABLET | Refills: 1 | Status: SHIPPED | OUTPATIENT
Start: 2025-03-24

## 2025-03-24 RX ORDER — LOSARTAN POTASSIUM 50 MG/1
50 TABLET ORAL DAILY
Qty: 90 TABLET | Refills: 1 | Status: SHIPPED | OUTPATIENT
Start: 2025-03-24

## 2025-03-24 NOTE — NURSING NOTE
"/84   Pulse 100   Temp 98.9  F (37.2  C) (Tympanic)   Resp 14   Ht 1.702 m (5' 7\")   Wt 119.9 kg (264 lb 4.8 oz)   SpO2 96%   BMI 41.40 kg/m      "

## 2025-03-24 NOTE — PROGRESS NOTES
"  Assessment & Plan     (I10) Essential hypertension, benign    Plan: refilled  losartan (COZAAR) 50 MG tablet, hydrochlorothiazide (HYDRODIURIL) 25 MG tablet as directed.explained clearly about the medication,insructions and side effects.   Advised to follow low salt diet and exercise and f/u in 6 mths.             (E11.9) Type 2 diabetes mellitus without complication, without long-term current use of insulin (H)  Plan: high BS and A1c increased since before. Started on glipiZIDE (GLUCOTROL XL) 5 MG 24 hr tablet daily as directed.explained clearly about the medication,insructions and side effects. Continue  metFORMIN (GLUCOPHAGE XR) 500 MG 24 hr tablet as directed refilled.explained clearly about the medication,insructions and side effects.HEMOGLOBIN A1C, Comprehensive metabolic panel,                   (E78.2) Mixed hyperlipidemia  Plan: pt skipping Crestor 2-3 x week.  Refilled  rosuvastatin (CRESTOR) 10 MG tablet daily and advised to take as directed.explained clearly about the medication,insructions and side effects.Lipid panel reflex to direct LDL Fasting            (E66.01) Morbid obesity (H)  Plan:  Discussed in detail about Diet,calorie intake,and importance of regular exercise         BMI  Estimated body mass index is 41.4 kg/m  as calculated from the following:    Height as of this encounter: 1.702 m (5' 7\").    Weight as of this encounter: 119.9 kg (264 lb 4.8 oz).   Weight management plan: Discussed healthy diet and exercise guidelines      The longitudinal plan of care for the diagnosis(es)/condition(s) as documented were addressed during this visit. Due to the added complexity in care, I will continue to support Montez in the subsequent management and with ongoing continuity of care.    Subjective   Montez is a 55 year old, presenting for the following health issues:  Lipids, Hypertension, and Diabetes      3/24/2025     2:22 PM   Additional Questions   Roomed by ,mamta   Accompanied by self         " 3/24/2025     2:22 PM   Patient Reported Additional Medications   Patient reports taking the following new medications none     History of Present Illness       Diabetes:   He presents for follow up of diabetes.  He is checking home blood glucose two times daily.   He checks blood glucose before meals.  Blood glucose is sometimes over 200 and never under 70. He is aware of hypoglycemia symptoms including lethargy.   He is concerned about blood sugar frequently over 200.   He is having numbness in feet and burning in feet.            Hypertension: He presents for follow up of hypertension.  He does not check blood pressure  regularly outside of the clinic. Outside blood pressures have been over 140/90. He does not follow a low salt diet. He consumes 0 sweetened beverage(s) daily.He exercises with enough effort to increase his heart rate 10 to 19 minutes per day.  He exercises with enough effort to increase his heart rate 3 or less days per week.   He is taking medications regularly.        Diabetes Follow-up    How often are you checking your blood sugar? Two times daily  Blood sugar testing frequency justification:   BS over 200  What time of day are you checking your blood sugars (select all that apply)?  Before meals  Have you had any blood sugars above 200?  Yes sometimes   Have you had any blood sugars below 70?  No  What symptoms do you notice when your blood sugar is low?  Lethargy  What concerns do you have today about your diabetes? Blood sugar is often over 200   Do you have any of these symptoms? (Select all that apply)  Numbness in feet    BP Readings from Last 2 Encounters:   03/24/25 139/84   09/30/24 124/82     Hemoglobin A1C (%)   Date Value   09/30/2024 7.1 (H)   10/04/2023 7.8 (H)   03/23/2021 8.0 (H)   10/15/2020 7.4 (H)     LDL Cholesterol Calculated (mg/dL)   Date Value   09/30/2024 116 (H)   10/04/2023 69   10/15/2020 87   01/28/2020 117 (H)        Hypertension Follow-up    Do you check your  "blood pressure regularly outside of the clinic? No   Are you following a low salt diet? Yes  Are your blood pressures ever more than 140 on the top number (systolic) OR more   than 90 on the bottom number (diastolic), for example 140/90? No    Hyperlipidemia Follow-Up    Are you regularly taking any medication or supplement to lower your cholesterol?   Yes- ROSUVASTATIN  misses 2-3 x week   Are you having muscle aches or other side effects that you think could be caused by your cholesterol lowering medication?  No      Past Medical History:   Diagnosis Date    Arthritis     Neck    Diabetes mellitus (H)     type 2    Dyspnea on exertion     Gastro-oesophageal reflux disease     Hypertension     Other chronic pain     neck pain    PONV (postoperative nausea and vomiting)     Sleep apnea     CPAP    SOB (shortness of breath) 4/26/2017       Current Outpatient Medications   Medication Sig Dispense Refill    cholecalciferol (VITAMIN  -D) 1000 UNITS capsule Take 1 capsule by mouth daily      hydrochlorothiazide (HYDRODIURIL) 25 MG tablet Take 1 tablet (25 mg) by mouth daily. 90 tablet 1    losartan (COZAAR) 50 MG tablet Take 1 tablet (50 mg) by mouth daily. 90 tablet 1    metFORMIN (GLUCOPHAGE XR) 500 MG 24 hr tablet Take 2 tablets (1,000 mg) by mouth 2 times daily (with meals). 360 tablet 1    rosuvastatin (CRESTOR) 10 MG tablet Take 1 tablet (10 mg) by mouth daily. 90 tablet 1           Review of Systems  CONSTITUTIONAL: NEGATIVE for fever, chills, change in weight  EYES: NEGATIVE for vision changes or irritation  RESP: NEGATIVE for significant cough or SOB  CV: NEGATIVE for chest pain, palpitations or peripheral edema  MUSCULOSKELETAL: NEGATIVE for significant arthralgias or myalgia  ENDOCRINE: NEGATIVE for temperature intolerance, skin/hair changes          Objective    /84   Pulse 100   Temp 98.9  F (37.2  C) (Tympanic)   Resp 14   Ht 1.702 m (5' 7\")   Wt 119.9 kg (264 lb 4.8 oz)   SpO2 96%   BMI 41.40 " kg/m    Body mass index is 41.4 kg/m .  Physical Exam   GENERAL: alert and no distress  EYES: Eyes grossly normal to inspection, PERRL and conjunctivae and sclerae normal  RESP: lungs clear to auscultation - no rales, rhonchi or wheezes  CV: regular rate and rhythm, normal S1 S2, no S3 or S4, no murmur, click or rub, no peripheral edema  MS: no gross musculoskeletal defects noted, no edema  NEURO: Normal strength and tone, mentation intact and speech normal  Diabetic foot exam: normal DP and PT pulses, no trophic changes or ulcerative lesions, normal sensory exam, and normal monofilament exam    Results for orders placed or performed in visit on 03/24/25   HEMOGLOBIN A1C     Status: Abnormal   Result Value Ref Range    Estimated Average Glucose 169 (H) <117 mg/dL    Hemoglobin A1C 7.5 (H) 0.0 - 5.6 %   Comprehensive metabolic panel     Status: Abnormal   Result Value Ref Range    Sodium 139 135 - 145 mmol/L    Potassium 4.2 3.4 - 5.3 mmol/L    Carbon Dioxide (CO2) 26 22 - 29 mmol/L    Anion Gap 13 7 - 15 mmol/L    Urea Nitrogen 5.8 (L) 6.0 - 20.0 mg/dL    Creatinine 0.79 0.67 - 1.17 mg/dL    GFR Estimate >90 >60 mL/min/1.73m2    Calcium 9.9 8.8 - 10.4 mg/dL    Chloride 100 98 - 107 mmol/L    Glucose 166 (H) 70 - 99 mg/dL    Alkaline Phosphatase 61 40 - 150 U/L    AST 18 0 - 45 U/L    ALT 17 0 - 70 U/L    Protein Total 7.3 6.4 - 8.3 g/dL    Albumin 4.6 3.5 - 5.2 g/dL    Bilirubin Total 0.4 <=1.2 mg/dL    Patient Fasting > 8hrs? No    Lipid panel reflex to direct LDL Fasting     Status: Abnormal   Result Value Ref Range    Cholesterol 188 <200 mg/dL    Triglycerides 76 <150 mg/dL    Direct Measure HDL 61 >=40 mg/dL    LDL Cholesterol Calculated 112 (H) <100 mg/dL    Non HDL Cholesterol 127 <130 mg/dL    Patient Fasting > 8hrs? No     Narrative    Cholesterol  Desirable: < 200 mg/dL  Borderline High: 200 - 239 mg/dL  High: >= 240 mg/dL    Triglycerides  Normal: < 150 mg/dL  Borderline High: 150 - 199 mg/dL  High:  200-499 mg/dL  Very High: >= 500 mg/dL    Direct Measure HDL  Female: >= 50 mg/dL   Male: >= 40 mg/dL    LDL Cholesterol  Desirable: < 100 mg/dL  Above Desirable: 100 - 129 mg/dL   Borderline High: 130 - 159 mg/dL   High:  160 - 189 mg/dL   Very High: >= 190 mg/dL    Non HDL Cholesterol  Desirable: < 130 mg/dL  Above Desirable: 130 - 159 mg/dL  Borderline High: 160 - 189 mg/dL  High: 190 - 219 mg/dL  Very High: >= 220 mg/dL           Signed Electronically by: Edna Younger MD

## 2025-03-25 LAB
ALBUMIN SERPL BCG-MCNC: 4.6 G/DL (ref 3.5–5.2)
ALP SERPL-CCNC: 61 U/L (ref 40–150)
ALT SERPL W P-5'-P-CCNC: 17 U/L (ref 0–70)
ANION GAP SERPL CALCULATED.3IONS-SCNC: 13 MMOL/L (ref 7–15)
AST SERPL W P-5'-P-CCNC: 18 U/L (ref 0–45)
BILIRUB SERPL-MCNC: 0.4 MG/DL
BUN SERPL-MCNC: 5.8 MG/DL (ref 6–20)
CALCIUM SERPL-MCNC: 9.9 MG/DL (ref 8.8–10.4)
CHLORIDE SERPL-SCNC: 100 MMOL/L (ref 98–107)
CHOLEST SERPL-MCNC: 188 MG/DL
CREAT SERPL-MCNC: 0.79 MG/DL (ref 0.67–1.17)
EGFRCR SERPLBLD CKD-EPI 2021: >90 ML/MIN/1.73M2
FASTING STATUS PATIENT QL REPORTED: NO
FASTING STATUS PATIENT QL REPORTED: NO
GLUCOSE SERPL-MCNC: 166 MG/DL (ref 70–99)
HCO3 SERPL-SCNC: 26 MMOL/L (ref 22–29)
HDLC SERPL-MCNC: 61 MG/DL
LDLC SERPL CALC-MCNC: 112 MG/DL
NONHDLC SERPL-MCNC: 127 MG/DL
POTASSIUM SERPL-SCNC: 4.2 MMOL/L (ref 3.4–5.3)
PROT SERPL-MCNC: 7.3 G/DL (ref 6.4–8.3)
SODIUM SERPL-SCNC: 139 MMOL/L (ref 135–145)
TRIGL SERPL-MCNC: 76 MG/DL

## 2025-03-27 RX ORDER — METFORMIN HYDROCHLORIDE 500 MG/1
1000 TABLET, EXTENDED RELEASE ORAL 2 TIMES DAILY WITH MEALS
Qty: 360 TABLET | Refills: 1 | Status: SHIPPED | OUTPATIENT
Start: 2025-03-27

## 2025-03-27 RX ORDER — GLIPIZIDE 5 MG/1
5 TABLET, FILM COATED, EXTENDED RELEASE ORAL DAILY
Qty: 90 TABLET | Refills: 0 | Status: SHIPPED | OUTPATIENT
Start: 2025-03-27

## 2025-03-27 RX ORDER — ROSUVASTATIN CALCIUM 10 MG/1
10 TABLET, COATED ORAL DAILY
Qty: 90 TABLET | Refills: 1 | Status: SHIPPED | OUTPATIENT
Start: 2025-03-27

## 2025-06-14 DIAGNOSIS — E11.9 TYPE 2 DIABETES MELLITUS WITHOUT COMPLICATION, WITHOUT LONG-TERM CURRENT USE OF INSULIN (H): ICD-10-CM

## 2025-06-16 RX ORDER — GLIPIZIDE 5 MG/1
5 TABLET, FILM COATED, EXTENDED RELEASE ORAL DAILY
Qty: 90 TABLET | Refills: 0 | Status: SHIPPED | OUTPATIENT
Start: 2025-06-16

## 2025-09-01 ENCOUNTER — PATIENT OUTREACH (OUTPATIENT)
Dept: CARE COORDINATION | Facility: CLINIC | Age: 55
End: 2025-09-01
Payer: COMMERCIAL

## (undated) DEVICE — SU PDS II 0 CT-2 27" Z334H

## (undated) DEVICE — DRSG STERI STRIP 1/2X4" R1547

## (undated) DEVICE — BAG CLEAR TRASH 1.3M 39X33" P4040C

## (undated) DEVICE — SU DERMABOND MINI DHVM12

## (undated) DEVICE — LINEN FULL SHEET 5511

## (undated) DEVICE — LIGHT HANDLE X2

## (undated) DEVICE — BLADE CLIPPER 3M 9670

## (undated) DEVICE — PACK MINOR CUSTOM RIDGES SBA32RMRMA

## (undated) DEVICE — KIT ENDO TURNOVER/PROCEDURE W/CLEAN A SCOPE LINERS 103888

## (undated) DEVICE — Device

## (undated) DEVICE — SUCTION TIP YANKAUER W/O VENT K86

## (undated) DEVICE — SU VICRYL 3-0 SH 27" UND J416H

## (undated) DEVICE — ESU GROUND PAD ADULT W/CORD E7507

## (undated) DEVICE — SYR 30ML LL W/O NDL 302832

## (undated) DEVICE — GLOVE PROTEXIS POWDER FREE SMT 6.5  2D72PT65X

## (undated) DEVICE — SU VICRYL 4-0 PS-2 18" UND J496H

## (undated) DEVICE — PREP CHLORAPREP 26ML TINTED ORANGE  260815

## (undated) DEVICE — DRAPE LAP W/ARMBOARD 29410

## (undated) DEVICE — LINEN HALF SHEET 5512

## (undated) DEVICE — LINEN TOWEL PACK X10 5473

## (undated) DEVICE — GLOVE PROTEXIS BLUE W/NEU-THERA 7.0  2D73EB70

## (undated) DEVICE — MANIFOLD NEPTUNE 4 PORT 700-20

## (undated) DEVICE — NDL 22GA 1.5"

## (undated) DEVICE — BLADE KNIFE SURG 10 371110

## (undated) DEVICE — CLEANSER WOUND IRRISEPT 0.05% CHG IRRISEPT-403

## (undated) RX ORDER — GLYCOPYRROLATE 0.2 MG/ML
INJECTION INTRAMUSCULAR; INTRAVENOUS
Status: DISPENSED
Start: 2018-07-18

## (undated) RX ORDER — FENTANYL CITRATE 0.05 MG/ML
INJECTION, SOLUTION INTRAMUSCULAR; INTRAVENOUS
Status: DISPENSED
Start: 2022-07-15

## (undated) RX ORDER — PROPOFOL 10 MG/ML
INJECTION, EMULSION INTRAVENOUS
Status: DISPENSED
Start: 2018-07-18

## (undated) RX ORDER — NEOSTIGMINE METHYLSULFATE 1 MG/ML
VIAL (ML) INJECTION
Status: DISPENSED
Start: 2018-07-18

## (undated) RX ORDER — OXYCODONE HYDROCHLORIDE 5 MG/1
TABLET ORAL
Status: DISPENSED
Start: 2018-07-18

## (undated) RX ORDER — FENTANYL CITRATE 50 UG/ML
INJECTION, SOLUTION INTRAMUSCULAR; INTRAVENOUS
Status: DISPENSED
Start: 2018-07-18

## (undated) RX ORDER — OXYCODONE AND ACETAMINOPHEN 5; 325 MG/1; MG/1
TABLET ORAL
Status: DISPENSED
Start: 2018-07-18

## (undated) RX ORDER — CEFAZOLIN SODIUM 1 G/50ML
SOLUTION INTRAVENOUS
Status: DISPENSED
Start: 2018-07-18

## (undated) RX ORDER — LIDOCAINE HYDROCHLORIDE 10 MG/ML
INJECTION, SOLUTION EPIDURAL; INFILTRATION; INTRACAUDAL; PERINEURAL
Status: DISPENSED
Start: 2018-07-18

## (undated) RX ORDER — BUPIVACAINE HYDROCHLORIDE 2.5 MG/ML
INJECTION, SOLUTION EPIDURAL; INFILTRATION; INTRACAUDAL
Status: DISPENSED
Start: 2018-07-18